# Patient Record
Sex: MALE | Race: WHITE | NOT HISPANIC OR LATINO | Employment: FULL TIME | URBAN - METROPOLITAN AREA
[De-identification: names, ages, dates, MRNs, and addresses within clinical notes are randomized per-mention and may not be internally consistent; named-entity substitution may affect disease eponyms.]

---

## 2021-02-26 ENCOUNTER — IMMUNIZATION (OUTPATIENT)
Dept: FAMILY MEDICINE | Facility: CLINIC | Age: 66
End: 2021-02-26
Payer: COMMERCIAL

## 2021-02-26 DIAGNOSIS — Z23 NEED FOR VACCINATION: Primary | ICD-10-CM

## 2021-02-26 PROCEDURE — 91300 COVID-19, MRNA, LNP-S, PF, 30 MCG/0.3 ML DOSE VACCINE: CPT | Mod: PBBFAC | Performed by: FAMILY MEDICINE

## 2021-03-19 ENCOUNTER — IMMUNIZATION (OUTPATIENT)
Dept: FAMILY MEDICINE | Facility: CLINIC | Age: 66
End: 2021-03-19
Payer: COMMERCIAL

## 2021-03-19 DIAGNOSIS — Z23 NEED FOR VACCINATION: Primary | ICD-10-CM

## 2021-03-19 PROCEDURE — 0002A COVID-19, MRNA, LNP-S, PF, 30 MCG/0.3 ML DOSE VACCINE: CPT | Mod: PBBFAC | Performed by: FAMILY MEDICINE

## 2021-03-19 PROCEDURE — 91300 COVID-19, MRNA, LNP-S, PF, 30 MCG/0.3 ML DOSE VACCINE: CPT | Mod: PBBFAC | Performed by: FAMILY MEDICINE

## 2022-01-04 ENCOUNTER — IMMUNIZATION (OUTPATIENT)
Dept: FAMILY MEDICINE | Facility: CLINIC | Age: 67
End: 2022-01-04
Payer: MEDICARE

## 2022-01-04 DIAGNOSIS — Z23 NEED FOR VACCINATION: Primary | ICD-10-CM

## 2022-01-04 PROCEDURE — 0004A COVID-19, MRNA, LNP-S, PF, 30 MCG/0.3 ML DOSE VACCINE: CPT | Mod: PBBFAC | Performed by: FAMILY MEDICINE

## 2022-07-08 ENCOUNTER — NURSE TRIAGE (OUTPATIENT)
Dept: ADMINISTRATIVE | Facility: CLINIC | Age: 67
End: 2022-07-08
Payer: MEDICARE

## 2022-07-08 NOTE — TELEPHONE ENCOUNTER
Patient's wife states c/o fever, 100.4, and diagnosis of Covid-19. Wife states patient has talked with his PCP and it was not recommended that he receive the monoclonal antibodies or Plaxovid. Wife called to inquire what temperature reading to monitor for for transport to ED.    Care Advice given per Covid-19 - Diagnosed or Suspected (Adult) Guidelines. Wife states understanding of Care Advice and cites no additional concerns at this time.    Reason for Disposition   [1] Fever AND [2] within 14 days of COVID-19 Exposure   [1] COVID-19 diagnosed by positive lab test (e.g., PCR, rapid self-test kit) AND [2] mild symptoms (e.g., cough, fever, others) AND [3] no complications or SOB    Additional Information   Negative: Shock suspected (e.g., cold/pale/clammy skin, too weak to stand, low BP, rapid pulse)   Negative: Difficult to awaken or acting confused (e.g., disoriented, slurred speech)   Negative: [1] Difficulty breathing AND [2] bluish lips, tongue or face   Negative: New-onset rash with multiple purple (or blood-colored) spots or dots   Negative: Sounds like a life-threatening emergency to the triager   Negative: Fever in a cancer patient who is currently (or recently) receiving chemotherapy or radiation therapy, or cancer patient who has metastatic or end-stage cancer and is receiving palliative care   Negative: Pregnant   Negative: Postpartum (from 0 to 6 weeks after delivery)   Negative: Fever onset within 24 hours of receiving vaccine   Negative: SEVERE difficulty breathing (e.g., struggling for each breath, speaks in single words)   Negative: Difficult to awaken or acting confused (e.g., disoriented, slurred speech)   Negative: Bluish (or gray) lips or face now   Negative: Shock suspected (e.g., cold/pale/clammy skin, too weak to stand, low BP, rapid pulse)   Negative: Sounds like a life-threatening emergency to the triager   Negative: [1] Diagnosed or suspected COVID-19 AND [2] symptoms  lasting 3 or more weeks   Negative: [1] COVID-19 exposure AND [2] NO symptoms   Negative: COVID-19 vaccine reaction suspected (e.g., fever, headache, muscle aches) occurring 1 to 3 days after getting vaccine   Negative: COVID-19 vaccine, questions about   Negative: [1] Lives with someone known to have influenza (flu test positive) AND [2] flu-like symptoms (e.g., cough, runny nose, sore throat, SOB; with or without fever)   Negative: [1] Adult with possible COVID-19 symptoms AND [2] triager concerned about severity of symptoms or other causes   Negative: COVID-19 and breastfeeding, questions about   Negative: SEVERE or constant chest pain or pressure  (Exception: Mild central chest pain, present only when coughing.)   Negative: MODERATE difficulty breathing (e.g., speaks in phrases, SOB even at rest, pulse 100-120)   Negative: [1] Headache AND [2] stiff neck (can't touch chin to chest)   Negative: Oxygen level (e.g., pulse oximetry) 90 percent or lower   Negative: Chest pain or pressure   Negative: Patient sounds very sick or weak to the triager   Negative: MILD difficulty breathing (e.g., minimal/no SOB at rest, SOB with walking, pulse <100)   Negative: Fever > 103 F (39.4 C)   Negative: [1] Fever > 101 F (38.3 C) AND [2] age > 60 years   Negative: [1] Fever > 100.0 F (37.8 C) AND [2] bedridden (e.g., nursing home patient, CVA, chronic illness, recovering from surgery)   Negative: HIGH RISK for severe COVID complications (e.g., weak immune system, age > 64 years, obesity with BMI > 25, pregnant, chronic lung disease or other chronic medical condition)  (Exception: Already seen by PCP and no new or worsening symptoms.)   Negative: [1] HIGH RISK patient AND [2] influenza is widespread in the community AND [3] ONE OR MORE respiratory symptoms: cough, sore throat, runny or stuffy nose   Negative: [1] HIGH RISK patient AND [2] influenza exposure within the last 7 days AND [3] ONE OR MORE respiratory  symptoms: cough, sore throat, runny or stuffy nose   Negative: Oxygen level (e.g., pulse oximetry) 91 to 94 percent   Negative: Fever present > 3 days (72 hours)   Negative: [1] Fever returns after gone for over 24 hours AND [2] symptoms worse or not improved   Negative: [1] Continuous (nonstop) coughing interferes with work or school AND [2] no improvement using cough treatment per Care Advice   Negative: [1] COVID-19 infection suspected by caller or triager AND [2] mild symptoms (cough, fever, or others) AND [3] negative COVID-19 rapid test   Negative: Cough present > 3 weeks   Negative: [1] COVID-19 diagnosed by positive lab test (e.g., PCR, rapid self-test kit) AND [2] NO symptoms (e.g., cough, fever, others)    Protocols used: FEVER-A-AH, CORONAVIRUS (COVID-19) DIAGNOSED OR [id number]

## 2022-10-11 ENCOUNTER — IMMUNIZATION (OUTPATIENT)
Dept: FAMILY MEDICINE | Facility: CLINIC | Age: 67
End: 2022-10-11
Payer: MEDICARE

## 2022-10-11 DIAGNOSIS — Z23 NEED FOR VACCINATION: Primary | ICD-10-CM

## 2022-10-11 PROCEDURE — 91312 COVID-19, MRNA, LNP-S, BIVALENT BOOSTER, PF, 30 MCG/0.3 ML DOSE: ICD-10-PCS | Mod: S$GLB,,, | Performed by: FAMILY MEDICINE

## 2022-10-11 PROCEDURE — 91312 COVID-19, MRNA, LNP-S, BIVALENT BOOSTER, PF, 30 MCG/0.3 ML DOSE: CPT | Mod: S$GLB,,, | Performed by: FAMILY MEDICINE

## 2022-10-11 PROCEDURE — 0124A COVID-19, MRNA, LNP-S, BIVALENT BOOSTER, PF, 30 MCG/0.3 ML DOSE: CPT | Mod: PBBFAC | Performed by: FAMILY MEDICINE

## 2025-01-08 ENCOUNTER — TELEPHONE (OUTPATIENT)
Dept: TRANSPLANT | Facility: CLINIC | Age: 70
End: 2025-01-08
Payer: MEDICARE

## 2025-01-08 DIAGNOSIS — Z00.5 TRANSPLANT DONOR EVALUATION: Primary | ICD-10-CM

## 2025-01-15 ENCOUNTER — TELEPHONE (OUTPATIENT)
Dept: TRANSPLANT | Facility: CLINIC | Age: 70
End: 2025-01-15
Payer: MEDICARE

## 2025-01-15 DIAGNOSIS — Z00.5 TRANSPLANT DONOR EVALUATION: Primary | ICD-10-CM

## 2025-01-15 NOTE — TELEPHONE ENCOUNTER
Patient notified that the results of the crossmatch with his son show that they are compatible. Patient states he would like to proceed with the medical evaluation. Required testing was discussed including infectious disease and HIV testing. Opportunity provided for questions. Informed that he will be contacted to schedule appointments. All questions were answered and patient verbalized understanding.

## 2025-01-24 ENCOUNTER — TELEPHONE (OUTPATIENT)
Dept: TRANSPLANT | Facility: CLINIC | Age: 70
End: 2025-01-24
Payer: MEDICARE

## 2025-01-24 NOTE — TELEPHONE ENCOUNTER
Spoke with donor, confirmed appointments for Wednesday 1/29/25. 24 hour urine collection reviewed.  All questions answered.

## 2025-01-29 ENCOUNTER — LAB VISIT (OUTPATIENT)
Dept: LAB | Facility: HOSPITAL | Age: 70
End: 2025-01-29
Payer: MEDICAID

## 2025-01-29 ENCOUNTER — HOSPITAL ENCOUNTER (OUTPATIENT)
Dept: RADIOLOGY | Facility: HOSPITAL | Age: 70
Discharge: HOME | End: 2025-01-29
Attending: NURSE PRACTITIONER
Payer: MEDICAID

## 2025-01-29 ENCOUNTER — OFFICE VISIT (OUTPATIENT)
Dept: TRANSPLANT | Facility: CLINIC | Age: 70
End: 2025-01-29
Payer: MEDICAID

## 2025-01-29 VITALS
DIASTOLIC BLOOD PRESSURE: 82 MMHG | SYSTOLIC BLOOD PRESSURE: 144 MMHG | HEIGHT: 64 IN | WEIGHT: 139.75 LBS | BODY MASS INDEX: 23.86 KG/M2 | OXYGEN SATURATION: 99 % | RESPIRATION RATE: 18 BRPM | HEART RATE: 58 BPM | TEMPERATURE: 97 F

## 2025-01-29 DIAGNOSIS — Z00.5 TRANSPLANT DONOR EVALUATION: ICD-10-CM

## 2025-01-29 DIAGNOSIS — G40.319 GENERALIZED CONVULSIVE EPILEPSY WITH INTRACTABLE EPILEPSY: ICD-10-CM

## 2025-01-29 DIAGNOSIS — Z00.5 WILLING TO BE KIDNEY DONOR: Primary | ICD-10-CM

## 2025-01-29 DIAGNOSIS — E78.2 MIXED HYPERLIPIDEMIA: Chronic | ICD-10-CM

## 2025-01-29 LAB
ABO + RH BLD: NORMAL
ALBUMIN SERPL BCP-MCNC: 4.7 G/DL (ref 3.5–5.2)
ALP SERPL-CCNC: 84 U/L (ref 40–150)
ALT SERPL W/O P-5'-P-CCNC: 28 U/L (ref 10–44)
ANION GAP SERPL CALC-SCNC: 11 MMOL/L (ref 8–16)
APTT PPP: 24.6 SEC (ref 21–32)
AST SERPL-CCNC: 29 U/L (ref 10–40)
BASOPHILS # BLD AUTO: 0.04 K/UL (ref 0–0.2)
BASOPHILS NFR BLD: 0.6 % (ref 0–1.9)
BILIRUB SERPL-MCNC: 0.5 MG/DL (ref 0.1–1)
BUN SERPL-MCNC: 14 MG/DL (ref 8–23)
CALCIUM SERPL-MCNC: 9.6 MG/DL (ref 8.7–10.5)
CHLORIDE SERPL-SCNC: 102 MMOL/L (ref 95–110)
CHOLEST SERPL-MCNC: 169 MG/DL (ref 120–199)
CHOLEST/HDLC SERPL: 2.4 {RATIO} (ref 2–5)
CO2 SERPL-SCNC: 29 MMOL/L (ref 23–29)
COMPLEXED PSA SERPL-MCNC: 0.45 NG/ML (ref 0–4)
CREAT SERPL-MCNC: 1.1 MG/DL (ref 0.5–1.4)
DIFFERENTIAL METHOD BLD: ABNORMAL
EOSINOPHIL # BLD AUTO: 0.4 K/UL (ref 0–0.5)
EOSINOPHIL NFR BLD: 6.3 % (ref 0–8)
ERYTHROCYTE [DISTWIDTH] IN BLOOD BY AUTOMATED COUNT: 15.5 % (ref 11.5–14.5)
EST. GFR  (NO RACE VARIABLE): >60 ML/MIN/1.73 M^2
ESTIMATED AVG GLUCOSE: 103 MG/DL (ref 68–131)
GLUCOSE SERPL-MCNC: 109 MG/DL
GLUCOSE SERPL-MCNC: 41 MG/DL
GLUCOSE SERPL-MCNC: 77 MG/DL (ref 70–110)
GLUCOSE SERPL-MCNC: 80 MG/DL (ref 70–110)
HBA1C MFR BLD: 5.2 % (ref 4–5.6)
HBV SURFACE AB SER-ACNC: <3 MIU/ML
HBV SURFACE AB SER-ACNC: NORMAL M[IU]/ML
HCT VFR BLD AUTO: 49.2 % (ref 40–54)
HDLC SERPL-MCNC: 70 MG/DL (ref 40–75)
HDLC SERPL: 41.4 % (ref 20–50)
HGB BLD-MCNC: 15.9 G/DL (ref 14–18)
IMM GRANULOCYTES # BLD AUTO: 0.02 K/UL (ref 0–0.04)
IMM GRANULOCYTES NFR BLD AUTO: 0.3 % (ref 0–0.5)
INR PPP: 1 (ref 0.8–1.2)
LDLC SERPL CALC-MCNC: 79 MG/DL (ref 63–159)
LYMPHOCYTES # BLD AUTO: 1.3 K/UL (ref 1–4.8)
LYMPHOCYTES NFR BLD: 19.4 % (ref 18–48)
MCH RBC QN AUTO: 30.3 PG (ref 27–31)
MCHC RBC AUTO-ENTMCNC: 32.3 G/DL (ref 32–36)
MCV RBC AUTO: 94 FL (ref 82–98)
MONOCYTES # BLD AUTO: 0.6 K/UL (ref 0.3–1)
MONOCYTES NFR BLD: 8.7 % (ref 4–15)
NEUTROPHILS # BLD AUTO: 4.3 K/UL (ref 1.8–7.7)
NEUTROPHILS NFR BLD: 64.7 % (ref 38–73)
NONHDLC SERPL-MCNC: 99 MG/DL
NRBC BLD-RTO: 0 /100 WBC
PHOSPHATE SERPL-MCNC: 2.2 MG/DL (ref 2.7–4.5)
PLATELET # BLD AUTO: 290 K/UL (ref 150–450)
PMV BLD AUTO: 10.6 FL (ref 9.2–12.9)
POTASSIUM SERPL-SCNC: 3.6 MMOL/L (ref 3.5–5.1)
PROT SERPL-MCNC: 8 G/DL (ref 6–8.4)
PROTHROMBIN TIME: 11.4 SEC (ref 9–12.5)
PTH-INTACT SERPL-MCNC: 76.1 PG/ML (ref 9–77)
RBC # BLD AUTO: 5.24 M/UL (ref 4.6–6.2)
SODIUM SERPL-SCNC: 142 MMOL/L (ref 136–145)
TRIGL SERPL-MCNC: 100 MG/DL (ref 30–150)
WBC # BLD AUTO: 6.56 K/UL (ref 3.9–12.7)

## 2025-01-29 PROCEDURE — 86644 CMV ANTIBODY: CPT | Mod: TXP | Performed by: NURSE PRACTITIONER

## 2025-01-29 PROCEDURE — 74174 CTA ABD&PLVS W/CONTRAST: CPT | Mod: TC,TXP

## 2025-01-29 PROCEDURE — 85610 PROTHROMBIN TIME: CPT | Mod: TXP | Performed by: NURSE PRACTITIONER

## 2025-01-29 PROCEDURE — 99214 OFFICE O/P EST MOD 30 MIN: CPT | Mod: PBBFAC,25,TXP

## 2025-01-29 PROCEDURE — 85025 COMPLETE CBC W/AUTO DIFF WBC: CPT | Mod: TXP | Performed by: NURSE PRACTITIONER

## 2025-01-29 PROCEDURE — 86665 EPSTEIN-BARR CAPSID VCA: CPT | Mod: TXP | Performed by: NURSE PRACTITIONER

## 2025-01-29 PROCEDURE — 86682 HELMINTH ANTIBODY: CPT | Mod: TXP | Performed by: NURSE PRACTITIONER

## 2025-01-29 PROCEDURE — 71046 X-RAY EXAM CHEST 2 VIEWS: CPT | Mod: TC,TXP

## 2025-01-29 PROCEDURE — 86706 HEP B SURFACE ANTIBODY: CPT | Mod: TXP | Performed by: NURSE PRACTITIONER

## 2025-01-29 PROCEDURE — 82951 GLUCOSE TOLERANCE TEST (GTT): CPT | Mod: TXP | Performed by: NURSE PRACTITIONER

## 2025-01-29 PROCEDURE — 71046 X-RAY EXAM CHEST 2 VIEWS: CPT | Mod: 26,TXP,, | Performed by: RADIOLOGY

## 2025-01-29 PROCEDURE — 99999 PR PBB SHADOW E&M-EST. PATIENT-LVL IV: CPT | Mod: PBBFAC,TXP,,

## 2025-01-29 PROCEDURE — 83970 ASSAY OF PARATHORMONE: CPT | Mod: TXP | Performed by: NURSE PRACTITIONER

## 2025-01-29 PROCEDURE — 80061 LIPID PANEL: CPT | Mod: TXP | Performed by: NURSE PRACTITIONER

## 2025-01-29 PROCEDURE — 74174 CTA ABD&PLVS W/CONTRAST: CPT | Mod: 26,TXP,, | Performed by: RADIOLOGY

## 2025-01-29 PROCEDURE — 86704 HEP B CORE ANTIBODY TOTAL: CPT | Mod: TXP | Performed by: NURSE PRACTITIONER

## 2025-01-29 PROCEDURE — 80053 COMPREHEN METABOLIC PANEL: CPT | Mod: TXP | Performed by: NURSE PRACTITIONER

## 2025-01-29 PROCEDURE — 86606 ASPERGILLUS ANTIBODY: CPT | Mod: TXP | Performed by: NURSE PRACTITIONER

## 2025-01-29 PROCEDURE — 86480 TB TEST CELL IMMUN MEASURE: CPT | Mod: TXP | Performed by: NURSE PRACTITIONER

## 2025-01-29 PROCEDURE — 83036 HEMOGLOBIN GLYCOSYLATED A1C: CPT | Mod: TXP | Performed by: NURSE PRACTITIONER

## 2025-01-29 PROCEDURE — 86703 HIV-1/HIV-2 1 RESULT ANTBDY: CPT | Mod: TXP | Performed by: NURSE PRACTITIONER

## 2025-01-29 PROCEDURE — 25500020 PHARM REV CODE 255: Mod: TXP | Performed by: NURSE PRACTITIONER

## 2025-01-29 PROCEDURE — 86901 BLOOD TYPING SEROLOGIC RH(D): CPT | Mod: TXP | Performed by: NURSE PRACTITIONER

## 2025-01-29 PROCEDURE — 86803 HEPATITIS C AB TEST: CPT | Mod: TXP | Performed by: NURSE PRACTITIONER

## 2025-01-29 PROCEDURE — 99214 OFFICE O/P EST MOD 30 MIN: CPT | Mod: S$PBB,TXP,, | Performed by: SURGERY

## 2025-01-29 PROCEDURE — 99205 OFFICE O/P NEW HI 60 MIN: CPT | Mod: S$PBB,TXP,, | Performed by: NURSE PRACTITIONER

## 2025-01-29 PROCEDURE — 85730 THROMBOPLASTIN TIME PARTIAL: CPT | Mod: TXP | Performed by: NURSE PRACTITIONER

## 2025-01-29 PROCEDURE — 82652 VIT D 1 25-DIHYDROXY: CPT | Mod: TXP | Performed by: NURSE PRACTITIONER

## 2025-01-29 PROCEDURE — 36415 COLL VENOUS BLD VENIPUNCTURE: CPT | Mod: TXP | Performed by: NURSE PRACTITIONER

## 2025-01-29 PROCEDURE — 84100 ASSAY OF PHOSPHORUS: CPT | Mod: TXP | Performed by: NURSE PRACTITIONER

## 2025-01-29 PROCEDURE — 86592 SYPHILIS TEST NON-TREP QUAL: CPT | Mod: TXP | Performed by: NURSE PRACTITIONER

## 2025-01-29 PROCEDURE — 87340 HEPATITIS B SURFACE AG IA: CPT | Mod: TXP | Performed by: NURSE PRACTITIONER

## 2025-01-29 PROCEDURE — 84153 ASSAY OF PSA TOTAL: CPT | Mod: TXP | Performed by: NURSE PRACTITIONER

## 2025-01-29 RX ORDER — PNV NO.95/FERROUS FUM/FOLIC AC 28MG-0.8MG
100 TABLET ORAL DAILY
COMMUNITY

## 2025-01-29 RX ADMIN — IOHEXOL 100 ML: 350 INJECTION, SOLUTION INTRAVENOUS at 02:01

## 2025-01-29 NOTE — PROGRESS NOTES
PHARM.D. PRE-TRANSPLANT DONOR NOTE:    This patient's medication therapy was evaluated as part of his pre-transplant evaluation.      The following general pharmacologic concerns were noted: none    The following concerns for post-operative pain management were noted: none          Current Outpatient Medications   Medication Sig Dispense Refill    cyanocobalamin (VITAMIN B-12) 100 MCG tablet Take 100 mcg by mouth once daily.      ascorbic acid (VITAMIN C) 1000 MG tablet Take 1,000 mg by mouth once daily.      lamotrigine (LAMICTAL) 200 MG tablet Take 1 tablet by mouth  daily 30 tablet 5    lorazepam (ATIVAN) 0.5 MG tablet Take 1 tablet (0.5 mg total) by mouth 2 (two) times daily. Take 1/2 tablet (0.25mg) in am, take 1 tablet (0.5mg) in pm (Patient taking differently: Take 0.5 mg by mouth 2 (two) times daily.) 5 tablet 0     No current facility-administered medications for this visit.           I am available for consultation and can be contacted, as needed by the other members of the Transplant team.

## 2025-01-29 NOTE — PROGRESS NOTES
Transplant Surgery Kidney Donor Evaluation     Referring Physician:      Subjective:     Chief Complaint: Ye Partida is a 69 y.o. year old male who presents today wishing to be evaluated as a potential living related donor for son.    History of Present Illness:  Ye reports being here without coercion, payment, guilt, or other alternative motives other than wanting to help someone with kidney disease.    External provider notes reviewed: No    Patient has had a previous pyloromyotomy as a child, required reoperation in 2007 for recurrent pyloric stenosis - gastric jejunostomy wa sperformed minimally invasively.    Review of Systems   Constitutional:  Negative for fatigue.   HENT:  Negative for drooling, postnasal drip and sore throat.    Eyes:  Negative for discharge and itching.   Respiratory:  Negative for choking and stridor.    Gastrointestinal:  Negative for rectal pain.   Endocrine: Negative for polydipsia.   Genitourinary:  Negative for enuresis and genital sores.   Musculoskeletal:  Negative for back pain, neck pain and neck stiffness.   Allergic/Immunologic: Negative for immunocompromised state.   Neurological:  Negative for facial asymmetry and numbness.   Hematological:  Negative for adenopathy.   Psychiatric/Behavioral:  Negative for behavioral problems, self-injury and suicidal ideas.    Objective:   Physical Exam  Vitals reviewed.   Constitutional:       Appearance: He is well-developed.   HENT:      Head: Normocephalic.   Eyes:      Pupils: Pupils are equal, round, and reactive to light.   Neck:      Thyroid: No thyromegaly.      Trachea: No tracheal deviation.   Cardiovascular:      Rate and Rhythm: Normal rate.      Heart sounds: No murmur heard.  Pulmonary:      Effort: No respiratory distress.      Breath sounds: No wheezing or rales.   Abdominal:      General: There is no distension.      Palpations: There is no mass.      Tenderness: There is no abdominal tenderness. There is no guarding or  rebound.          Comments: Right upper quadrant transverse incision, laparoscopic scars   Lymphadenopathy:      Cervical: No cervical adenopathy.   Skin:     General: Skin is warm and dry.      Findings: No erythema or rash.   Neurological:      Mental Status: He is alert and oriented to person, place, and time.      Cranial Nerves: No cranial nerve deficit.   Psychiatric:         Behavior: Behavior normal.         Thought Content: Thought content normal.         Judgment: Judgment normal.   ABO type: A POS    Diagnostics:  The following labs have been reviewed: CBC  CMP    Diagnoses:  1. Willing to be kidney donor    2. Generalized convulsive epilepsy with intractable epilepsy    3. Mixed hyperlipidemia             Transplant Surgery - Candidacy   Assessment/Plan:     Donor Candidacy: Based on information available thus far, Ye is a suitable candidate for living kidney donation.    Additional testing to be completed according to Written Order Guideline for Living Kidney Donor (LD) Evaluation (LDK-02).    Patient advised that it is recommended that all transplant candidates, and their close contacts and household members receive Covid vaccination.    Interpretation of tests and discussion of patient management involves all members of the multidisciplinary transplant team.  Sujit Delgadillo MD       Education: I discussed with the patient the requirements for donation including the compatibility of blood and tissue typing, healthy by physical examination and workup, as well as the desire to donate.  We discussed the risks related to surgery including the risks related to anesthesia, bleeding, infection, inability for surgery to be performed laparoscopically, risks of reoperation as well as the risks of death.  We discussed the length of hospitalization, return to work times, as well as follow-up post-donation.    I also discussed the slight possibility that due to problems with the recipient operation, the  transplant might not be able to be completed after the organ was already removed. If such a situation should arise, the donor prefers that the organ be transplanted into a suitable third-party recipient.    I discussed the possibility that living donor sometimes encounter problems obtaining health insurance or could have higher premium despite ongoing efforts of transplant professionals to educate insurance companies on this issue.    I discussed with Ye that donation is a voluntary activity and reiterated it should be done willingly and for altruistic reasons only.  I reviewed that no payment should be made for donating.  I also discussed that coercion, guilt, pressure, or feelings of obligation are not appropriate reasons to donate.  The option to withdraw at any time was emphasized.  Ye was reminded that a medical opt out can be given to protect his confidentiality, and no member of the transplant team will discuss specifics of his health or medical/social history with anyone else without permission.  The need for lifelong routine medical follow-up for optimal health, including routine health maintenance was reviewed.    Additionally, I discussed the need for our program to be able to contact living donors for UNOS reporting purposes for a minimum of 2 years.  Failure of our center to be able to provide such information could jeopardize our ability to continue to offer living donor transplants.  Ye voices understanding and agrees to this follow-up.    I discussed the UNOS requirement for centers to report donor status for a minimum of two years. Ye understands that failure to comply with requirement could have adverse consequences for our transplant program and agrees to cooperate with all our required follow-up.    I reviewed with Ye available lab results and other diagnostics from the evaluation process.    Coronavirus disease (COVID-19) caused by severe acute respiratory virus coronavirus 2  (SARS-C0V 2) is associated with increased mortality in solid organ transplant recipients (SOT) compared to non-transplant patients. Vaccine responses to vaccination are depressed against SARS-CoV2 compared to normal individuals but improve with third vaccination doses. Vaccination prior to SOT provides both the best opportunity for transplant candidates to develop protective immunity and to reduce the risk of serious COVID19 infections post transplantation. Organ transplant candidates at [facility] will be required to receive SARS-CoV-2 vaccination prior to being listed with a an active status, whenever possible. Exceptions will be made for disability related reasons or for sincerely held Holiness beliefs.

## 2025-01-29 NOTE — PROGRESS NOTES
TRANSPLANT DONOR PSYCHOSOCIAL ASSESSMENT    [address]  [place] [address]  Telephone Information:   Mobile [phone]     Home [phone] (home)  Work  There is no work phone number on file.  E-mail  [email or web address]    PHS Increased Risk Behavioral Questionnaire reviewed by : Yes   addressed any PHS increased risk behaviors or concerns. Resources and education provided as appropriate.    Sex: male  YOB: 1955  Age: 69 y.o.    Encounter Date: 2025  [place] Citizen: yes  Primary Language: English   Needed: no    Potential Recipient: Phillip Rodriguesuton  Clinic Number: [id number]  Donor's Relationship to Patient: son    Emergency Contact:  Name: Meenu Partida (Debbie)  Relationship: wife  Address: [address] , [place] [address]  Phone Numbers: [phone] (mobile)    Family/Social Support:   Number of dependents/: 0  Marital history:  43 years   Other family dynamics: Parents , wife, 1 son, sister and brother    Household Composition:  Name: Meenu Partida (Debbie)  Relationship: wife  Does person drive? yes    Do you and your caregivers have access to reliable transportation? yes  PRIMARY CAREGIVER: Meenu Partida (Debbie) will be primary caregiver, phone number  [phone].      provided in-depth information to patient and caregiver regarding pre- and post-transplant caregiver role.   strongly encourages patient and caregiver to have concrete plan regarding post-transplant care giving, including back-up caregiver(s) to ensure care giving needs are met as needed.    Patient and Caregiver states understanding all aspects of caregiver role/commitment and is able/willing/committed to being caregiver to the fullest extent necessary.    Patient and Caregiver verbalizes understanding of the education provided today and caregiver responsibilities.         remains available.  Patient and Caregiver agree to contact  in a timely manner if concerns arise.      Able to take time off work without financial concerns: yes.     Additional Significant Others who will Assist with Transplant:  Name: Radames Partida, [phone]  City: [place] State: [place]  Relationship: brother  Does person drive? yes    Name: Claire Partida, [phone]  City: [place] State: [place]  Relationship:  Sister-in-law  Does person drive? yes    Living Will: yes  Healthcare Power of : no  Advance Directives on file: Verbally reviewed LW/HCPA information.   provided patient with copy of LW/HCPA documents and provided education on completion of forms.    Education: college degree  Reading Ability: college  Reports difficulty with: N/A  Learns [employer]:  multi-sensor instruction      Status: no  VA Benefits: no     Employment:  Patient retired in April 2024  Fundraising and NLDAC information provided to patient.  Patient and Caregiver verbalizes understanding.   remains available.    Spouse/Significant Other Employment: Wife is retired.     Insurance: see potential recipient's insurance for donor coverage.    Does the donor have health insurance? Yes    Patient and Caregiver verbalizes clear understanding that patient may experience difficulty obtaining and/or be denied insurance coverages post-surgery.  This includes and is not limited to disability insurance, life insurance, health insurance, burial insurance, long term care insurance, and other insurances.  Patient also reports understanding that future health concerns related to or unrelated to transplantation may not be covered by patient's insurance.  Resources and information provided and reviewed.     Patient and Caregiver provides verbal permission to release any necessary information to outside resources for patient care and discharge planning.  Resources and information provided and reviewed.      Infusion  Service: patient utilizing? no  Home Health: patient utilizing? no  DME: no  ADLS:  Patient can ambulate, complete ADL's, drive and manage medications without assistance.    Adherence:  Adherence education and counseling provided    Per History Section:  Past Medical History:   Diagnosis Date    Hyperlipidemia     Pyloric stenosis, congenital     s/p repair    Seizures      Social History     Tobacco Use    Smoking status: Former     Current packs/day: 0.00     Types: Cigarettes     Quit date:      Years since quittin.0    Smokeless tobacco: Never   Substance Use Topics    Alcohol use: No     Social History     Substance and Sexual Activity   Drug Use No     Social History     Substance and Sexual Activity   Sexual Activity Yes    Partners: Female       Per Today's Psychosocial:  Tobacco: Former smoker, Patient quit smoking in . Patient smoked for 22 years.  Patient was a closet smoker and smoked 1 pack of cigarettes a week.   Alcohol: none, patient denies any use.  Illicit Drugs/Non-prescribed Medications: none, patient denies any use.    Patient and Caregiver states clear understanding of the potential impact of substance use as it relates to donor candidacy and is agreeable to random substance screening.  Substance abstinence/cessation counseling, education and resources provided and reviewed.     Arrests/DWI/Treatment/Rehab: patient denies    Psychiatric History:    Mental Health: Patient denies  Psychiatrist/Counselor: Patient denies  Medications:  Patient denies  Suicide/Homicide Issues: Patient denies   Safety at home: Patient reported he feels safe at home.     Donation Knowledge/Expectations: Patient and Caregiver states having clear understanding and realistic expectations regarding the potential risks and potential benefits of organ transplantation and organ donation and agrees to discuss with health care team members and support system members, as well as to utilize available resources and  express questions and/or concerns in order to further facilitate the patients informed decision-making.  Resources and information provided and reviewed.     Decision-making Process: Patient and Caregiver states understanding that transplant and donation are not a guarantee that the donated organ will function. Patient and Caregiver states understanding of kidney treatment options available for kidney patients, psychosocial aspects surrounding organ donation and transplantation as well as recovery.  Patient and Caregiver also states clear understanding that patient may choose to not donate at any time prior to surgery taking place, and that patient confidentiality is protected.  Patient and Caregiver reports expected compliance with health care regime and states understanding of importance of compliance.  Educational information provided.    Patient and Caregiver reports having a clear understanding  that risks and benefits may be involved with organ transplantation and with organ donation and  of the treatment options available to a potential transplant recipient. Patient and Caregiver has an understanding there are short and long-term medical and psychosocial risks of living donation for both the donor and recipient. Patient and Caregiver reports clear understanding that psychosocial risk factors which may affect patient, including but not limited to feelings of depression, generalized anxiety, anxiety regarding dependence on others, post traumatic stress disorder, feelings of guilt and other emotional and/or mental concerns, and/or exacerbation of existing mental health concerns.     Detailed resources and education provided and discussed. Patient and Caregiver agrees to access appropriate resources in a timely manner as needed and to communicate concerns appropriately.      Feelings or Concerns: Patient and Caregiver denies feelings of coercion, pressure or obligation to donate. Patient and Caregiver states  that patient is not receiving any compensation for organ donation. Patient and Caregiver reports motivation to pursue organ donation at this time.     Patient and Caregiver reports having clear and realistic expectations and understanding of the many psychosocial aspects involved with being a living organ donor, including but not limited to costs, compliance, medications, lab work, procedures, appointments, financial planning, preparedness, timely and appropriate communication of concerns, abstinence from non-prescribed drugs or substances, importance of adherence to and follow-through with all health care team recommendations, participation in health care and treatment planning, utilization of resources and follow-through, mental health counseling as needed and/or recommended, and the patient and caregiver responsibilities.  Patient and Caregiver states having a clear understanding of possible difficulty obtaining or possibility of being denied insurance coverage post-surgery.  This includes and is not limited to disability insurance, life insurance, health insurance, burial insurance, long-term care insurance and other insurances.  Educational and resource information provided and reviewed.  Patient and Caregiver also reports understanding that future health concerns related to or unrelated to organ donation may not be covered by patients insurance.    Coping: Identify Patient & Caregiver Strategies to [place]:   1. In the past, coping with major surgery and/or related stress - Workout or do hard work   2. Currently & Pre-transplant - Family and exercise   3. At the time of organ donation surgery - Family   4. During post-Organ donation & Recovery Period - Family    Interview Behavior: Ye presents as alert and oriented x 4, pleasant, good eye contact, well groomed, recall good, concentration/judgement good, average intelligence, calm, communicative, cooperative, and asking and answering questions appropriately.       Suitability for Donation: Ye Partida presents as a good candidate for donation at this time.    Recommendations/Additional Comments:  recommends patient remain abstinent from use of illicit substances and remain cognizant of right to reconsider donation. SW recommends that patient remain aware of potential mental health concerns and contact the team if any concerns arise. Patient denied needing or wanting mental health resources or referrals at this time. Patient agreed to contact  team as needed. SW recommends that patient remain abstinent from tobacco, ETOH, and drug use. SW supports patient continued adherence. SW remains available to answer any questions or concerns that arise as the patient moves through the transplant process.    Aisha Griffiths LCSW, Kidney Transplant   [facility]  [address] [place] [address]  Phone #: [phone]  Extension # [phone]  Fax # [phone]  [email or web address]

## 2025-01-29 NOTE — PROGRESS NOTES
"DONOR TEACHING NOTE    Met with Ye Partida today in clinic to review living donor education.        Topics covered included:  Kidney donation is done voluntarily and of the donor's free will.  Process can stop at any time.   Better success rates than cadaveric donation, shorter waiting time for the recipient: less than the 3-5 year wait on the list, more time to prepare: tests/surgery can be planned  Laboratory studies of blood and urine.  Health exams: records of GYN/pap/mammogram (females); evaluation by transplant team and a psychiatrist (if indicated); diagnostic Tests: CXR, EKG, TB skin test, 24-hour urine collection, renal scan, CT of abdomen to assess kidney anatomy, and stress test (if indicated)  Team will review workup for approval.  Copy of the approved criteria for donation given to patient.  Surgeon will decide which kidney will be donated.   Benefits of laparoscopic nephrectomy: less pain, shorter hospital stay, a shorter recovery period.  Risks discussed: bleeding, deep vein thrombosis, pulmonary embolus, the need for re-operation.  Your operation may be converted to an "open" procedure if the surgeon feels it is medically necessary.  To recovery room, transfer to TSU, if space allows or semi-private room.  Snell catheter/IV, average hospital stay is 1 day.  At discharge the cost of any prescriptions is the donor's responsibility.  Post-operative visit 4 weeks after surgery or prior to returning to work, unless a complication arises and you need to be seen sooner.  Off of work for about 3 to 6 weeks, no driving for at least the first 3 weeks.  General surgical complications: infection, allergic reaction, and anesthesia.   Long life considerations of living with one kidney: risk of HTN and kidney failure   Following up with PCP 6 months after donation then yearly thereafter to monitor kidney function.  This should include weight, labs, urinalysis, and a blood pressure check.  We are required to " report your progress to [facility] at 6 months, 1 year, and 2 years post-donation.     Written educational material provided. Informed consent reviewed and signed. All questions were answered and patient verbalized understanding.     Patient is a suitable candidate for living kidney donation.

## 2025-01-29 NOTE — PROGRESS NOTES
Kidney Transplant Donor Evaluation    Subjective:       CC:  Initial evaluation of kidney donor candidacy.    HPI:  Mr. Partida is a 69 y.o. year old White male who has presented to be evaluated as a potential living related donor for his son.  Mr. Partida reports being here without coercion, payment, guilt or other alternative motives other than wanting to help someone with kidney disease.    Patient denies any history of coronary artery disease, stroke, seizure disorder, chronic obstructive pulmonary disease, liver disease, kidney stones, gallstones, deep venous thrombosis, pulmonary embolism, recurrent urinary tract infections or malignancies.    Continues to work. Is active, looks great, not frail.     Epilepsy-Seizure Type 1  Classification: Convulsions  Aura - Myoclonic jerks  Dx ~ age 18  Last seizure: > 20 years ago  Meds: Lamictal and Ativan  Neurologist:Dr Cheatham at [facility] [place]     Family HX DM (Son + DM1)  Lab Results   Component Value Date    HGBA1C 5.2 01/29/2025       HX Pyloric stenosis at 2 weeks old and at age 51  S/p pyloric bypass surgery   BP Readings from Last 3 Encounters:   01/29/25 (!) 144/82   04/23/14 134/90   04/15/14 140/85       Hx Vit D Def:   3/28/24 Vit D 28  Lab Results   Component Value Date    CALCIUM 9.6 01/29/2025    PHOS 2.2 (L) 01/29/2025       Past Medical History:   Diagnosis Date    Hyperlipidemia     Pyloric stenosis, congenital     s/p repair    Seizures      Discussed the need to follow up closely with PCP post donation; including an annual physical exam with labs to monitor renal fx  and general health.     Discussed avoidance of NSAIDs post nephrectomy.   Do not take non-steroidal anti-inflammatory medications (NSAIDS) such as Ibuprofen (Advil, Motrin, etc), Naproxen (Aleve, etc), Celecoxib (Celebrex) or Ketoprofen. These common arthritis medications can cause permanent kidney damage or worsen your kidney damage. For mild occasional pain, Acetaminophen (Tylenol, etc) is safe  for your kidneys.    All questions answered     Family History   Problem Relation Name Age of Onset    Cancer Mother          breast ca    Arthritis Mother      Hypertension Mother      Cancer Father          prostate ca    Alcohol abuse Sister      Hypertension Sister      Obesity Sister      Heart disease Sister      Alcohol abuse Brother      Hypertension Brother      Obesity Brother      Kidney disease Son      Diabetes Son      Crohn's disease Son        Past Surgical History:   Procedure Laterality Date    pyloric stenosis      TONSILLECTOMY        Social History     Tobacco Use    Smoking status: Former     Current packs/day: 0.00     Types: Cigarettes     Quit date:      Years since quittin.0    Smokeless tobacco: Never   Substance Use Topics    Alcohol use: No    Drug use: No      Current Outpatient Medications   Medication Sig Dispense Refill    ascorbic acid (VITAMIN C) 1000 MG tablet Take 1,000 mg by mouth once daily.      cyanocobalamin (VITAMIN B-12) 100 MCG tablet Take 100 mcg by mouth once daily.      lamotrigine (LAMICTAL) 200 MG tablet Take 1 tablet by mouth  daily 30 tablet 5    lorazepam (ATIVAN) 0.5 MG tablet Take 1 tablet (0.5 mg total) by mouth 2 (two) times daily. Take 1/2 tablet (0.25mg) in am, take 1 tablet (0.5mg) in pm (Patient taking differently: Take 0.5 mg by mouth 2 (two) times daily.) 5 tablet 0     No current facility-administered medications for this visit.       Review of Systems   Constitutional:  Negative for activity change, appetite change, chills, fatigue, fever and unexpected weight change.   HENT:  Negative for congestion, facial swelling, postnasal drip, rhinorrhea, sinus pressure, sore throat and trouble swallowing.    Eyes:  Negative for pain, redness and visual disturbance.   Respiratory:  Negative for cough, chest tightness, shortness of breath and wheezing.    Cardiovascular: Negative.  Negative for chest pain, palpitations and leg swelling.  "  Gastrointestinal:  Negative for abdominal pain, diarrhea, nausea and vomiting.   Genitourinary:  Negative for dysuria, flank pain and urgency.   Musculoskeletal:  Negative for gait problem, neck pain and neck stiffness.   Skin:  Negative for rash.   Allergic/Immunologic: Negative for environmental allergies, food allergies and immunocompromised state.   Neurological:  Positive for seizures (HX Epilepsy). Negative for dizziness, weakness, light-headedness and headaches.   Psychiatric/Behavioral:  Negative for agitation and confusion. The patient is not nervous/anxious.        Objective:BP (!) 144/82 (BP Location: Left arm, Patient Position: Sitting)   Pulse (!) 58   Temp 97.2 °F (36.2 °C) (Tympanic)   Resp 18   Ht 5' 4.49" (1.638 m)   Wt 63.4 kg (139 lb 12.4 oz)   SpO2 99%   BMI 23.63 kg/m²      Physical Exam  Constitutional:       Appearance: Normal appearance. He is well-developed.   HENT:      Head: Normocephalic.      Nose: Nose normal.   Eyes:      Conjunctiva/sclera: Conjunctivae normal.      Pupils: Pupils are equal, round, and reactive to light.   Cardiovascular:      Rate and Rhythm: Normal rate and regular rhythm.      Heart sounds: Normal heart sounds.   Pulmonary:      Effort: Pulmonary effort is normal.      Breath sounds: Normal breath sounds.   Abdominal:      General: Bowel sounds are normal.      Palpations: Abdomen is soft.          Comments: Healed scar   Musculoskeletal:      Cervical back: Normal range of motion and neck supple.   Skin:     General: Skin is warm and dry.   Neurological:      Mental Status: He is alert and oriented to person, place, and time.   Psychiatric:         Behavior: Behavior normal.         Labs:  1/29/2025: Creatinine 1.1 mg/dL (Ref range: 0.5 - 1.4 mg/dL); BUN 14 mg/dL (Ref range: 8 - 23 mg/dL)     ABO type: A POS  Lab Results   Component Value Date    CALCIUM 9.6 01/29/2025    PHOS 2.2 (L) 01/29/2025       Lab Results   Component Value Date    CREATININE 1.1 " 01/29/2025    BUN 14 01/29/2025     01/29/2025    K 3.6 01/29/2025     01/29/2025    CO2 29 01/29/2025     Lab Results   Component Value Date    ALT 28 01/29/2025    AST 29 01/29/2025    ALKPHOS 84 01/29/2025    BILITOT 0.5 01/29/2025     Lab Results   Component Value Date    WBC 6.56 01/29/2025    HGB 15.9 01/29/2025    HCT 49.2 01/29/2025    MCV 94 01/29/2025     01/29/2025       Lab Results   Component Value Date    CHOL 169 01/29/2025     Lab Results   Component Value Date    HDL 70 01/29/2025     Lab Results   Component Value Date    LDLCALC 79.0 01/29/2025     Lab Results   Component Value Date    TRIG 100 01/29/2025       Lab Results   Component Value Date    CHOLHDL 41.4 01/29/2025     PSA, Screen (ng/mL)   Date Value   01/29/2025 0.45       Assessment:     1. Willing to be kidney donor    2. Generalized convulsive epilepsy with intractable epilepsy    3. Mixed hyperlipidemia        Plan:   Elevated BP reading: Need 24 BP monitor   HX Epilepsy: neurology surgical  CLEARANCE  AND  any concern with donation  Last SZ > 20 years  -colonoscopy per guidelines     -Low Phos--add Vit D and  PTH and send result to PCP   Hx Vit D Def: 3/28/24 Vit D level 28      Donor Candidacy:   Based on the given information, Mr. Partida appears to be a suitable candidate for kidney donation.  A final recommendation will be made by the selection committee after reviewing his complete workup.    Taylor John NP       Counseling:   I discussed with Mr. Partida that donation is voluntary and reiterated it should be done willingly and for altruistic reasons only.  I reviewed that no payment should be received for donating.  I also discussed that coercion, guilt, pressure, or feelings of obligation are not appropriate reasons to donate.  The option to withdraw at any time was emphasized.  Mr. Partida was reminded that a medical opt out can be given to protect his confidentiality, and no member of the transplant team  will discuss specifics of his health or medical/social history with anyone else without permission.  The need for lifelong routine medical follow-up for optimal health, including routine health maintenance was reviewed.    We also discussed the long term risks associated with kidney donation.  I told the patient that his GFR should return to within 75-80% of pre-donation level within six months of donation.  I informed the patient that there is a small risk of developing albuminuria and hypertension following donor nephrectomy.  I also informed the patient that based on current literature, the risk of developing end-stage renal disease following donor nephrectomy is similar to the general population.    Patient advised that it is recommended that all transplant candidates, and their close contacts and household members receive Covid vaccination.    I reviewed with Mr. Partida available lab results and other diagnostics from the evaluation process    Additional Counseling:   The patient was counseled on the need for regular follow-up with a primary care physician for blood pressure and cholesterol screening.  The importance of age appropriate health screening was also emphasized.    Follow-up:   prn    Altogether, 30 minutes of this encounter were spent on counseling, which was greater than 50% of the total visit time.

## 2025-01-30 LAB — EBV VCA IGG SER QL IA: POSITIVE

## 2025-01-31 ENCOUNTER — HOSPITAL ENCOUNTER (OUTPATIENT)
Dept: CARDIOLOGY | Facility: HOSPITAL | Age: 70
Discharge: HOME | End: 2025-01-31
Attending: NURSE PRACTITIONER
Payer: MEDICAID

## 2025-01-31 VITALS
WEIGHT: 146 LBS | DIASTOLIC BLOOD PRESSURE: 92 MMHG | SYSTOLIC BLOOD PRESSURE: 162 MMHG | HEIGHT: 68 IN | HEART RATE: 70 BPM | BODY MASS INDEX: 22.13 KG/M2

## 2025-01-31 DIAGNOSIS — Z00.5 TRANSPLANT DONOR EVALUATION: ICD-10-CM

## 2025-01-31 LAB
1,25(OH)2D3 SERPL-MCNC: 85 PG/ML (ref 20–79)
ASCENDING AORTA: 3.2 CM
AV INDEX (PROSTH): 0.8
AV LVOT MEAN GRADIENT: 2 MMHG
AV LVOT PEAK GRADIENT: 4 MMHG
AV MEAN GRADIENT: 3 MMHG
AV PEAK GRADIENT: 6 MMHG
AV VELOCITY RATIO: 0.92
BSA FOR ECHO PROCEDURE: 1.78 M2
CMV AB TOTAL, DONOR EVAL (BLOOD CENTER): REACTIVE
CV ECHO LV RWT: 0.36 CM
CV STRESS BASE HR: 162 BPM
DOP CALC AO PEAK VEL: 1.2 M/S
DOP CALC AO VTI: 30.8 CM
DOP CALC LVOT PEAK VEL: 1.1 M/S
DOP CALCLVOT PEAK VEL VTI: 24.6 CM
E WAVE DECELERATION TIME: 188 MS
E/A RATIO: 0.76
E/E' RATIO: -10 M/S
ECHO EF ESTIMATED: 50 %
ECHO LV POSTERIOR WALL: 0.9 CM (ref 0.6–1.1)
EJECTION FRACTION: 50 %
FRACTIONAL SHORTENING: 26 % (ref 28–44)
GAMMA INTERFERON BACKGROUND BLD IA-ACNC: 0.1 IU/ML
HEPATITIS B CORE  AB, DONOR EVAL, (BLOOD CENTER): NORMAL
HEPATITIS B SURFACE AG, DONOR EVAL, (BLOOD CENTER): NORMAL
HEPATITIS C ANTIBODY,DONOR EVAL (BLOOD CENTER): NORMAL
HIV1/2 AG/AB, DONOR EVAL (BLOOD CENTER): NORMAL
INTERVENTRICULAR SEPTUM: 0.9 CM (ref 0.6–1.1)
LA MAJOR: 5 CM
LA WIDTH: 3.7 CM
LEFT ATRIUM SIZE: 2.9 CM
LEFT INTERNAL DIMENSION IN SYSTOLE: 3.7 CM (ref 2.1–4)
LEFT VENTRICLE DIASTOLIC VOLUME INDEX: 65.49 ML/M2
LEFT VENTRICLE DIASTOLIC VOLUME: 117.23 ML
LEFT VENTRICLE MASS INDEX: 88.4 G/M2
LEFT VENTRICLE SYSTOLIC VOLUME INDEX: 33 ML/M2
LEFT VENTRICLE SYSTOLIC VOLUME: 58.99 ML
LEFT VENTRICULAR INTERNAL DIMENSION IN DIASTOLE: 5 CM (ref 3.5–6)
LEFT VENTRICULAR MASS: 158.2 G
LV LATERAL E/E' RATIO: -8.6 M/S
LV SEPTAL E/E' RATIO: -12 M/S
M TB IFN-G CD4+ BCKGRND COR BLD-ACNC: -0.01 IU/ML
M TB IFN-G CD4+ BCKGRND COR BLD-ACNC: -0.03 IU/ML
MITOGEN IGNF BCKGRD COR BLD-ACNC: 9.9 IU/ML
MV A" WAVE DURATION": 128.45 MS
MV PEAK A VEL: 0.79 M/S
MV PEAK E VEL: 0.6 M/S
OHS CV CPX 1 MINUTE RECOVERY HEART RATE: 126 BPM
OHS CV CPX 85 PERCENT MAX PREDICTED HEART RATE MALE: 128
OHS CV CPX ESTIMATED METS: 12
OHS CV CPX MAX PREDICTED HEART RATE: 151
OHS CV CPX PATIENT IS FEMALE: 0
OHS CV CPX PATIENT IS MALE: 1
OHS CV CPX PEAK DIASTOLIC BLOOD PRESSURE: 83 MMHG
OHS CV CPX PEAK HEAR RATE: 136 BPM
OHS CV CPX PEAK RATE PRESSURE PRODUCT: ABNORMAL
OHS CV CPX PEAK SYSTOLIC BLOOD PRESSURE: 137 MMHG
OHS CV CPX PERCENT MAX PREDICTED HEART RATE ACHIEVED: 90
OHS CV CPX RATE PRESSURE PRODUCT PRESENTING: ABNORMAL
OHS CV RV/LV RATIO: 0.52 CM
POST STRESS EJECTION FRACTION: 43 %
PULM VEIN A" WAVE DURATION": 128.45 MS
PULM VEIN S/D RATIO: 2.06
PULMONIC VEIN PEAK A VELOCITY: 0.3 M/S
PV PEAK D VEL: 0.31 M/S
PV PEAK S VEL: 0.64 M/S
RA MAJOR: 4.91 CM
RA PRESSURE ESTIMATED: 3 MMHG
RA WIDTH: 3.5 CM
RIGHT VENTRICLE DIASTOLIC BASEL DIMENSION: 2.6 CM
RPR, DONOR EVAL (BLOOD CENTER): NORMAL
SINUS: 3.59 CM
STJ: 3.17 CM
STRESS ECHO POST EXERCISE DUR MIN: 7 MINUTES
STRESS ECHO POST EXERCISE DUR SEC: 1 SECONDS
STRESS ST DEPRESSION: 1.5 MM
STRONGYLOIDES ANTIBODY IGG: NEGATIVE
SYSTOLIC BLOOD PRESSURE: 92 MMHG
TB GOLD PLUS: NEGATIVE
TDI LATERAL: -0.07 M/S
TDI SEPTAL: -0.05 M/S
TDI: -0.06 M/S
TRICUSPID ANNULAR PLANE SYSTOLIC EXCURSION: 2.18 CM
Z-SCORE OF LEFT VENTRICULAR DIMENSION IN END DIASTOLE: 0.1
Z-SCORE OF LEFT VENTRICULAR DIMENSION IN END SYSTOLE: 1.51

## 2025-01-31 PROCEDURE — 93325 DOPPLER ECHO COLOR FLOW MAPG: CPT | Mod: 26,TXP,, | Performed by: INTERNAL MEDICINE

## 2025-01-31 PROCEDURE — 93320 DOPPLER ECHO COMPLETE: CPT | Mod: 26,TXP,, | Performed by: INTERNAL MEDICINE

## 2025-01-31 PROCEDURE — 93351 STRESS TTE COMPLETE: CPT | Mod: 26,TXP,, | Performed by: INTERNAL MEDICINE

## 2025-01-31 PROCEDURE — 93320 DOPPLER ECHO COMPLETE: CPT | Mod: PO,TXP

## 2025-02-03 ENCOUNTER — TELEPHONE (OUTPATIENT)
Dept: TRANSPLANT | Facility: CLINIC | Age: 70
End: 2025-02-03
Payer: MEDICARE

## 2025-02-03 NOTE — TELEPHONE ENCOUNTER
Spoke with patient regarding results of exercise stress test.  Let him know I would be bringing the results to committee and he would need to see a cardiologist, outside of the donor workup.  Let him know we would touch base with him after our meeting on Friday.  All questions answered.

## 2025-02-04 LAB
ASPERGILLUS AB SER QL ID: NOT DETECTED
B DERMAT AB SER QL ID: NOT DETECTED
C IMMITIS AB SER QL ID: NOT DETECTED
H CAPSUL AB SER QL ID: NOT DETECTED

## 2025-02-07 ENCOUNTER — COMMITTEE REVIEW (OUTPATIENT)
Dept: TRANSPLANT | Facility: CLINIC | Age: 70
End: 2025-02-07
Payer: MEDICARE

## 2025-02-07 NOTE — COMMITTEE REVIEW
Ye Partida was discussed at selection committee on 2/7/2025. Patient did not meet selection criteria for living kidney donation due to abnormal exercise stress test.    Spoke with patient, let him know the committee's decision.  Let him know it is very important for him to get in with a cardiologist as soon as possible.  Also let him know that he needs to be on the lookout for cardiac symptoms and go the ER if he experiences any.  Records sent to patient via email.  All questions answered.    Note written by Ivory Neely RN.    ================================================================  I was present at the meeting and attest to the general consensus of the committee.   Cristobal Weaver Jr.

## 2025-02-19 ENCOUNTER — HOSPITAL ENCOUNTER (INPATIENT)
Facility: HOSPITAL | Age: 70
LOS: 5 days | Discharge: HOME | DRG: 234 | End: 2025-02-25
Attending: EMERGENCY MEDICINE | Admitting: STUDENT IN AN ORGANIZED HEALTH CARE EDUCATION/TRAINING PROGRAM
Payer: MEDICARE

## 2025-02-19 DIAGNOSIS — R73.9 TRANSIENT HYPERGLYCEMIA POST PROCEDURE: Primary | ICD-10-CM

## 2025-02-19 DIAGNOSIS — Z95.1 S/P CABG X 3: ICD-10-CM

## 2025-02-19 DIAGNOSIS — I25.10 CAD (CORONARY ARTERY DISEASE): ICD-10-CM

## 2025-02-19 DIAGNOSIS — R07.89 CHEST DISCOMFORT: ICD-10-CM

## 2025-02-19 DIAGNOSIS — I25.10 CORONARY ARTERY DISEASE: ICD-10-CM

## 2025-02-19 DIAGNOSIS — I10 PRIMARY HYPERTENSION: ICD-10-CM

## 2025-02-19 DIAGNOSIS — R07.9 CHEST PAIN: ICD-10-CM

## 2025-02-19 DIAGNOSIS — I25.10 CORONARY ARTERY DISEASE, UNSPECIFIED VESSEL OR LESION TYPE, UNSPECIFIED WHETHER ANGINA PRESENT, UNSPECIFIED WHETHER NATIVE OR TRANSPLANTED HEART: Primary | ICD-10-CM

## 2025-02-19 DIAGNOSIS — I49.9 ARRHYTHMIA: ICD-10-CM

## 2025-02-19 DIAGNOSIS — Z95.1 S/P CABG (CORONARY ARTERY BYPASS GRAFT): ICD-10-CM

## 2025-02-19 LAB
ALBUMIN SERPL BCP-MCNC: 3.7 G/DL (ref 3.5–5.2)
ALP SERPL-CCNC: 70 U/L (ref 40–150)
ALT SERPL W/O P-5'-P-CCNC: 23 U/L (ref 10–44)
ANION GAP SERPL CALC-SCNC: 9 MMOL/L (ref 8–16)
AST SERPL-CCNC: 55 U/L (ref 10–40)
BASOPHILS # BLD AUTO: 0.04 K/UL (ref 0–0.2)
BASOPHILS NFR BLD: 0.7 % (ref 0–1.9)
BILIRUB SERPL-MCNC: 0.3 MG/DL (ref 0.1–1)
BNP SERPL-MCNC: 78 PG/ML (ref 0–99)
BUN SERPL-MCNC: 14 MG/DL (ref 8–23)
CALCIUM SERPL-MCNC: 8.8 MG/DL (ref 8.7–10.5)
CHLORIDE SERPL-SCNC: 104 MMOL/L (ref 95–110)
CO2 SERPL-SCNC: 28 MMOL/L (ref 23–29)
CREAT SERPL-MCNC: 1.2 MG/DL (ref 0.5–1.4)
DIFFERENTIAL METHOD BLD: ABNORMAL
EOSINOPHIL # BLD AUTO: 0.3 K/UL (ref 0–0.5)
EOSINOPHIL NFR BLD: 4.5 % (ref 0–8)
ERYTHROCYTE [DISTWIDTH] IN BLOOD BY AUTOMATED COUNT: 15.1 % (ref 11.5–14.5)
EST. GFR  (NO RACE VARIABLE): >60 ML/MIN/1.73 M^2
GLUCOSE SERPL-MCNC: 93 MG/DL (ref 70–110)
HCT VFR BLD AUTO: 43.4 % (ref 40–54)
HCV AB SERPL QL IA: NORMAL
HGB BLD-MCNC: 14.4 G/DL (ref 14–18)
HIV 1+2 AB+HIV1 P24 AG SERPL QL IA: NORMAL
IMM GRANULOCYTES # BLD AUTO: 0.02 K/UL (ref 0–0.04)
IMM GRANULOCYTES NFR BLD AUTO: 0.4 % (ref 0–0.5)
LYMPHOCYTES # BLD AUTO: 1.3 K/UL (ref 1–4.8)
LYMPHOCYTES NFR BLD: 22.6 % (ref 18–48)
MCH RBC QN AUTO: 30.4 PG (ref 27–31)
MCHC RBC AUTO-ENTMCNC: 33.2 G/DL (ref 32–36)
MCV RBC AUTO: 92 FL (ref 82–98)
MONOCYTES # BLD AUTO: 0.5 K/UL (ref 0.3–1)
MONOCYTES NFR BLD: 8.6 % (ref 4–15)
NEUTROPHILS # BLD AUTO: 3.5 K/UL (ref 1.8–7.7)
NEUTROPHILS NFR BLD: 63.2 % (ref 38–73)
NRBC BLD-RTO: 0 /100 WBC
OHS QRS DURATION: 84 MS
OHS QTC CALCULATION: 385 MS
PLATELET # BLD AUTO: 261 K/UL (ref 150–450)
PMV BLD AUTO: 9.7 FL (ref 9.2–12.9)
POTASSIUM SERPL-SCNC: 4.3 MMOL/L (ref 3.5–5.1)
PROT SERPL-MCNC: 6.6 G/DL (ref 6–8.4)
RBC # BLD AUTO: 4.74 M/UL (ref 4.6–6.2)
SODIUM SERPL-SCNC: 141 MMOL/L (ref 136–145)
TROPONIN I SERPL DL<=0.01 NG/ML-MCNC: 5 NG/L (ref 0–35)
WBC # BLD AUTO: 5.57 K/UL (ref 3.9–12.7)

## 2025-02-19 PROCEDURE — 86803 HEPATITIS C AB TEST: CPT | Performed by: PHYSICIAN ASSISTANT

## 2025-02-19 PROCEDURE — 25000003 PHARM REV CODE 250: Performed by: INTERNAL MEDICINE

## 2025-02-19 PROCEDURE — 25500020 PHARM REV CODE 255: Performed by: INTERNAL MEDICINE

## 2025-02-19 PROCEDURE — 99285 EMERGENCY DEPT VISIT HI MDM: CPT | Mod: 25

## 2025-02-19 PROCEDURE — 63600175 PHARM REV CODE 636 W HCPCS: Performed by: INTERNAL MEDICINE

## 2025-02-19 PROCEDURE — 93454 CORONARY ARTERY ANGIO S&I: CPT | Performed by: INTERNAL MEDICINE

## 2025-02-19 PROCEDURE — 83880 ASSAY OF NATRIURETIC PEPTIDE: CPT | Performed by: EMERGENCY MEDICINE

## 2025-02-19 PROCEDURE — C1894 INTRO/SHEATH, NON-LASER: HCPCS | Performed by: INTERNAL MEDICINE

## 2025-02-19 PROCEDURE — G0378 HOSPITAL OBSERVATION PER HR: HCPCS

## 2025-02-19 PROCEDURE — 63600175 PHARM REV CODE 636 W HCPCS: Performed by: STUDENT IN AN ORGANIZED HEALTH CARE EDUCATION/TRAINING PROGRAM

## 2025-02-19 PROCEDURE — 87389 HIV-1 AG W/HIV-1&-2 AB AG IA: CPT | Performed by: PHYSICIAN ASSISTANT

## 2025-02-19 PROCEDURE — 85025 COMPLETE CBC W/AUTO DIFF WBC: CPT | Performed by: EMERGENCY MEDICINE

## 2025-02-19 PROCEDURE — B2111ZZ FLUOROSCOPY OF MULTIPLE CORONARY ARTERIES USING LOW OSMOLAR CONTRAST: ICD-10-PCS | Performed by: INTERNAL MEDICINE

## 2025-02-19 PROCEDURE — 93005 ELECTROCARDIOGRAM TRACING: CPT

## 2025-02-19 PROCEDURE — 80053 COMPREHEN METABOLIC PANEL: CPT | Performed by: EMERGENCY MEDICINE

## 2025-02-19 PROCEDURE — 84484 ASSAY OF TROPONIN QUANT: CPT | Performed by: EMERGENCY MEDICINE

## 2025-02-19 PROCEDURE — 99152 MOD SED SAME PHYS/QHP 5/>YRS: CPT | Performed by: INTERNAL MEDICINE

## 2025-02-19 PROCEDURE — 99204 OFFICE O/P NEW MOD 45 MIN: CPT | Mod: ,,, | Performed by: INTERNAL MEDICINE

## 2025-02-19 PROCEDURE — C1769 GUIDE WIRE: HCPCS | Performed by: INTERNAL MEDICINE

## 2025-02-19 PROCEDURE — 99214 OFFICE O/P EST MOD 30 MIN: CPT | Mod: 25,GC,, | Performed by: INTERNAL MEDICINE

## 2025-02-19 PROCEDURE — 93454 CORONARY ARTERY ANGIO S&I: CPT | Mod: 26,,, | Performed by: INTERNAL MEDICINE

## 2025-02-19 PROCEDURE — 25000003 PHARM REV CODE 250

## 2025-02-19 PROCEDURE — 93010 ELECTROCARDIOGRAM REPORT: CPT | Mod: ,,, | Performed by: INTERNAL MEDICINE

## 2025-02-19 PROCEDURE — 99152 MOD SED SAME PHYS/QHP 5/>YRS: CPT | Mod: ,,, | Performed by: INTERNAL MEDICINE

## 2025-02-19 RX ORDER — HEPARIN SODIUM,PORCINE/D5W 25000/250
0-40 INTRAVENOUS SOLUTION INTRAVENOUS CONTINUOUS
Status: DISCONTINUED | OUTPATIENT
Start: 2025-02-19 | End: 2025-02-21

## 2025-02-19 RX ORDER — SODIUM CHLORIDE 0.9 % (FLUSH) 0.9 %
10 SYRINGE (ML) INJECTION EVERY 12 HOURS PRN
Status: DISCONTINUED | OUTPATIENT
Start: 2025-02-19 | End: 2025-02-25 | Stop reason: HOSPADM

## 2025-02-19 RX ORDER — DIPHENHYDRAMINE HCL 50 MG
50 CAPSULE ORAL ONCE
Status: DISCONTINUED | OUTPATIENT
Start: 2025-02-19 | End: 2025-02-19

## 2025-02-19 RX ORDER — MIDAZOLAM HYDROCHLORIDE 1 MG/ML
INJECTION INTRAMUSCULAR; INTRAVENOUS
Status: DISCONTINUED | OUTPATIENT
Start: 2025-02-19 | End: 2025-02-20

## 2025-02-19 RX ORDER — ACETAMINOPHEN 325 MG/1
650 TABLET ORAL EVERY 4 HOURS PRN
Status: DISCONTINUED | OUTPATIENT
Start: 2025-02-19 | End: 2025-02-20

## 2025-02-19 RX ORDER — LIDOCAINE HYDROCHLORIDE 20 MG/ML
INJECTION, SOLUTION EPIDURAL; INFILTRATION; INTRACAUDAL; PERINEURAL
Status: DISCONTINUED | OUTPATIENT
Start: 2025-02-19 | End: 2025-02-20

## 2025-02-19 RX ORDER — ATORVASTATIN CALCIUM 40 MG/1
40 TABLET, FILM COATED ORAL DAILY
Status: DISCONTINUED | OUTPATIENT
Start: 2025-02-20 | End: 2025-02-25 | Stop reason: HOSPADM

## 2025-02-19 RX ORDER — TALC
6 POWDER (GRAM) TOPICAL NIGHTLY PRN
Status: DISCONTINUED | OUTPATIENT
Start: 2025-02-19 | End: 2025-02-25 | Stop reason: HOSPADM

## 2025-02-19 RX ORDER — GLUCAGON 1 MG
1 KIT INJECTION
Status: DISCONTINUED | OUTPATIENT
Start: 2025-02-19 | End: 2025-02-22 | Stop reason: SDUPTHER

## 2025-02-19 RX ORDER — SODIUM CHLORIDE 0.9 % (FLUSH) 0.9 %
10 SYRINGE (ML) INJECTION
Status: DISCONTINUED | OUTPATIENT
Start: 2025-02-19 | End: 2025-02-25 | Stop reason: HOSPADM

## 2025-02-19 RX ORDER — ENOXAPARIN SODIUM 100 MG/ML
40 INJECTION SUBCUTANEOUS EVERY 24 HOURS
Status: DISCONTINUED | OUTPATIENT
Start: 2025-02-19 | End: 2025-02-19

## 2025-02-19 RX ORDER — SODIUM CHLORIDE 9 MG/ML
INJECTION, SOLUTION INTRAVENOUS CONTINUOUS
Status: ACTIVE | OUTPATIENT
Start: 2025-02-19 | End: 2025-02-19

## 2025-02-19 RX ORDER — FENTANYL CITRATE 50 UG/ML
INJECTION, SOLUTION INTRAMUSCULAR; INTRAVENOUS
Status: DISCONTINUED | OUTPATIENT
Start: 2025-02-19 | End: 2025-02-20

## 2025-02-19 RX ORDER — ASPIRIN 325 MG
325 TABLET ORAL ONCE
Status: DISCONTINUED | OUTPATIENT
Start: 2025-02-19 | End: 2025-02-20

## 2025-02-19 RX ORDER — ONDANSETRON HYDROCHLORIDE 2 MG/ML
4 INJECTION, SOLUTION INTRAVENOUS EVERY 12 HOURS PRN
Status: DISCONTINUED | OUTPATIENT
Start: 2025-02-19 | End: 2025-02-20

## 2025-02-19 RX ORDER — LAMOTRIGINE 100 MG/1
200 TABLET ORAL DAILY
Status: DISCONTINUED | OUTPATIENT
Start: 2025-02-20 | End: 2025-02-25 | Stop reason: HOSPADM

## 2025-02-19 RX ORDER — NALOXONE HCL 0.4 MG/ML
0.02 VIAL (ML) INJECTION
Status: DISCONTINUED | OUTPATIENT
Start: 2025-02-19 | End: 2025-02-25 | Stop reason: HOSPADM

## 2025-02-19 RX ORDER — IBUPROFEN 200 MG
16 TABLET ORAL
Status: DISCONTINUED | OUTPATIENT
Start: 2025-02-19 | End: 2025-02-22 | Stop reason: SDUPTHER

## 2025-02-19 RX ORDER — LORAZEPAM 0.5 MG/1
0.5 TABLET ORAL 2 TIMES DAILY
Status: DISCONTINUED | OUTPATIENT
Start: 2025-02-19 | End: 2025-02-25 | Stop reason: HOSPADM

## 2025-02-19 RX ORDER — NAPROXEN SODIUM 220 MG/1
81 TABLET, FILM COATED ORAL DAILY
Status: DISCONTINUED | OUTPATIENT
Start: 2025-02-20 | End: 2025-02-25 | Stop reason: HOSPADM

## 2025-02-19 RX ORDER — ONDANSETRON 8 MG/1
8 TABLET, ORALLY DISINTEGRATING ORAL EVERY 8 HOURS PRN
Status: DISCONTINUED | OUTPATIENT
Start: 2025-02-19 | End: 2025-02-21

## 2025-02-19 RX ORDER — IBUPROFEN 200 MG
24 TABLET ORAL
Status: DISCONTINUED | OUTPATIENT
Start: 2025-02-19 | End: 2025-02-22 | Stop reason: SDUPTHER

## 2025-02-19 RX ORDER — ACETAMINOPHEN 325 MG/1
650 TABLET ORAL EVERY 4 HOURS PRN
Status: DISCONTINUED | OUTPATIENT
Start: 2025-02-19 | End: 2025-02-21

## 2025-02-19 RX ORDER — HEPARIN SOD,PORCINE/0.9 % NACL 1000/500ML
INTRAVENOUS SOLUTION INTRAVENOUS
Status: DISCONTINUED | OUTPATIENT
Start: 2025-02-19 | End: 2025-02-20

## 2025-02-19 RX ADMIN — HEPARIN SODIUM 12 UNITS/KG/HR: 10000 INJECTION, SOLUTION INTRAVENOUS at 09:02

## 2025-02-19 RX ADMIN — LORAZEPAM 0.5 MG: 0.5 TABLET ORAL at 09:02

## 2025-02-19 NOTE — ASSESSMENT & PLAN NOTE
PMHx of CAD on CT and positive stress echo who presents with chest pain. EKG negative for ischemic changes. Biomarkers have been unremarkable on initial evaluation.    Case discussed with Interventional Cardiology who agrees with plans below    Recommendations  would admit patient to hospital medicine  NPO since 9am  Consented for LHC  prn sublingual nitroglycerin if has chest pain  Plan is for LHC/angiogram +/- PCI.   Continue lipitor, increase to 40mg in AM  Echo in AM  GDMT/HTN mgmt will be determined based on cath results prior to discharge

## 2025-02-19 NOTE — PLAN OF CARE
Patient received back to SSCU room 11 post procedure. Report received from ELLIE Canela. Patient is AAOx3. VSS. Bedrest instructions reviewed with patient. All questions answered. RIght radial vasc band in place. Small stable hematoma noted. .no active bleeding noted. . + radial pulses palpable. Call light placed within reach. Family notified.

## 2025-02-19 NOTE — HPI
69-year-old male with PMHx of HLD, seizures, and CAD on CT chest who presents with episodes of chest pain. Recently, he underwent exercise stress echo 1/31/25 as part of pre-op evaluation to be a kidney donor. Stress test was positive revealing: The study is consistent with ischemia and consistent with scar and probably 3 vessel CAD. He was unable to make a follow up cardiology appointment with Dr. Moseley 2/4/25 and was scheduled a month from now. He had not been having symptoms of chest pain prior to stress test. He had been exercising regularly without limitation but stopped after this positive study. Yesterday, he woke up with a feeling of a mild chest pressure to his anterior chest. It lasted for about 45 minutes. He was states it felt like a 1 lb weight on his chest. No associated dyspnea. He was had episodes of sharp chest pain to his left lateral chest the past 2 days that go away quickly. Symptoms not brought on by exertion. Currently chest pain free and hemodynamically stable. He does not have symptoms with walking or any exertion even though he has reduced his exertion recently. He was concerned about this chest discomfort and was instructed to go to the emergency department.

## 2025-02-19 NOTE — BRIEF OP NOTE
Brief Operative Note:    : Ari Latham III, MD     Referring Physician: Self,Aaareferral     All Operators: Surgeon(s):  Adrian Pedersen MD Pantlin, Peter G., MD Thota, Pavankumar, MD Grant, Arthur G. III, MD     Preoperative Diagnosis: Chest pain [R07.9]     Postop Diagnosis: Chest pain [R07.9]    Treatments/Procedures: Procedure(s) (LRB):  ANGIOGRAM, CORONARY ARTERY (Right)    Access: Right radial artery    Findings:Severe coronary artery disease is present.     See catheterization report for full details.    Intervention:    See catheterization report for full details.    Closure device: Vascband       Plan:  - Post cath protocol   - IVF @ 125 cc/kg/hr x 2 hours  - Bed rest x 2 hours   - Continue aspirin 81 mg daily indefinitely  - Continue high intensity statin therapy (LDL goal < 70)  - Risk factor reduction (BP <130/80 mmHg, glycemic control, etc)  - CTS surgery consult for 3 vessel CAD  - Follow up with outpatient cardiologist    Estimated Blood loss: 20 cc    Specimens removed: No    Dallas Acharya MD

## 2025-02-19 NOTE — H&P
[facility] Medicine  History & Physical    Patient Name: Ye Partida  MRN: [id number]  Patient Class: OP- Observation  Admission Date: 2/19/2025  Attending Physician: King Spencer, *   Primary Care Provider: Cameron Tran MD         Patient information was obtained from patient, spouse/SO, past medical records, and ER records.     Subjective:     Principal Problem:Chest pain    Chief Complaint:   Chief Complaint   Patient presents with    Chest Pain     Recent stress echo        HPI: Mr. Ye Partida is a 69 year old male with a PMHx of CAD, HTN, seizure disorder, and peripheral neuropathy who presents to [facility]  after having chest pain for 3 days. Stress test done for a preop evaluation to be a kidney donor was positive. Stress echo revealed EF 50-55%, EKG revealed ST seg depression in inferolateral leads (II, III, aVF, V5 and V6), post stress EF reduced to 43%. Patient states that 5 days ago he began to notice sharp left sided pain that was intermittent and occurred in the morning, then the past 2 days including this morning, noticed anterior chest heaviness that lasted 45 minutes, which self resolved without nitroglycerin or pain medication. Pain was occurring at rest in the morning.     Pt denies chest pain, sob, cough, sore throat, abdominal pain, n/v/d, constipation, fever, chills, headache, numbness or tingling or weakness of the extremities     ED Course:  In the ED, patient AF, HR 75, RR 20, /92. Initial labs CBC, CMP unremarkable, BNP 78, and negative troponin. EKG showed NSR Qtc 385. CXR without acute cardiopulmonary process. Interventional cardiology consulted, planning for LHC +/- PCI afternoon of admission. Admitted to hospital medicine for further evaluation     Past Medical History:   Diagnosis Date    Hyperlipidemia     Pyloric stenosis, congenital     s/p repair    Seizures        Past Surgical History:   Procedure Laterality Date    pyloric stenosis       TONSILLECTOMY         Review of patient's allergies indicates:   Allergen Reactions    Antihistamines - alkylamine Other (See Comments)     Heart race    Codeine Other (See Comments)     Heart race       No current facility-administered medications on file prior to encounter.     Current Outpatient Medications on File Prior to Encounter   Medication Sig    ascorbic acid (VITAMIN C) 1000 MG tablet Take 1,000 mg by mouth once daily.    cyanocobalamin (VITAMIN B-12) 100 MCG tablet Take 100 mcg by mouth once daily.    lamotrigine (LAMICTAL) 200 MG tablet Take 1 tablet by mouth  daily    lorazepam (ATIVAN) 0.5 MG tablet Take 1 tablet (0.5 mg total) by mouth 2 (two) times daily. Take 1/2 tablet (0.25mg) in am, take 1 tablet (0.5mg) in pm (Patient taking differently: Take 0.5 mg by mouth 2 (two) times daily.)     Family History       Problem Relation (Age of Onset)    Alcohol abuse Sister, Brother    Arthritis Mother    Cancer Mother, Father    Crohn's disease Son    Diabetes Son    Heart disease Sister    Hypertension Mother, Sister, Brother    Kidney disease Son    Obesity Sister, Brother          Tobacco Use    Smoking status: Former     Current packs/day: 0.00     Types: Cigarettes     Quit date:      Years since quittin.1    Smokeless tobacco: Never   Substance and Sexual Activity    Alcohol use: No    Drug use: No    Sexual activity: Yes     Partners: Female     Review of Systems  Objective:     Vital Signs (Most Recent):  Temp: 98 °F (36.7 °C) (25 1051)  Pulse: 75 (25 1051)  Resp: 20 (25 1051)  BP: (!) 182/98 (25 1051)  SpO2: 100 % (25 1051) Vital Signs (24h Range):  Temp:  [98 °F (36.7 °C)] 98 °F (36.7 °C)  Pulse:  [75] 75  Resp:  [20] 20  SpO2:  [100 %] 100 %  BP: (182)/(98) 182/98     Weight: 66.7 kg (147 lb)  Body mass index is 22.35 kg/m².     Physical Exam  Constitutional:       General: He is not in acute distress.     Appearance: Normal appearance. He is not  ill-appearing.   HENT:      Head: Normocephalic and atraumatic.      Mouth/Throat:      Mouth: Mucous membranes are moist.   Eyes:      Extraocular Movements: Extraocular movements intact.      Conjunctiva/sclera: Conjunctivae normal.   Cardiovascular:      Rate and Rhythm: Normal rate and regular rhythm.      Heart sounds: Normal heart sounds.   Pulmonary:      Effort: Pulmonary effort is normal. No respiratory distress.      Breath sounds: Normal breath sounds.   Abdominal:      General: Abdomen is flat. Bowel sounds are normal. There is no distension.      Palpations: Abdomen is soft.      Tenderness: There is no abdominal tenderness. There is no guarding.   Musculoskeletal:         General: No swelling.      Right lower leg: No edema.      Left lower leg: No edema.   Skin:     General: Skin is warm.   Neurological:      General: No focal deficit present.      Mental Status: He is alert and oriented to person, place, and time.   Psychiatric:         Mood and Affect: Mood normal.         Behavior: Behavior normal.                Significant Labs: All pertinent labs within the past 24 hours have been reviewed.    Significant Imaging: I have reviewed all pertinent imaging results/findings within the past 24 hours.  Assessment/Plan:     * Chest pain  68 yo M CAD, HTN, seizure disorder, and peripheral neuropathy who [place] [facility]  [place] having chest pain for 3 days. Stress test done for a preop evaluation to be a kidney donor was positive. Stress echo revealed EF 50-55%, EKG revealed ST seg depression in inferolateral leads (II, III, aVF, V5 and V6), post stress EF reduced to 43%. Patient states that 5 days ago he began to notice sharp left sided pain that was intermittent and occurred in the morning, then the past 2 days including this morning, noticed anterior chest heaviness that lasted 45 minutes, which self resolved without nitroglycerin or pain medication. Pain was occurring at rest in the morning. Pt  currently denies chest pain, sob, cough, sore throat, abdominal pain, n/v/d, constipation, fever, chills, headache, numbness or tingling or weakness of the extremities In the ED, patient AF, HR 75, RR 20, /92. Initial labs CBC, CMP unremarkable, BNP 78, and negative troponin. EKG showed NSR Qtc 385. CXR without acute cardiopulmonary process. Interventional cardiology consulted, planning for LHC +/- PCI afternoon of admission.     - LHC +/- PCI   - ASA load 325 mg, otherwise not on ACS protocol per recommendation of IC   - fu interventional cardiology recs for consideration of DAPT if stents placed   - continue telemetry  - maintain Mg >2, K>4, P >3     Generalized convulsive epilepsy with intractable epilepsy  - resumed home lamotridine 200mg QD   - resumed home ativan 0.5 mg BID         VTE Risk Mitigation (From admission, onward)           Ordered     enoxaparin injection 40 mg  Daily         02/19/25 1430     heparin infusion 1,000 units/500 ml in 0.9% NaCl (on sterile field)  As needed (PRN)         02/19/25 1533     IP VTE HIGH RISK PATIENT  Once         02/19/25 1430     Place sequential compression device  Until discontinued         02/19/25 1430                           On 02/19/2025, patient should be placed in hospital observation services under my care in collaboration with Dr. Spencer.         Dariela Siddiqui MD  Department of Hospital Medicine  [facility] [address] - Cath Lab

## 2025-02-19 NOTE — ASSESSMENT & PLAN NOTE
PMHx of CAD on CT and positive stress echo who presents with chest pain. EKG negative for ischemic changes. Biomarkers have been unremarkable on initial evaluation.    Case discussed with Interventional Cardiology who agrees with plans below     would admit patient to hospital medicine  NPO since 9am  Consented for C  prn sublingual nitroglycerin if has chest pain     - Plan is for LHC/angiogram +/- PCI.   - TTE: 60%  - Anti-platelet therapy: none  - Access: R radial  - Catheters: Haim  - Creatinine/CrCl: 1.2  - Hemoglobin: 14.4  - Allergies: no contrast allergies.   - Pre-op hydration: 3 cc/kg/hr x 1 hour  - Pre-op med: 50 mg Benadryl    All patient's questions were answered.  The risks, benefits and alternatives of the procedure were explained to the patient.   The risks of coronary angiography include but are not limited to: bleeding, infection, heart rhythm abnormalities, allergic reactions, kidney injury and potential need for dialysis, stroke and death.   Should stenting be indicated, the patient has agreed to dual anti-platelet therapy for 1-consecutive year with a drug-eluting stent and a minimum of 1-month with the use of a bare metal stent  Additionally, pt is aware that non-compliance is likely to result in stent clotting with heart attack, heart failure, and/or death  The risks of moderate sedation include hypotension, respiratory depression, arrhythmias, bronchospasm, and death.   Informed consent was obtained and the  patient is agreeable to proceed with the procedure.

## 2025-02-19 NOTE — CONSULTS
[facility] Emergency Dept  Cardiology  Consult Note    Patient Name: Ye Partida  MRN: [id number]  Admission Date: 2/19/2025  Hospital Length of Stay: 0 days  Code Status: Full Code   Attending Provider: King Spencer, *   Consulting Provider: Edwardo De Jesus MD  Primary Care Physician: Cameron Tran MD  Principal Problem:Chest pain    Patient information was obtained from patient, spouse/SO, past medical records, and ER records.     Consults  Subjective:     Chief Complaint: Chest pain, positive stress test     HPI:   69-year-old male with PMHx of HLD, seizures, and CAD on CT chest who presents with episodes of chest pain. Recently, he underwent exercise stress echo 1/31/25 as part of pre-op evaluation to be a kidney donor. Stress test was positive revealing: The study is consistent with ischemia and consistent with scar and probably 3 vessel CAD. He was unable to make a follow up cardiology appointment with Dr. Moseley 2/4/25 and was scheduled a month from now. He had not been having symptoms of chest pain prior to stress test. He had been exercising regularly without limitation but stopped after this positive study. Yesterday, he woke up with a feeling of a mild chest pressure to his anterior chest. It lasted for about 45 minutes. He was states it felt like a 1 lb weight on his chest. No associated dyspnea. He was had episodes of sharp chest pain to his left lateral chest the past 2 days that go away quickly. Symptoms not brought on by exertion. Currently chest pain free and hemodynamically stable. He does not have symptoms with walking or any exertion even though he has reduced his exertion recently. He was concerned about this chest discomfort and was instructed to go to the emergency department.     Past Medical History:   Diagnosis Date    Hyperlipidemia     Pyloric stenosis, congenital     s/p repair    Seizures        Past Surgical History:   Procedure Laterality Date    pyloric stenosis       TONSILLECTOMY         Review of patient's allergies indicates:   Allergen Reactions    Antihistamines - alkylamine Other (See Comments)     Heart race    Codeine Other (See Comments)     Heart race       No current facility-administered medications on file prior to encounter.     Current Outpatient Medications on File Prior to Encounter   Medication Sig    ascorbic acid (VITAMIN C) 1000 MG tablet Take 1,000 mg by mouth once daily.    cyanocobalamin (VITAMIN B-12) 100 MCG tablet Take 100 mcg by mouth once daily.    lamotrigine (LAMICTAL) 200 MG tablet Take 1 tablet by mouth  daily    lorazepam (ATIVAN) 0.5 MG tablet Take 1 tablet (0.5 mg total) by mouth 2 (two) times daily. Take 1/2 tablet (0.25mg) in am, take 1 tablet (0.5mg) in pm (Patient taking differently: Take 0.5 mg by mouth 2 (two) times daily.)     Family History       Problem Relation (Age of Onset)    Alcohol abuse Sister, Brother    Arthritis Mother    Cancer Mother, Father    Crohn's disease Son    Diabetes Son    Heart disease Sister    Hypertension Mother, Sister, Brother    Kidney disease Son    Obesity Sister, Brother          Tobacco Use    Smoking status: Former     Current packs/day: 0.00     Types: Cigarettes     Quit date:      Years since quittin.    Smokeless tobacco: Never   Substance and Sexual Activity    Alcohol use: No    Drug use: No    Sexual activity: Yes     Partners: Female     Review of Systems   Constitutional: Negative for chills, diaphoresis and night sweats.   Cardiovascular:  Positive for chest pain. Negative for dyspnea on exertion, irregular heartbeat, leg swelling, near-syncope, palpitations and syncope.   Respiratory:  Negative for cough, shortness of breath and wheezing.    Skin:  Negative for color change and dry skin.   Musculoskeletal:  Negative for joint pain and joint swelling.   Gastrointestinal:  Negative for abdominal pain, diarrhea, nausea and vomiting.   Genitourinary:  Negative for dysuria.    Neurological:  Negative for dizziness, headaches, light-headedness and weakness.   All other systems reviewed and are negative.    Objective:     Vital Signs (Most Recent):  Temp: 98 °F (36.7 °C) (02/19/25 1051)  Pulse: 75 (02/19/25 1051)  Resp: 20 (02/19/25 1051)  BP: (!) 182/98 (02/19/25 1051)  SpO2: 100 % (02/19/25 1051) Vital Signs (24h Range):  Temp:  [98 °F (36.7 °C)] 98 °F (36.7 °C)  Pulse:  [75] 75  Resp:  [20] 20  SpO2:  [100 %] 100 %  BP: (182)/(98) 182/98     Weight: 66.7 kg (147 lb)  Body mass index is 22.35 kg/m².    SpO2: 100 %       No intake or output data in the 24 hours ending 02/19/25 1500    Lines/Drains/Airways       Peripheral Intravenous Line  Duration                  Peripheral IV - Single Lumen 02/19/25 1153 20 G Left Antecubital <1 day                     Physical Exam  Constitutional:       Appearance: He is well-developed.   HENT:      Head: Normocephalic and atraumatic.   Eyes:      Pupils: Pupils are equal, round, and reactive to light.   Neck:      Vascular: No JVD.   Cardiovascular:      Rate and Rhythm: Normal rate and regular rhythm. No extrasystoles are present.     Pulses: Intact distal pulses.           Radial pulses are 2+ on the right side and 2+ on the left side.        Femoral pulses are 2+ on the right side and 2+ on the left side.     Heart sounds: Normal heart sounds. No murmur heard.  Pulmonary:      Effort: Pulmonary effort is normal.      Breath sounds: Normal breath sounds. No wheezing or rales.   Abdominal:      General: Bowel sounds are normal. There is no distension.      Palpations: Abdomen is soft.      Tenderness: There is no abdominal tenderness.   Musculoskeletal:         General: Normal range of motion.      Cervical back: Normal range of motion.      Right lower leg: No edema.      Left lower leg: No edema.   Skin:     General: Skin is warm and dry.      Capillary Refill: Capillary refill takes less than 2 seconds.      Findings: No erythema or rash.  "  Neurological:      General: No focal deficit present.      Mental Status: He is alert and oriented to person, place, and time.   Psychiatric:         Thought Content: Thought content normal.         Judgment: Judgment normal.          Significant Labs: CMP   Recent Labs   Lab 02/19/25  1154      K 4.3      CO2 28   GLU 93   BUN 14   CREATININE 1.2   CALCIUM 8.8   PROT 6.6   ALBUMIN 3.7   BILITOT 0.3   ALKPHOS 70   AST 55*   ALT 23   ANIONGAP 9   , CBC   Recent Labs   Lab 02/19/25  1154   WBC 5.57   HGB 14.4   HCT 43.4      , INR No results for input(s): "INR", "PROTIME" in the last 48 hours., Lipid Panel No results for input(s): "CHOL", "HDL", "LDLCALC", "TRIG", "CHOLHDL" in the last 48 hours., and Troponin   Recent Labs   Lab 02/19/25  1154   TROPONINIHS 5     Significant Imaging:   Exercise stress echo 1/31/25    Left Ventricle: The left ventricle is normal in size. Ventricular mass is normal. Normal wall thickness. Regional wall motion abnormalities present. See diagram for wall motion findings. There is low normal to slightly reduced  systolic function with a visually estimated ejection fraction of 50 - 55%. Ejection fraction is approximately 50%. There is normal diastolic function.    Right Ventricle: Normal right ventricular cavity size. Wall thickness is normal. Systolic function is normal.    Mitral Valve: There is mild bileaflet sclerosis. There is mild regurgitation.    Pulmonic Valve: There is mild regurgitation.    IVC/SVC: Normal venous pressure at 3 mmHg.    Baseline ECG: The Baseline ECG reveals sinus rhythm. The axis is normal. The ST segments are normal.    Stress ECG: There is 1.5 mm of downward-sloping ST segment depression in the inferolateral leads (II, III, aVF, V5 and V6) noted during stress. There are no arrhythmias during stress. There is normal blood pressure response with stress.    ECG Conclusion: The ECG portion of the study is positive for ischemia.    Post-stress " Echo: The left ventricle systolic function is mildly decreased with an EF of 43%. The post-stress echo showed wall motion abnormalities compared to baseline. Right ventricle systolic function is normal.    Post-stress Conclusion: The study is consistent with ischemia and consistent with scar and probably 3 vessel CAD.  Assessment and Plan:     * Chest pain  PMHx of CAD on CT and positive stress echo who presents with chest pain. EKG negative for ischemic changes. Biomarkers have been unremarkable on initial evaluation.    Case discussed with Interventional Cardiology who agrees with plans below    Recommendations  would admit patient to hospital medicine  NPO since 9am  Consented for C  prn sublingual nitroglycerin if has chest pain  Plan is for LHC/angiogram +/- PCI.   Continue lipitor, increase to 40mg in AM  Echo in AM  GDMT/HTN mgmt will be determined based on cath results prior to discharge    Primary hypertension  Patient's blood pressure range in the last 24 hours was: BP  Min: 182/98  Max: 182/98.The patient's inpatient anti-hypertensive regimen is listed below:  Current Antihypertensives  None    Plan  - BP elevated in the ED with no history of HTN and not on medications  - Monitor pressure overnight and will assess after LHC    Mixed hyperlipidemia  LDL 79, on lipitor 10  Consider increasing to 40mg        Thank you for your consult. I will follow-up with patient. Please contact us if you have any additional questions.    Edwardo De Jesus MD  Cardiology   [facility] - Emergency Dept  I have personally taken the history and examined the patient and agree with the resident's note as stated above.Normally would load with Ticagrelor or at least clopidogrel pre cath but based on stress, he could easily need CABG.

## 2025-02-19 NOTE — HPI
Mr. Ye Partida is a 69 year old male with a PMHx of CAD, HTN, seizure disorder, and peripheral neuropathy who [place] [facility]  after having chest pain for 3 days. Stress test done for a preop evaluation to be a kidney donor was positive. Stress echo revealed EF 50-55%, EKG revealed ST seg depression in inferolateral leads (II, III, aVF, V5 and V6), post stress EF reduced to 43%. Patient states that 5 days ago he began to notice sharp left sided pain that was intermittent and occurred in the morning, then the past 2 days including this morning, noticed anterior chest heaviness that lasted 45 minutes, which self resolved without nitroglycerin or pain medication. Pain was occurring at rest in the morning.     Pt denies chest pain, sob, cough, sore throat, abdominal pain, n/v/d, constipation, fever, chills, headache, numbness or tingling or weakness of the extremities     ED Course:  In the ED, patient AF, HR 75, RR 20, /92. Initial labs CBC, CMP unremarkable, BNP 78, and negative troponin. EKG showed NSR Qtc 385. CXR without acute cardiopulmonary process. Interventional cardiology consulted, planning for LHC +/- PCI afternoon of admission. Admitted to hospital medicine for further evaluation

## 2025-02-19 NOTE — ASSESSMENT & PLAN NOTE
70 yo M CAD, HTN, seizure disorder, and peripheral neuropathy who presents to [facility]  after having chest pain for 3 days. Stress test done for a preop evaluation to be a kidney donor was positive. Stress echo revealed EF 50-55%, EKG revealed ST seg depression in inferolateral leads (II, III, aVF, V5 and V6), post stress EF reduced to 43%. Patient states that 5 days ago he began to notice sharp left sided pain that was intermittent and occurred in the morning, then the past 2 days including this morning, noticed anterior chest heaviness that lasted 45 minutes, which self resolved without nitroglycerin or pain medication. Pain was occurring at rest in the morning. Pt currently denies chest pain, sob, cough, sore throat, abdominal pain, n/v/d, constipation, fever, chills, headache, numbness or tingling or weakness of the extremities In the ED, patient AF, HR 75, RR 20, /92. Initial labs CBC, CMP unremarkable, BNP 78, and negative troponin. EKG showed NSR Qtc 385. CXR without acute cardiopulmonary process. Interventional cardiology consulted, planning for LHC +/- PCI afternoon of admission.     - LHC +/- PCI   - ASA load 325 mg, otherwise not on ACS protocol per recommendation of IC   -  interventional cardiology recs for consideration of DAPT if stents placed   - continue telemetry  - maintain Mg >2, K>4, P >3

## 2025-02-19 NOTE — ED NOTES
Intermittent chest pain x 3 days, currently chest pain free= states recent abnormal stress test/ denies shortness of breath

## 2025-02-19 NOTE — ASSESSMENT & PLAN NOTE
Patient's blood pressure range in the last 24 hours was: BP  Min: 182/98  Max: 182/98.The patient's inpatient anti-hypertensive regimen is listed below:  Current Antihypertensives  None    Plan  - BP elevated in the ED with no history of HTN and not on medications  - Monitor pressure overnight and will assess after LHC

## 2025-02-19 NOTE — ED PROVIDER NOTES
Encounter Date: 2/19/2025       History     Chief Complaint   Patient presents with    Chest Pain     Recent stress echo     69-year-old male presents with episodes of chest pain.   He had a stress test done in January it was a preop evaluation to be a kidney donor.  It was positive revealing: The study is consistent with ischemia and consistent with scar and probably 3 vessel CAD.   He was unable to make a follow up cardiology appointment and has 1 scheduled a month from now.  He had not been having symptoms of chest pain.  He used to exercise regularly but stopped after this positive study.  Yesterday he woke up with a feeling of a mild chest pressure to his anterior chest.  It lasted for about 45 minutes.  He was states it felt like a 1 lb weight on his chest.  No associated dyspnea.  He was had episodes of sharp chest pain to his left lateral chest.  Sometimes he gets arm discomfort to his shoulders.  None of the symptoms are present now.  He does not have symptoms with walking or any exertion even though he has reduced his exertion recently.  He was concerned about this chest discomfort and was instructed to go to the emergency department.        Review of patient's allergies indicates:   Allergen Reactions    Antihistamines - alkylamine Other (See Comments)     Heart race    Codeine Other (See Comments)     Heart race     Past Medical History:   Diagnosis Date    Hyperlipidemia     Pyloric stenosis, congenital     s/p repair    Seizures      Past Surgical History:   Procedure Laterality Date    CORONARY ANGIOGRAPHY Right 2/19/2025    Procedure: ANGIOGRAM, CORONARY ARTERY;  Surgeon: Ari Latham III, MD;  Location: [facility] CATH LAB;  Service: Cardiology;  Laterality: Right;    pyloric stenosis      TONSILLECTOMY       Family History   Problem Relation Name Age of Onset    Cancer Mother          breast ca    Arthritis Mother      Hypertension Mother      Cancer Father          prostate ca    Alcohol abuse  Sister      Hypertension Sister      Obesity Sister      Heart disease Sister      Alcohol abuse Brother      Hypertension Brother      Obesity Brother      Kidney disease Son      Diabetes Son      Crohn's disease Son       Social History[1]  Review of Systems    Physical Exam     Initial Vitals [02/19/25 1051]   BP Pulse Resp Temp SpO2   (!) 182/98 75 20 98 °F (36.7 °C) 100 %      MAP       --         Physical Exam    Constitutional: He appears well-developed and well-nourished. No distress.   HENT:   Head: Normocephalic and atraumatic. Mouth/Throat: Oropharynx is clear and moist.   Eyes: Conjunctivae and EOM are normal. Pupils are equal, round, and reactive to light. Right eye exhibits no discharge. Left eye exhibits no discharge. No scleral icterus.   Neck: Neck supple. No JVD present.   Normal range of motion.  Cardiovascular:  Normal rate, regular rhythm, normal heart sounds and intact distal pulses.     Exam reveals no friction rub.       No murmur heard.  Pulmonary/Chest: Breath sounds normal. No stridor. No respiratory distress. He has no wheezes. He has no rhonchi. He has no rales.   Abdominal: Abdomen is soft. Bowel sounds are normal. He exhibits no distension and no mass. There is no abdominal tenderness. There is no rebound and no guarding.   Musculoskeletal:         General: No tenderness or edema. Normal range of motion.      Cervical back: Normal range of motion and neck supple.     Lymphadenopathy:     He has no cervical adenopathy.   Neurological: He is alert. He has normal strength. No cranial nerve deficit or sensory deficit. GCS score is 15. GCS eye subscore is 4. GCS verbal subscore is 5. GCS motor subscore is 6.   Skin: Skin is warm. Capillary refill takes less than 2 seconds. No rash noted.   Psychiatric: He has a normal mood and affect.         ED Course   Procedures  Labs Reviewed   CBC W/ AUTO DIFFERENTIAL - Abnormal       Result Value    WBC 5.57      RBC 4.74      Hemoglobin 14.4       Hematocrit 43.4      MCV 92      MCH 30.4      MCHC 33.2      RDW 15.1 (*)     Platelets 261      MPV 9.7      Immature Granulocytes 0.4      Gran # (ANC) 3.5      Immature Grans (Abs) 0.02      Lymph # 1.3      Mono # 0.5      Eos # 0.3      Baso # 0.04      nRBC 0      Gran % 63.2      Lymph % 22.6      Mono % 8.6      Eosinophil % 4.5      Basophil % 0.7      Differential Method Automated     COMPREHENSIVE METABOLIC PANEL - Abnormal    Sodium 141      Potassium 4.3      Chloride 104      CO2 28      Glucose 93      BUN 14      Creatinine 1.2      Calcium 8.8      Total Protein 6.6      Albumin 3.7      Total Bilirubin 0.3      Alkaline Phosphatase 70      AST 55 (*)     ALT 23      eGFR >60.0      Anion Gap 9     HEPATITIS C ANTIBODY    Hepatitis C Ab Non-reactive      Narrative:     Release to patient->Immediate   HIV 1 / 2 ANTIBODY    HIV 1/2 Ag/Ab Non-reactive      Narrative:     Release to patient->Immediate   TROPONIN I HIGH SENSITIVITY    Troponin I High Sensitivity 5     B-TYPE NATRIURETIC PEPTIDE    BNP 78     TYPE & SCREEN     EKG Readings: (Independently Interpreted)   Normal sinus rhythm at 66 without acute ischemic changes.     ECG Results              EKG 12-lead (Final result)        Collection Time Result Time QRS Duration [facility] QTC Calculation    02/19/25 10:57:15 02/19/25 14:37:19 84 385                     Final result by Interface, Lab In [facility] (02/19/25 14:37:27)                   Narrative:    Test Reason : R07.89,    Vent. Rate :  66 BPM     Atrial Rate :  66 BPM     P-R Int : 142 ms          QRS Dur :  84 ms      QT Int : 368 ms       P-R-T Axes :  45  62  47 degrees    QTcB Int : 385 ms    Normal sinus rhythm  Possible Left atrial enlargement  Borderline Abnormal ECG  When compared with ECG of 31-Jan-2025 08:11,  No significant change was found  Confirmed by Joshua Fong ([id number]) on 2/19/2025 2:37:18 PM    Referred By:            Confirmed By: Joshua Fong                                   Imaging Results              X-Ray Chest AP Portable (Final result)  Result time 02/19/25 13:01:56      Final result by Loki Negron MD (02/19/25 13:01:56)                   Impression:      See above      Electronically signed by: Loki Negron MD  Date:    02/19/2025  Time:    13:01               Narrative:    EXAMINATION:  XR CHEST AP PORTABLE    CLINICAL HISTORY:  Chest Pain;    TECHNIQUE:  Single frontal view of the chest was performed.    COMPARISON:  N 01/29/2025 one    FINDINGS:  Heart size normal.  The lungs are clear.  No pleural effusion                                    X-Rays:   Independently Interpreted Readings:   Chest X-Ray: Normal heart size.  No infiltrates.  No acute abnormalities.     Medications   sodium chloride 0.9% flush 10 mL (has no administration in time range)   sodium chloride 0.9% flush 10 mL (has no administration in time range)   naloxone 0.4 mg/mL injection 0.02 mg (has no administration in time range)   glucose chewable tablet 16 g (has no administration in time range)   glucose chewable tablet 24 g (has no administration in time range)   dextrose 50% injection 12.5 g (has no administration in time range)   dextrose 50% injection 25 g (has no administration in time range)   glucagon (human recombinant) injection 1 mg (has no administration in time range)   acetaminophen tablet 650 mg (has no administration in time range)   ondansetron disintegrating tablet 8 mg (has no administration in time range)   melatonin tablet 6 mg (has no administration in time range)   lamoTRIgine tablet 200 mg (200 mg Oral Given 2/20/25 0827)   LORazepam tablet 0.5 mg (0.5 mg Oral Given 2/20/25 0827)   0.9% NaCl infusion (0 mL/hr Intravenous Stopped 2/19/25 1817)   aspirin chewable tablet 81 mg (81 mg Oral Given 2/20/25 0827)   heparin 25,000 units in dextrose 5% 250 mL (100 units/mL) infusion LOW INTENSITY nomogram - [facility] (12 Units/kg/hr × 66.7 kg Intravenous Restarted 2/20/25 1400)   heparin  25,000 units in dextrose 5% (100 units/ml) IV bolus from bag LOW INTENSITY nomogram - [facility] (has no administration in time range)   heparin 25,000 units in dextrose 5% (100 units/ml) IV bolus from bag LOW INTENSITY nomogram - [facility] (has no administration in time range)   atorvastatin tablet 40 mg (40 mg Oral Given 2/20/25 0827)   microplegia arrest solution (KCL 80mEq/40 mL) (has no administration in time range)   microplegia arrest solution (KCL 120mEq/60 mL) refill syringe (has no administration in time range)   microplegia protection soln (LIDOcaine 100mg/MAGNESIUM 4g/qs NS 40mL) (has no administration in time range)   AMINO ACID ENRICHED CARDIOPLEGIA SOLN (CARDIA REPERFUSATE) Dextrose-70% 17.3ml,Sterile water-102.7ml,CP2D soln-112.5ml,Tromethamine 0.3M-112.5ml,MSA/MSG 0.46M-125ml, KCl 2meq/ml-7.5ml Total volume (477.5ml) (has no administration in time range)   nitroGLYCERIN SL tablet 0.4 mg (has no administration in time range)   heparin 25,000 units in dextrose 5% (100 units/ml) IV bolus from bag LOW INTENSITY nomogram - [facility] (4,000 Units Intravenous Bolus from Bag 2/19/25 2129)   potassium chloride SA CR tablet 40 mEq (40 mEq Oral Given 2/20/25 0830)     Medical Decision Making  69-year-old male with recent stress echo concerning for coronary artery disease presented with chest pain yesterday.  He was asymptomatic and comfortable now.  Physical exam and EKG are reassuring.    Labs were reassuring.  Discussed the case with Cardiology.  They will see in consultation in the ED today.  Plan for cath lab    Amount and/or Complexity of Data Reviewed  Labs: ordered.  Radiology: ordered.               ED Course as of 02/20/25 1611   Wed Feb 19, 2025   1058 EKG at 10:58 a.m., no STEMI.  Unchanged prominent T-waves in the precordial leads from prior [MB]      ED Course User Index  [MB] Philipp Duong MD                           Clinical Impression:  Final diagnoses:  [R07.89] Chest discomfort  [R07.9] Chest pain           ED Disposition Condition    Observation                     [1]   Social History  Tobacco Use    Smoking status: Former     Current packs/day: 0.00     Types: Cigarettes     Quit date:      Years since quittin.    Smokeless tobacco: Never   Substance Use Topics    Alcohol use: No    Drug use: Boo Medina MD  [phone]

## 2025-02-19 NOTE — SUBJECTIVE & OBJECTIVE
Past Medical History:   Diagnosis Date    Hyperlipidemia     Pyloric stenosis, congenital     s/p repair    Seizures        Past Surgical History:   Procedure Laterality Date    pyloric stenosis      TONSILLECTOMY         Review of patient's allergies indicates:   Allergen Reactions    Antihistamines - alkylamine Other (See Comments)     Heart race    Codeine Other (See Comments)     Heart race       No current facility-administered medications on file prior to encounter.     Current Outpatient Medications on File Prior to Encounter   Medication Sig    ascorbic acid (VITAMIN C) 1000 MG tablet Take 1,000 mg by mouth once daily.    cyanocobalamin (VITAMIN B-12) 100 MCG tablet Take 100 mcg by mouth once daily.    lamotrigine (LAMICTAL) 200 MG tablet Take 1 tablet by mouth  daily    lorazepam (ATIVAN) 0.5 MG tablet Take 1 tablet (0.5 mg total) by mouth 2 (two) times daily. Take 1/2 tablet (0.25mg) in am, take 1 tablet (0.5mg) in pm (Patient taking differently: Take 0.5 mg by mouth 2 (two) times daily.)     Family History       Problem Relation (Age of Onset)    Alcohol abuse Sister, Brother    Arthritis Mother    Cancer Mother, Father    Crohn's disease Son    Diabetes Son    Heart disease Sister    Hypertension Mother, Sister, Brother    Kidney disease Son    Obesity Sister, Brother          Tobacco Use    Smoking status: Former     Current packs/day: 0.00     Types: Cigarettes     Quit date:      Years since quittin.    Smokeless tobacco: Never   Substance and Sexual Activity    Alcohol use: No    Drug use: No    Sexual activity: Yes     Partners: Female     Review of Systems   Constitutional: Negative for chills, diaphoresis and night sweats.   Cardiovascular:  Positive for chest pain. Negative for dyspnea on exertion, irregular heartbeat, leg swelling, near-syncope, palpitations and syncope.   Respiratory:  Negative for cough, shortness of breath and wheezing.    Skin:  Negative for color change and dry  skin.   Musculoskeletal:  Negative for joint pain and joint swelling.   Gastrointestinal:  Negative for abdominal pain, diarrhea, nausea and vomiting.   Genitourinary:  Negative for dysuria.   Neurological:  Negative for dizziness, headaches, light-headedness and weakness.   All other systems reviewed and are negative.    Objective:     Vital Signs (Most Recent):  Temp: 98 °F (36.7 °C) (02/19/25 1051)  Pulse: 75 (02/19/25 1051)  Resp: 20 (02/19/25 1051)  BP: (!) 182/98 (02/19/25 1051)  SpO2: 100 % (02/19/25 1051) Vital Signs (24h Range):  Temp:  [98 °F (36.7 °C)] 98 °F (36.7 °C)  Pulse:  [75] 75  Resp:  [20] 20  SpO2:  [100 %] 100 %  BP: (182)/(98) 182/98     Weight: 66.7 kg (147 lb)  Body mass index is 22.35 kg/m².    SpO2: 100 %       No intake or output data in the 24 hours ending 02/19/25 1500    Lines/Drains/Airways       Peripheral Intravenous Line  Duration                  Peripheral IV - Single Lumen 02/19/25 1153 20 G Left Antecubital <1 day                     Physical Exam  Constitutional:       Appearance: He is well-developed.   HENT:      Head: Normocephalic and atraumatic.   Eyes:      Pupils: Pupils are equal, round, and reactive to light.   Neck:      Vascular: No JVD.   Cardiovascular:      Rate and Rhythm: Normal rate and regular rhythm. No extrasystoles are present.     Pulses: Intact distal pulses.           Radial pulses are 2+ on the right side and 2+ on the left side.        Femoral pulses are 2+ on the right side and 2+ on the left side.     Heart sounds: Normal heart sounds. No murmur heard.  Pulmonary:      Effort: Pulmonary effort is normal.      Breath sounds: Normal breath sounds. No wheezing or rales.   Abdominal:      General: Bowel sounds are normal. There is no distension.      Palpations: Abdomen is soft.      Tenderness: There is no abdominal tenderness.   Musculoskeletal:         General: Normal range of motion.      Cervical back: Normal range of motion.      Right lower leg:  "No edema.      Left lower leg: No edema.   Skin:     General: Skin is warm and dry.      Capillary Refill: Capillary refill takes less than 2 seconds.      Findings: No erythema or rash.   Neurological:      General: No focal deficit present.      Mental Status: He is alert and oriented to person, place, and time.   Psychiatric:         Thought Content: Thought content normal.         Judgment: Judgment normal.          Significant Labs: CMP   Recent Labs   Lab 02/19/25  1154      K 4.3      CO2 28   GLU 93   BUN 14   CREATININE 1.2   CALCIUM 8.8   PROT 6.6   ALBUMIN 3.7   BILITOT 0.3   ALKPHOS 70   AST 55*   ALT 23   ANIONGAP 9   , CBC   Recent Labs   Lab 02/19/25  1154   WBC 5.57   HGB 14.4   HCT 43.4      , INR No results for input(s): "INR", "PROTIME" in the last 48 hours., Lipid Panel No results for input(s): "CHOL", "HDL", "LDLCALC", "TRIG", "CHOLHDL" in the last 48 hours., and Troponin   Recent Labs   Lab 02/19/25  1154   TROPONINIHS 5     Significant Imaging:   Exercise stress echo 1/31/25    Left Ventricle: The left ventricle is normal in size. Ventricular mass is normal. Normal wall thickness. Regional wall motion abnormalities present. See diagram for wall motion findings. There is low normal to slightly reduced  systolic function with a visually estimated ejection fraction of 50 - 55%. Ejection fraction is approximately 50%. There is normal diastolic function.    Right Ventricle: Normal right ventricular cavity size. Wall thickness is normal. Systolic function is normal.    Mitral Valve: There is mild bileaflet sclerosis. There is mild regurgitation.    Pulmonic Valve: There is mild regurgitation.    IVC/SVC: Normal venous pressure at 3 mmHg.    Baseline ECG: The Baseline ECG reveals sinus rhythm. The axis is normal. The ST segments are normal.    Stress ECG: There is 1.5 mm of downward-sloping ST segment depression in the inferolateral leads (II, III, aVF, V5 and V6) noted during " stress. There are no arrhythmias during stress. There is normal blood pressure response with stress.    ECG Conclusion: The ECG portion of the study is positive for ischemia.    Post-stress Echo: The left ventricle systolic function is mildly decreased with an EF of 43%. The post-stress echo showed wall motion abnormalities compared to baseline. Right ventricle systolic function is normal.    Post-stress Conclusion: The study is consistent with ischemia and consistent with scar and probably 3 vessel CAD.

## 2025-02-19 NOTE — SUBJECTIVE & OBJECTIVE
Past Medical History:   Diagnosis Date    Hyperlipidemia     Pyloric stenosis, congenital     s/p repair    Seizures        Past Surgical History:   Procedure Laterality Date    pyloric stenosis      TONSILLECTOMY         Review of patient's allergies indicates:   Allergen Reactions    Antihistamines - alkylamine Other (See Comments)     Heart race    Codeine Other (See Comments)     Heart race       No current facility-administered medications on file prior to encounter.     Current Outpatient Medications on File Prior to Encounter   Medication Sig    ascorbic acid (VITAMIN C) 1000 MG tablet Take 1,000 mg by mouth once daily.    cyanocobalamin (VITAMIN B-12) 100 MCG tablet Take 100 mcg by mouth once daily.    lamotrigine (LAMICTAL) 200 MG tablet Take 1 tablet by mouth  daily    lorazepam (ATIVAN) 0.5 MG tablet Take 1 tablet (0.5 mg total) by mouth 2 (two) times daily. Take 1/2 tablet (0.25mg) in am, take 1 tablet (0.5mg) in pm (Patient taking differently: Take 0.5 mg by mouth 2 (two) times daily.)     Family History       Problem Relation (Age of Onset)    Alcohol abuse Sister, Brother    Arthritis Mother    Cancer Mother, Father    Crohn's disease Son    Diabetes Son    Heart disease Sister    Hypertension Mother, Sister, Brother    Kidney disease Son    Obesity Sister, Brother          Tobacco Use    Smoking status: Former     Current packs/day: 0.00     Types: Cigarettes     Quit date:      Years since quittin.    Smokeless tobacco: Never   Substance and Sexual Activity    Alcohol use: No    Drug use: No    Sexual activity: Yes     Partners: Female     Review of Systems  Objective:     Vital Signs (Most Recent):  Temp: 98 °F (36.7 °C) (25 1051)  Pulse: 75 (25 1051)  Resp: 20 (25 1051)  BP: (!) 182/98 (25 1051)  SpO2: 100 % (25 1051) Vital Signs (24h Range):  Temp:  [98 °F (36.7 °C)] 98 °F (36.7 °C)  Pulse:  [75] 75  Resp:  [20] 20  SpO2:  [100 %] 100 %  BP: (182)/(98)  182/98     Weight: 66.7 kg (147 lb)  Body mass index is 22.35 kg/m².     Physical Exam  Constitutional:       General: He is not in acute distress.     Appearance: Normal appearance. He is not ill-appearing.   HENT:      Head: Normocephalic and atraumatic.      Mouth/Throat:      Mouth: Mucous membranes are moist.   Eyes:      Extraocular Movements: Extraocular movements intact.      Conjunctiva/sclera: Conjunctivae normal.   Cardiovascular:      Rate and Rhythm: Normal rate and regular rhythm.      Heart sounds: Normal heart sounds.   Pulmonary:      Effort: Pulmonary effort is normal. No respiratory distress.      Breath sounds: Normal breath sounds.   Abdominal:      General: Abdomen is flat. Bowel sounds are normal. There is no distension.      Palpations: Abdomen is soft.      Tenderness: There is no abdominal tenderness. There is no guarding.   Musculoskeletal:         General: No swelling.      Right lower leg: No edema.      Left lower leg: No edema.   Skin:     General: Skin is warm.   Neurological:      General: No focal deficit present.      Mental Status: He is alert and oriented to person, place, and time.   Psychiatric:         Mood and Affect: Mood normal.         Behavior: Behavior normal.                Significant Labs: All pertinent labs within the past 24 hours have been reviewed.    Significant Imaging: I have reviewed all pertinent imaging results/findings within the past 24 hours.

## 2025-02-19 NOTE — CONSULTS
[facility]  Cardiology  Consult Note    Patient Name: Ye Partida  MRN: [id number]  Admission Date: 2/19/2025  Hospital Length of Stay: 0 days  Code Status: No Order   Attending Provider: Boo Tsang MD   Consulting Provider: Edwardo De Jesus MD  Primary Care Physician: Cameron Tran MD  Principal Problem:Chest pain    Patient information was obtained from patient, spouse/SO, past medical records, and ER records.     Inpatient consult to Cardiology  Consult performed by: Edwardo De Jesus MD  Consult ordered by: Boo Tsang MD        Subjective:     Chief Complaint:  Chest pain, positive stress test     HPI:   69-year-old male with PMHx of HLD, seizures, and CAD on CT chest who presents with episodes of chest pain. Recently, he underwent exercise stress echo 1/31/25 as part of pre-op evaluation to be a kidney donor. Stress test was positive revealing: The study is consistent with ischemia and consistent with scar and probably 3 vessel CAD. He was unable to make a follow up cardiology appointment with Dr. Moseley 2/4/25 and was scheduled a month from now. He had not been having symptoms of chest pain prior to stress test. He had been exercising regularly without limitation but stopped after this positive study. Yesterday, he woke up with a feeling of a mild chest pressure to his anterior chest. It lasted for about 45 minutes. He was states it felt like a 1 lb weight on his chest. No associated dyspnea. He was had episodes of sharp chest pain to his left lateral chest the past 2 days that go away quickly. Symptoms not brought on by exertion. Currently chest pain free and hemodynamically stable. He does not have symptoms with walking or any exertion even though he has reduced his exertion recently. He was concerned about this chest discomfort and was instructed to go to the emergency department.     Past Medical History:   Diagnosis Date    Hyperlipidemia     Pyloric stenosis,  congenital     s/p repair    Seizures        Past Surgical History:   Procedure Laterality Date    pyloric stenosis      TONSILLECTOMY         Review of patient's allergies indicates:   Allergen Reactions    Antihistamines - alkylamine Other (See Comments)     Heart race    Codeine Other (See Comments)     Heart race       No current facility-administered medications on file prior to encounter.     Current Outpatient Medications on File Prior to Encounter   Medication Sig    ascorbic acid (VITAMIN C) 1000 MG tablet Take 1,000 mg by mouth once daily.    cyanocobalamin (VITAMIN B-12) 100 MCG tablet Take 100 mcg by mouth once daily.    lamotrigine (LAMICTAL) 200 MG tablet Take 1 tablet by mouth  daily    lorazepam (ATIVAN) 0.5 MG tablet Take 1 tablet (0.5 mg total) by mouth 2 (two) times daily. Take 1/2 tablet (0.25mg) in am, take 1 tablet (0.5mg) in pm (Patient taking differently: Take 0.5 mg by mouth 2 (two) times daily.)     Family History       Problem Relation (Age of Onset)    Alcohol abuse Sister, Brother    Arthritis Mother    Cancer Mother, Father    Crohn's disease Son    Diabetes Son    Heart disease Sister    Hypertension Mother, Sister, Brother    Kidney disease Son    Obesity Sister, Brother          Tobacco Use    Smoking status: Former     Current packs/day: 0.00     Types: Cigarettes     Quit date:      Years since quittin.    Smokeless tobacco: Never   Substance and Sexual Activity    Alcohol use: No    Drug use: No    Sexual activity: Yes     Partners: Female     Review of Systems   Constitutional: Negative for chills, diaphoresis and night sweats.   Cardiovascular:  Positive for chest pain. Negative for dyspnea on exertion, irregular heartbeat, leg swelling, near-syncope, palpitations and syncope.   Respiratory:  Negative for cough, shortness of breath and wheezing.    Skin:  Negative for color change and dry skin.   Musculoskeletal:  Negative for joint pain and joint swelling.    Gastrointestinal:  Negative for abdominal pain, diarrhea, nausea and vomiting.   Genitourinary:  Negative for dysuria.   Neurological:  Negative for dizziness, headaches, light-headedness and weakness.   All other systems reviewed and are negative.    Objective:     Vital Signs (Most Recent):  Temp: 98 °F (36.7 °C) (02/19/25 1051)  Pulse: 75 (02/19/25 1051)  Resp: 20 (02/19/25 1051)  BP: (!) 182/98 (02/19/25 1051)  SpO2: 100 % (02/19/25 1051) Vital Signs (24h Range):  Temp:  [98 °F (36.7 °C)] 98 °F (36.7 °C)  Pulse:  [75] 75  Resp:  [20] 20  SpO2:  [100 %] 100 %  BP: (182)/(98) 182/98     Weight: 66.7 kg (147 lb)  Body mass index is 22.35 kg/m².    SpO2: 100 %       No intake or output data in the 24 hours ending 02/19/25 1410    Lines/Drains/Airways       Peripheral Intravenous Line  Duration                  Peripheral IV - Single Lumen 02/19/25 1153 20 G Left Antecubital <1 day                     Physical Exam  Constitutional:       Appearance: He is well-developed.   HENT:      Head: Normocephalic and atraumatic.   Eyes:      Pupils: Pupils are equal, round, and reactive to light.   Neck:      Vascular: No JVD.   Cardiovascular:      Rate and Rhythm: Normal rate and regular rhythm. No extrasystoles are present.     Pulses: Intact distal pulses.           Radial pulses are 2+ on the right side and 2+ on the left side.        Femoral pulses are 2+ on the right side and 2+ on the left side.     Heart sounds: Normal heart sounds. No murmur heard.  Pulmonary:      Effort: Pulmonary effort is normal.      Breath sounds: Normal breath sounds. No wheezing or rales.   Abdominal:      General: Bowel sounds are normal. There is no distension.      Palpations: Abdomen is soft.      Tenderness: There is no abdominal tenderness.   Musculoskeletal:         General: Normal range of motion.      Cervical back: Normal range of motion.      Right lower leg: No edema.      Left lower leg: No edema.   Skin:     General: Skin is  "warm and dry.      Capillary Refill: Capillary refill takes less than 2 seconds.      Findings: No erythema or rash.   Neurological:      General: No focal deficit present.      Mental Status: He is alert and oriented to person, place, and time.   Psychiatric:         Thought Content: Thought content normal.         Judgment: Judgment normal.          Significant Labs: CMP   Recent Labs   Lab 02/19/25  1154      K 4.3      CO2 28   GLU 93   BUN 14   CREATININE 1.2   CALCIUM 8.8   PROT 6.6   ALBUMIN 3.7   BILITOT 0.3   ALKPHOS 70   AST 55*   ALT 23   ANIONGAP 9   , CBC   Recent Labs   Lab 02/19/25  1154   WBC 5.57   HGB 14.4   HCT 43.4      , INR No results for input(s): "INR", "PROTIME" in the last 48 hours., Lipid Panel No results for input(s): "CHOL", "HDL", "LDLCALC", "TRIG", "CHOLHDL" in the last 48 hours., and Troponin   Recent Labs   Lab 02/19/25  1154   TROPONINIHS 5     Significant Imaging:   Exercise stress echo 1/31/25    Left Ventricle: The left ventricle is normal in size. Ventricular mass is normal. Normal wall thickness. Regional wall motion abnormalities present. See diagram for wall motion findings. There is low normal to slightly reduced  systolic function with a visually estimated ejection fraction of 50 - 55%. Ejection fraction is approximately 50%. There is normal diastolic function.    Right Ventricle: Normal right ventricular cavity size. Wall thickness is normal. Systolic function is normal.    Mitral Valve: There is mild bileaflet sclerosis. There is mild regurgitation.    Pulmonic Valve: There is mild regurgitation.    IVC/SVC: Normal venous pressure at 3 mmHg.    Baseline ECG: The Baseline ECG reveals sinus rhythm. The axis is normal. The ST segments are normal.    Stress ECG: There is 1.5 mm of downward-sloping ST segment depression in the inferolateral leads (II, III, aVF, V5 and V6) noted during stress. There are no arrhythmias during stress. There is normal blood " pressure response with stress.    ECG Conclusion: The ECG portion of the study is positive for ischemia.    Post-stress Echo: The left ventricle systolic function is mildly decreased with an EF of 43%. The post-stress echo showed wall motion abnormalities compared to baseline. Right ventricle systolic function is normal.    Post-stress Conclusion: The study is consistent with ischemia and consistent with scar and probably 3 vessel CAD.    Assessment and Plan:     * Chest pain  PMHx of CAD on CT and positive stress echo who presents with chest pain. EKG negative for ischemic changes. Biomarkers have been unremarkable on initial evaluation.    Case discussed with Interventional Cardiology who agrees with plans below     would admit patient to [facility] medicine  NPO since 9am  Consented for LHC  prn sublingual nitroglycerin if has chest pain     - Plan is for LHC/angiogram +/- PCI.   - TTE: 60%  - Anti-platelet therapy: none  - Access: R radial  - Catheters: Haim  - Creatinine/CrCl: 1.2  - Hemoglobin: 14.4  - Allergies: no contrast allergies.   - Pre-op hydration: 3 cc/kg/hr x 1 hour  - Pre-op med: 50 mg Benadryl    All patient's questions were answered.  The risks, benefits and alternatives of the procedure were explained to the patient.   The risks of coronary angiography include but are not limited to: bleeding, infection, heart rhythm abnormalities, allergic reactions, kidney injury and potential need for dialysis, stroke and death.   Should stenting be indicated, the patient has agreed to dual anti-platelet therapy for 1-consecutive year with a drug-eluting stent and a minimum of 1-month with the use of a bare metal stent  Additionally, pt is aware that non-compliance is likely to result in stent clotting with heart attack, heart failure, and/or death  The risks of moderate sedation include hypotension, respiratory depression, arrhythmias, bronchospasm, and death.   Informed consent was obtained and the  patient  is agreeable to proceed with the procedure.      Thank you for your consult. I will follow-up with patient. Please contact us if you have any additional questions.    Edwardo De Jesus MD  Cardiology   [facility]

## 2025-02-19 NOTE — SUBJECTIVE & OBJECTIVE
Past Medical History:   Diagnosis Date    Hyperlipidemia     Pyloric stenosis, congenital     s/p repair    Seizures        Past Surgical History:   Procedure Laterality Date    pyloric stenosis      TONSILLECTOMY         Review of patient's allergies indicates:   Allergen Reactions    Antihistamines - alkylamine Other (See Comments)     Heart race    Codeine Other (See Comments)     Heart race       No current facility-administered medications on file prior to encounter.     Current Outpatient Medications on File Prior to Encounter   Medication Sig    ascorbic acid (VITAMIN C) 1000 MG tablet Take 1,000 mg by mouth once daily.    cyanocobalamin (VITAMIN B-12) 100 MCG tablet Take 100 mcg by mouth once daily.    lamotrigine (LAMICTAL) 200 MG tablet Take 1 tablet by mouth  daily    lorazepam (ATIVAN) 0.5 MG tablet Take 1 tablet (0.5 mg total) by mouth 2 (two) times daily. Take 1/2 tablet (0.25mg) in am, take 1 tablet (0.5mg) in pm (Patient taking differently: Take 0.5 mg by mouth 2 (two) times daily.)     Family History       Problem Relation (Age of Onset)    Alcohol abuse Sister, Brother    Arthritis Mother    Cancer Mother, Father    Crohn's disease Son    Diabetes Son    Heart disease Sister    Hypertension Mother, Sister, Brother    Kidney disease Son    Obesity Sister, Brother          Tobacco Use    Smoking status: Former     Current packs/day: 0.00     Types: Cigarettes     Quit date:      Years since quittin.    Smokeless tobacco: Never   Substance and Sexual Activity    Alcohol use: No    Drug use: No    Sexual activity: Yes     Partners: Female     Review of Systems   Constitutional: Negative for chills, diaphoresis and night sweats.   Cardiovascular:  Positive for chest pain. Negative for dyspnea on exertion, irregular heartbeat, leg swelling, near-syncope, palpitations and syncope.   Respiratory:  Negative for cough, shortness of breath and wheezing.    Skin:  Negative for color change and dry  skin.   Musculoskeletal:  Negative for joint pain and joint swelling.   Gastrointestinal:  Negative for abdominal pain, diarrhea, nausea and vomiting.   Genitourinary:  Negative for dysuria.   Neurological:  Negative for dizziness, headaches, light-headedness and weakness.   All other systems reviewed and are negative.    Objective:     Vital Signs (Most Recent):  Temp: 98 °F (36.7 °C) (02/19/25 1051)  Pulse: 75 (02/19/25 1051)  Resp: 20 (02/19/25 1051)  BP: (!) 182/98 (02/19/25 1051)  SpO2: 100 % (02/19/25 1051) Vital Signs (24h Range):  Temp:  [98 °F (36.7 °C)] 98 °F (36.7 °C)  Pulse:  [75] 75  Resp:  [20] 20  SpO2:  [100 %] 100 %  BP: (182)/(98) 182/98     Weight: 66.7 kg (147 lb)  Body mass index is 22.35 kg/m².    SpO2: 100 %       No intake or output data in the 24 hours ending 02/19/25 1410    Lines/Drains/Airways       Peripheral Intravenous Line  Duration                  Peripheral IV - Single Lumen 02/19/25 1153 20 G Left Antecubital <1 day                     Physical Exam  Constitutional:       Appearance: He is well-developed.   HENT:      Head: Normocephalic and atraumatic.   Eyes:      Pupils: Pupils are equal, round, and reactive to light.   Neck:      Vascular: No JVD.   Cardiovascular:      Rate and Rhythm: Normal rate and regular rhythm. No extrasystoles are present.     Pulses: Intact distal pulses.           Radial pulses are 2+ on the right side and 2+ on the left side.        Femoral pulses are 2+ on the right side and 2+ on the left side.     Heart sounds: Normal heart sounds. No murmur heard.  Pulmonary:      Effort: Pulmonary effort is normal.      Breath sounds: Normal breath sounds. No wheezing or rales.   Abdominal:      General: Bowel sounds are normal. There is no distension.      Palpations: Abdomen is soft.      Tenderness: There is no abdominal tenderness.   Musculoskeletal:         General: Normal range of motion.      Cervical back: Normal range of motion.      Right lower leg:  "No edema.      Left lower leg: No edema.   Skin:     General: Skin is warm and dry.      Capillary Refill: Capillary refill takes less than 2 seconds.      Findings: No erythema or rash.   Neurological:      General: No focal deficit present.      Mental Status: He is alert and oriented to person, place, and time.   Psychiatric:         Thought Content: Thought content normal.         Judgment: Judgment normal.          Significant Labs: CMP   Recent Labs   Lab 02/19/25  1154      K 4.3      CO2 28   GLU 93   BUN 14   CREATININE 1.2   CALCIUM 8.8   PROT 6.6   ALBUMIN 3.7   BILITOT 0.3   ALKPHOS 70   AST 55*   ALT 23   ANIONGAP 9   , CBC   Recent Labs   Lab 02/19/25  1154   WBC 5.57   HGB 14.4   HCT 43.4      , INR No results for input(s): "INR", "PROTIME" in the last 48 hours., Lipid Panel No results for input(s): "CHOL", "HDL", "LDLCALC", "TRIG", "CHOLHDL" in the last 48 hours., and Troponin   Recent Labs   Lab 02/19/25  1154   TROPONINIHS 5     Significant Imaging:   Exercise stress echo 1/31/25    Left Ventricle: The left ventricle is normal in size. Ventricular mass is normal. Normal wall thickness. Regional wall motion abnormalities present. See diagram for wall motion findings. There is low normal to slightly reduced  systolic function with a visually estimated ejection fraction of 50 - 55%. Ejection fraction is approximately 50%. There is normal diastolic function.    Right Ventricle: Normal right ventricular cavity size. Wall thickness is normal. Systolic function is normal.    Mitral Valve: There is mild bileaflet sclerosis. There is mild regurgitation.    Pulmonic Valve: There is mild regurgitation.    IVC/SVC: Normal venous pressure at 3 mmHg.    Baseline ECG: The Baseline ECG reveals sinus rhythm. The axis is normal. The ST segments are normal.    Stress ECG: There is 1.5 mm of downward-sloping ST segment depression in the inferolateral leads (II, III, aVF, V5 and V6) noted during " stress. There are no arrhythmias during stress. There is normal blood pressure response with stress.    ECG Conclusion: The ECG portion of the study is positive for ischemia.    Post-stress Echo: The left ventricle systolic function is mildly decreased with an EF of 43%. The post-stress echo showed wall motion abnormalities compared to baseline. Right ventricle systolic function is normal.    Post-stress Conclusion: The study is consistent with ischemia and consistent with scar and probably 3 vessel CAD.

## 2025-02-20 ENCOUNTER — ANESTHESIA EVENT (OUTPATIENT)
Dept: SURGERY | Facility: HOSPITAL | Age: 70
DRG: 234 | End: 2025-02-20
Payer: MEDICARE

## 2025-02-20 PROBLEM — I25.10 CAD (CORONARY ARTERY DISEASE): Status: ACTIVE | Noted: 2025-02-20

## 2025-02-20 LAB
ABO + RH BLD: NORMAL
ALBUMIN SERPL BCP-MCNC: 3.4 G/DL (ref 3.5–5.2)
ALP SERPL-CCNC: 66 U/L (ref 40–150)
ALT SERPL W/O P-5'-P-CCNC: 14 U/L (ref 10–44)
ANION GAP SERPL CALC-SCNC: 9 MMOL/L (ref 8–16)
APTT PPP: 40.3 SEC (ref 21–32)
APTT PPP: 40.3 SEC (ref 21–32)
APTT PPP: 48.4 SEC (ref 21–32)
ASCENDING AORTA: 3.77 CM
AST SERPL-CCNC: 29 U/L (ref 10–40)
AV AREA BY CONTINUOUS VTI: 2.9 CM2
AV INDEX (PROSTH): 0.85
AV LVOT MEAN GRADIENT: 2 MMHG
AV LVOT PEAK GRADIENT: 5 MMHG
AV MEAN GRADIENT: 3 MMHG
AV PEAK GRADIENT: 5 MMHG
AV VALVE AREA BY VELOCITY RATIO: 3.5 CM²
AV VALVE AREA: 2.9 CM2
AV VELOCITY RATIO: 1
BASOPHILS # BLD AUTO: 0.04 K/UL (ref 0–0.2)
BASOPHILS NFR BLD: 0.6 % (ref 0–1.9)
BILIRUB SERPL-MCNC: 0.6 MG/DL (ref 0.1–1)
BLD GP AB SCN CELLS X3 SERPL QL: NORMAL
BSA FOR ECHO PROCEDURE: 1.79 M2
BUN SERPL-MCNC: 13 MG/DL (ref 8–23)
CALCIUM SERPL-MCNC: 8.6 MG/DL (ref 8.7–10.5)
CHLORIDE SERPL-SCNC: 104 MMOL/L (ref 95–110)
CO2 SERPL-SCNC: 28 MMOL/L (ref 23–29)
CREAT SERPL-MCNC: 1.1 MG/DL (ref 0.5–1.4)
CV ECHO LV RWT: 0.37 CM
DIFFERENTIAL METHOD BLD: ABNORMAL
DOP CALC AO PEAK VEL: 1.1 M/S
DOP CALC AO VTI: 25 CM
DOP CALC LVOT AREA: 3.5 CM2
DOP CALC LVOT DIAMETER: 2.1 CM
DOP CALC LVOT PEAK VEL: 1.1 M/S
DOP CALC LVOT STROKE VOLUME: 73.4 CM3
DOP CALCLVOT PEAK VEL VTI: 21.2 CM
E WAVE DECELERATION TIME: 299 MS
E/A RATIO: 0.81
E/E' RATIO: 9 M/S
ECHO EF ESTIMATED: 58 %
ECHO LV POSTERIOR WALL: 0.9 CM (ref 0.6–1.1)
EOSINOPHIL # BLD AUTO: 0.4 K/UL (ref 0–0.5)
EOSINOPHIL NFR BLD: 6.6 % (ref 0–8)
ERYTHROCYTE [DISTWIDTH] IN BLOOD BY AUTOMATED COUNT: 15.3 % (ref 11.5–14.5)
EST. GFR  (NO RACE VARIABLE): >60 ML/MIN/1.73 M^2
FRACTIONAL SHORTENING: 30.6 % (ref 28–44)
GLOBAL LONGITUIDAL STRAIN: 14 %
GLUCOSE SERPL-MCNC: 83 MG/DL (ref 70–110)
HCT VFR BLD AUTO: 41.5 % (ref 40–54)
HGB BLD-MCNC: 13.4 G/DL (ref 14–18)
IMM GRANULOCYTES # BLD AUTO: 0.01 K/UL (ref 0–0.04)
IMM GRANULOCYTES NFR BLD AUTO: 0.2 % (ref 0–0.5)
INTERVENTRICULAR SEPTUM: 0.7 CM (ref 0.6–1.1)
IVC DIAMETER: 1.49 CM
LA MAJOR: 5 CM
LA MINOR: 5.4 CM
LA WIDTH: 3.8 CM
LEFT ATRIUM SIZE: 3.8 CM
LEFT ATRIUM VOLUME INDEX MOD: 30 ML/M2
LEFT ATRIUM VOLUME INDEX: 36 ML/M2
LEFT ATRIUM VOLUME MOD: 54 ML
LEFT ATRIUM VOLUME: 64 CM3
LEFT INTERNAL DIMENSION IN SYSTOLE: 3.4 CM (ref 2.1–4)
LEFT VENTRICLE DIASTOLIC VOLUME INDEX: 63.91 ML/M2
LEFT VENTRICLE DIASTOLIC VOLUME: 114.39 ML
LEFT VENTRICLE MASS INDEX: 73.3 G/M2
LEFT VENTRICLE SYSTOLIC VOLUME INDEX: 27 ML/M2
LEFT VENTRICLE SYSTOLIC VOLUME: 48.41 ML
LEFT VENTRICULAR INTERNAL DIMENSION IN DIASTOLE: 4.9 CM (ref 3.5–6)
LEFT VENTRICULAR MASS: 131.2 G
LV LATERAL E/E' RATIO: 7.7
LV SEPTAL E/E' RATIO: 10.8
LYMPHOCYTES # BLD AUTO: 1.6 K/UL (ref 1–4.8)
LYMPHOCYTES NFR BLD: 23.6 % (ref 18–48)
MAGNESIUM SERPL-MCNC: 1.9 MG/DL (ref 1.6–2.6)
MCH RBC QN AUTO: 29.8 PG (ref 27–31)
MCHC RBC AUTO-ENTMCNC: 32.3 G/DL (ref 32–36)
MCV RBC AUTO: 92 FL (ref 82–98)
MONOCYTES # BLD AUTO: 0.6 K/UL (ref 0.3–1)
MONOCYTES NFR BLD: 9.5 % (ref 4–15)
MV A" WAVE DURATION": 71.36 MS
MV PEAK A VEL: 0.67 M/S
MV PEAK E VEL: 0.54 M/S
NEUTROPHILS # BLD AUTO: 3.9 K/UL (ref 1.8–7.7)
NEUTROPHILS NFR BLD: 59.5 % (ref 38–73)
NRBC BLD-RTO: 0 /100 WBC
OHS CV RV/LV RATIO: 0.55 CM
OHS LV EJECTION FRACTION SIMPSONS BIPLANE MOD: 47 %
PHOSPHATE SERPL-MCNC: 2.9 MG/DL (ref 2.7–4.5)
PLATELET # BLD AUTO: 249 K/UL (ref 150–450)
PMV BLD AUTO: 9.8 FL (ref 9.2–12.9)
POCT GLUCOSE: 129 MG/DL (ref 70–110)
POCT GLUCOSE: 132 MG/DL (ref 70–110)
POCT GLUCOSE: 72 MG/DL (ref 70–110)
POTASSIUM SERPL-SCNC: 3.4 MMOL/L (ref 3.5–5.1)
PROT SERPL-MCNC: 5.6 G/DL (ref 6–8.4)
PULM VEIN A" WAVE DURATION": 71.36 MS
PULM VEIN S/D RATIO: 3.15
PULMONIC VEIN PEAK A VELOCITY: 0.1 M/S
PV PEAK D VEL: 0.26 M/S
PV PEAK S VEL: 0.82 M/S
RA MAJOR: 4.95 CM
RA PRESSURE ESTIMATED: 3 MMHG
RA WIDTH: 3.54 CM
RBC # BLD AUTO: 4.5 M/UL (ref 4.6–6.2)
RIGHT ATRIAL AREA: 15.5 CM2
RIGHT ATRIUM END SYSTOLIC VOLUME APICAL 4 CHAMBER INDEX BSA: 21.2 ML/M2
RIGHT ATRIUM VOLUME AREA LENGTH APICAL 4 CHAMBER: 38 ML
RIGHT VENTRICLE DIASTOLIC BASEL DIMENSION: 2.7 CM
RV TISSUE DOPPLER FREE WALL SYSTOLIC VELOCITY 1 (APICAL 4 CHAMBER VIEW): 12.72 CM/S
SINUS: 3.68 CM
SODIUM SERPL-SCNC: 141 MMOL/L (ref 136–145)
SPECIMEN OUTDATE: NORMAL
STJ: 3.33 CM
TDI LATERAL: 0.07 M/S
TDI SEPTAL: 0.05 M/S
TDI: 0.06 M/S
TRICUSPID ANNULAR PLANE SYSTOLIC EXCURSION: 2.57 CM
WBC # BLD AUTO: 6.62 K/UL (ref 3.9–12.7)
Z-SCORE OF LEFT VENTRICULAR DIMENSION IN END DIASTOLE: -0.1
Z-SCORE OF LEFT VENTRICULAR DIMENSION IN END SYSTOLE: 0.84

## 2025-02-20 PROCEDURE — 80053 COMPREHEN METABOLIC PANEL: CPT

## 2025-02-20 PROCEDURE — 99232 SBSQ HOSP IP/OBS MODERATE 35: CPT | Mod: ,,, | Performed by: INTERNAL MEDICINE

## 2025-02-20 PROCEDURE — 83735 ASSAY OF MAGNESIUM: CPT

## 2025-02-20 PROCEDURE — 25000003 PHARM REV CODE 250

## 2025-02-20 PROCEDURE — 85730 THROMBOPLASTIN TIME PARTIAL: CPT | Mod: 91 | Performed by: STUDENT IN AN ORGANIZED HEALTH CARE EDUCATION/TRAINING PROGRAM

## 2025-02-20 PROCEDURE — 84100 ASSAY OF PHOSPHORUS: CPT

## 2025-02-20 PROCEDURE — 36415 COLL VENOUS BLD VENIPUNCTURE: CPT | Performed by: NURSE PRACTITIONER

## 2025-02-20 PROCEDURE — 20600001 HC STEP DOWN PRIVATE ROOM

## 2025-02-20 PROCEDURE — 86901 BLOOD TYPING SEROLOGIC RH(D): CPT | Performed by: NURSE PRACTITIONER

## 2025-02-20 PROCEDURE — 36415 COLL VENOUS BLD VENIPUNCTURE: CPT | Performed by: STUDENT IN AN ORGANIZED HEALTH CARE EDUCATION/TRAINING PROGRAM

## 2025-02-20 PROCEDURE — 85025 COMPLETE CBC W/AUTO DIFF WBC: CPT

## 2025-02-20 PROCEDURE — 99233 SBSQ HOSP IP/OBS HIGH 50: CPT | Mod: ,,, | Performed by: THORACIC SURGERY (CARDIOTHORACIC VASCULAR SURGERY)

## 2025-02-20 PROCEDURE — 25000003 PHARM REV CODE 250: Performed by: STUDENT IN AN ORGANIZED HEALTH CARE EDUCATION/TRAINING PROGRAM

## 2025-02-20 RX ORDER — POTASSIUM CHLORIDE 20 MEQ/1
40 TABLET, EXTENDED RELEASE ORAL ONCE
Status: COMPLETED | OUTPATIENT
Start: 2025-02-20 | End: 2025-02-20

## 2025-02-20 RX ORDER — NITROGLYCERIN 0.4 MG/1
0.4 TABLET SUBLINGUAL EVERY 5 MIN PRN
Status: DISCONTINUED | OUTPATIENT
Start: 2025-02-20 | End: 2025-02-21

## 2025-02-20 RX ADMIN — LAMOTRIGINE 200 MG: 100 TABLET ORAL at 08:02

## 2025-02-20 RX ADMIN — LORAZEPAM 0.5 MG: 0.5 TABLET ORAL at 08:02

## 2025-02-20 RX ADMIN — LORAZEPAM 0.5 MG: 0.5 TABLET ORAL at 10:02

## 2025-02-20 RX ADMIN — ATORVASTATIN CALCIUM 40 MG: 40 TABLET, FILM COATED ORAL at 08:02

## 2025-02-20 RX ADMIN — POTASSIUM CHLORIDE 40 MEQ: 1500 TABLET, EXTENDED RELEASE ORAL at 08:02

## 2025-02-20 RX ADMIN — ASPIRIN 81 MG CHEWABLE TABLET 81 MG: 81 TABLET CHEWABLE at 08:02

## 2025-02-20 NOTE — HOSPITAL COURSE
Patient CAD and seizure disorder who pw intermittent chest pain after outpatient stress echo done for preop evaluation revealed lateral ischemic changes. [facility] with 3 vessel disease now awaiting CABG.

## 2025-02-20 NOTE — HPI
This is a 69-year-old male who presented with positive stress test and was found to have coronary artery disease.  Patient has started to have unstable angina and was admitted from the cath lab.  Extensive discussions were carried out and after understanding risks and benefits patient wanted to proceed with surgical revascularization.  Patient was started on a heparin drip and consented for surgery.

## 2025-02-20 NOTE — ASSESSMENT & PLAN NOTE
Patient with known CAD, which is uncontrolled Will continue ASA and Statin and monitor for S/Sx of angina/ACS. Continue to monitor on telemetry.

## 2025-02-20 NOTE — ASSESSMENT & PLAN NOTE
68 yo M CAD, HTN, seizure disorder, and peripheral neuropathy who presents to [facility]  after having chest pain for 3 days. Stress test done for a preop evaluation to be a kidney donor was positive. Stress echo revealed EF 50-55%, EKG revealed ST seg depression in inferolateral leads (II, III, aVF, V5 and V6), post stress EF reduced to 43%. Patient states that 5 days ago he began to notice sharp left sided pain that was intermittent and occurred in the morning, then the past 2 days including this morning, noticed anterior chest heaviness that lasted 45 minutes, which self resolved without nitroglycerin or pain medication. Pain was occurring at rest in the morning. Pt currently denies chest pain, sob, cough, sore throat, abdominal pain, n/v/d, constipation, fever, chills, headache, numbness or tingling or weakness of the extremities In the ED, patient AF, HR 75, RR 20, /92. Initial labs CBC, CMP unremarkable, BNP 78, and negative troponin. EKG showed NSR Qtc 385. CXR without acute cardiopulmonary process.     Interventional cardiology consulted, had LHC showing 3 vessel disease. Patient evaluated by CTS and is scheduled for CABG    - CABG tomorrow (02/21)  - NPO at midnight  - Continue asa 81 and statin  - continue telemetry  - maintain Mg >2, K>4, P >3

## 2025-02-20 NOTE — ASSESSMENT & PLAN NOTE
LDL 79 on lipid panel 1/29/25. Current home meds include atorvastatin 10 mg which he notes he requested to start for secondary prevention.     -continue atorvastatin 40 mg daily

## 2025-02-20 NOTE — SUBJECTIVE & OBJECTIVE
No current facility-administered medications on file prior to encounter.     Current Outpatient Medications on File Prior to Encounter   Medication Sig    ascorbic acid (VITAMIN C) 1000 MG tablet Take 1,000 mg by mouth once daily.    cyanocobalamin (VITAMIN B-12) 100 MCG tablet Take 100 mcg by mouth once daily.    lamotrigine (LAMICTAL) 200 MG tablet Take 1 tablet by mouth  daily    lorazepam (ATIVAN) 0.5 MG tablet Take 1 tablet (0.5 mg total) by mouth 2 (two) times daily. Take 1/2 tablet (0.25mg) in am, take 1 tablet (0.5mg) in pm (Patient taking differently: Take 0.5 mg by mouth 2 (two) times daily.)       Review of patient's allergies indicates:   Allergen Reactions    Antihistamines - alkylamine Other (See Comments)     Heart race    Codeine Other (See Comments)     Heart race       Past Medical History:   Diagnosis Date    Hyperlipidemia     Pyloric stenosis, congenital     s/p repair    Seizures      Past Surgical History:   Procedure Laterality Date    CORONARY ANGIOGRAPHY Right 2025    Procedure: ANGIOGRAM, CORONARY ARTERY;  Surgeon: Ari Latham III, MD;  Location: [facility] CATH LAB;  Service: Cardiology;  Laterality: Right;    pyloric stenosis      TONSILLECTOMY       Family History       Problem Relation (Age of Onset)    Alcohol abuse Sister, Brother    Arthritis Mother    Cancer Mother, Father    Crohn's disease Son    Diabetes Son    Heart disease Sister    Hypertension Mother, Sister, Brother    Kidney disease Son    Obesity Sister, Brother          Tobacco Use    Smoking status: Former     Current packs/day: 0.00     Types: Cigarettes     Quit date:      Years since quittin.1    Smokeless tobacco: Never   Substance and Sexual Activity    Alcohol use: No    Drug use: No    Sexual activity: Yes     Partners: Female     Review of Systems   Constitutional:  Negative for activity change, appetite change, fatigue and fever.   HENT:  Negative for nosebleeds.    Respiratory:  Negative for cough  and shortness of breath.    Cardiovascular:  Negative for chest pain, palpitations and leg swelling.   Gastrointestinal:  Negative for abdominal distention, abdominal pain and nausea.   Genitourinary:  Negative for frequency.   Musculoskeletal:  Negative for arthralgias and myalgias.   Skin:  Negative for rash.   Neurological:  Negative for dizziness and numbness.   Hematological:  Does not bruise/bleed easily.     Objective:     Vital Signs (Most Recent):  Temp: 98.3 °F (36.8 °C) (02/20/25 0715)  Pulse: 61 (02/20/25 1017)  Resp: 18 (02/20/25 0715)  BP: (!) 141/76 (02/20/25 0715)  SpO2: 98 % (02/20/25 0715) Vital Signs (24h Range):  Temp:  [97.3 °F (36.3 °C)-99.1 °F (37.3 °C)] 98.3 °F (36.8 °C)  Pulse:  [53-85] 61  Resp:  [17-18] 18  SpO2:  [93 %-100 %] 98 %  BP: (133-179)/(74-91) 141/76     Weight: 66.7 kg (147 lb)  Body mass index is 22.35 kg/m².    SpO2: 98 %        Intake/Output - Last 3 Shifts         02/18 0700  02/19 0659 02/19 0700 02/20 0659 02/20 0700 02/21 0659    P.O.  360     Total Intake(mL/kg)  360 (5.4)     Urine (mL/kg/hr)  700     Total Output  700     Net  -340                     Lines/Drains/Airways       Peripheral Intravenous Line  Duration                  Peripheral IV - Single Lumen 02/19/25 1153 20 G Left Antecubital 1 day         Peripheral IV - Single Lumen 02/19/25 1542 20 G Left;Posterior Forearm <1 day                          Physical Exam  HENT:      Head: Normocephalic and atraumatic.   Eyes:      Extraocular Movements: Extraocular movements intact.   Cardiovascular:      Rate and Rhythm: Normal rate and regular rhythm.   Pulmonary:      Effort: Pulmonary effort is normal.   Abdominal:      General: Abdomen is flat.      Palpations: Abdomen is soft.   Musculoskeletal:         General: Normal range of motion.      Cervical back: Normal range of motion.   Skin:     General: Skin is warm and dry.      Capillary Refill: Capillary refill takes less than 2 seconds.   Neurological:       General: No focal deficit present.            Significant Labs:  BMP:   Recent Labs   Lab 02/20/25  0541   GLU 83      K 3.4*      CO2 28   BUN 13   CREATININE 1.1   CALCIUM 8.6*   MG 1.9     CBC:   Recent Labs   Lab 02/20/25  0541   WBC 6.62   RBC 4.50*   HGB 13.4*   HCT 41.5      MCV 92   MCH 29.8   MCHC 32.3     CMP:   Recent Labs   Lab 02/20/25  0541   GLU 83   CALCIUM 8.6*   ALBUMIN 3.4*   PROT 5.6*      K 3.4*   CO2 28      BUN 13   CREATININE 1.1   ALKPHOS 66   ALT 14   AST 29   BILITOT 0.6     Coagulation:   Recent Labs   Lab 02/20/25  0541   APTT 48.4*       Significant Diagnostics:  ECHO:    Left Ventricle: The left ventricle is normal in size. Ventricular mass is normal. Normal wall thickness. Regional wall motion abnormalities present. See diagram for wall motion findings. There is low normal to slightly reduced  systolic function with a visually estimated ejection fraction of 50 - 55%. Ejection fraction is approximately 50%. There is normal diastolic function.    Right Ventricle: Normal right ventricular cavity size. Wall thickness is normal. Systolic function is normal.    Mitral Valve: There is mild bileaflet sclerosis. There is mild regurgitation.    Pulmonic Valve: There is mild regurgitation.    IVC/SVC: Normal venous pressure at 3 mmHg.    Baseline ECG: The Baseline ECG reveals sinus rhythm. The axis is normal. The ST segments are normal.    Stress ECG: There is 1.5 mm of downward-sloping ST segment depression in the inferolateral leads (II, III, aVF, V5 and V6) noted during stress. There are no arrhythmias during stress. There is normal blood pressure response with stress.    ECG Conclusion: The ECG portion of the study is positive for ischemia.    Post-stress Echo: The left ventricle systolic function is mildly decreased with an EF of 43%. The post-stress echo showed wall motion abnormalities compared to baseline. Right ventricle systolic function is normal.     Post-stress Conclusion: The study is consistent with ischemia and consistent with scar and probably 3 vessel CAD.    Carotids:  Impression:  1-39% calculated carotid bifurcation stenosis bilaterally.  By grayscale appearance, stenosis at the right carotid bifurcation is likely at the upper end of this range.    Ct Chest:  1. No acute intrathoracic abnormality.  2. Coronary artery calcific atherosclerosis.  3. Few bilateral pulmonary micro nodules.  For multiple solid nodules all <6 mm, Fleischner Society 2017 guidelines recommend no routine follow up for a low risk patient, or optional follow up with non-contrast chest CT at 12 months after discovery in a high risk patient.  4. Additional findings as above.    LHC:      The Prox RCA lesion was 99% stenosed.    The Mid Cx lesion was 90% stenosed.    The Prox LAD to Mid LAD lesion was 80% stenosed.    There was three vessel coronary artery disease.  I have reviewed and interpreted all pertinent imaging results/findings within the past 24 hours.

## 2025-02-20 NOTE — PROGRESS NOTES
[facility] - Cardiology [facility] Medicine  Progress Note    Patient Name: Ye Partida  MRN: [id number]  Patient Class: OP- Observation   Admission Date: 2/19/2025  Length of Stay: 0 days  Attending Physician: King Spencer, *  Primary Care Provider: Cameron Tran MD        Subjective     Principal Problem:Chest pain        HPI:  Mr. Ye Partida is a 69 year old male with a PMHx of CAD, HTN, seizure disorder, and peripheral neuropathy who [place] [facility] [place]  [place] having chest pain for 3 days. Stress test done for a preop evaluation to be a kidney donor was positive. Stress echo revealed EF 50-55%, EKG revealed ST seg depression in inferolateral leads (II, III, aVF, V5 and V6), post stress EF reduced to 43%. Patient states that 5 days ago he began to notice sharp left sided pain that was intermittent and occurred in the morning, then the past 2 days including this morning, noticed anterior chest heaviness that lasted 45 minutes, which self resolved without nitroglycerin or pain medication. Pain was occurring at rest in the morning.     Pt denies chest pain, sob, cough, sore throat, abdominal pain, n/v/d, constipation, fever, chills, headache, numbness or tingling or weakness of the extremities     ED Course:  In the ED, patient AF, HR 75, RR 20, /92. Initial labs CBC, CMP unremarkable, BNP 78, and negative troponin. EKG showed NSR Qtc 385. CXR without acute cardiopulmonary process. Interventional cardiology consulted, planning for LHC +/- PCI afternoon of admission. Admitted to hospital medicine for further evaluation     Overview/Hospital Course:  Patient CAD and seizure disorder who pw intermittent chest pain after outpatient stress echo done for preop evaluation revealed lateral ischemic changes. [facility] with 3 vessel disease now being evaluated for CABG.    Interval History: Patient has 3 vessel disease seen on heart cath and is scheduled for CABG tomorrow 02/21.    Review of Systems    Constitutional:  Negative for chills, fatigue and fever.   Respiratory:  Negative for chest tightness and shortness of breath.    Cardiovascular:  Negative for chest pain, palpitations and leg swelling.   Gastrointestinal:  Negative for abdominal pain, nausea and vomiting.   Neurological:  Negative for dizziness and light-headedness.     Objective:     Vital Signs (Most Recent):  Temp: 98.3 °F (36.8 °C) (02/20/25 0715)  Pulse: 61 (02/20/25 1017)  Resp: 18 (02/20/25 0715)  BP: (!) 141/76 (02/20/25 0715)  SpO2: 98 % (02/20/25 0715) Vital Signs (24h Range):  Temp:  [97.3 °F (36.3 °C)-99.1 °F (37.3 °C)] 98.3 °F (36.8 °C)  Pulse:  [53-85] 61  Resp:  [17-18] 18  SpO2:  [93 %-100 %] 98 %  BP: (133-179)/(74-91) 141/76     Weight: 66.7 kg (147 lb)  Body mass index is 22.35 kg/m².    Intake/Output Summary (Last 24 hours) at 2/20/2025 1322  Last data filed at 2/20/2025 0500  Gross per 24 hour   Intake 360 ml   Output 700 ml   Net -340 ml         Physical Exam  Constitutional:       General: He is not in acute distress.     Appearance: Normal appearance. He is not ill-appearing.   HENT:      Head: Normocephalic and atraumatic.      Mouth/Throat:      Mouth: Mucous membranes are moist.   Eyes:      Extraocular Movements: Extraocular movements intact.      Conjunctiva/sclera: Conjunctivae normal.   Cardiovascular:      Rate and Rhythm: Normal rate and regular rhythm.      Heart sounds: Normal heart sounds.   Pulmonary:      Effort: Pulmonary effort is normal. No respiratory distress.      Breath sounds: Normal breath sounds.   Abdominal:      General: Abdomen is flat. Bowel sounds are normal. There is no distension.      Palpations: Abdomen is soft.      Tenderness: There is no abdominal tenderness. There is no guarding.   Musculoskeletal:         General: No swelling.      Right lower leg: No edema.      Left lower leg: No edema.   Skin:     General: Skin is warm.   Neurological:      General: No focal deficit present.       Mental Status: He is alert and oriented to person, place, and time.   Psychiatric:         Mood and Affect: Mood normal.         Behavior: Behavior normal.             Significant Labs: All pertinent labs within the past 24 hours have been reviewed.    Significant Imaging: I have reviewed all pertinent imaging results/findings within the past 24 hours.    Assessment and Plan     * Chest pain  70 yo M CAD, HTN, seizure disorder, and peripheral neuropathy who [place] [facility] [place] pain for 3 days. Stress test done for a preop evaluation to be a kidney donor was positive. Stress echo revealed EF 50-55%, EKG revealed ST seg depression in inferolateral leads (II, III, aVF, V5 and V6), post stress EF reduced to 43%. Patient states that 5 days ago he began to notice sharp left sided pain that was intermittent and occurred in the morning, then the past 2 days including this morning, noticed anterior chest heaviness that lasted 45 minutes, which self resolved without nitroglycerin or pain medication. Pain was occurring at rest in the morning. Pt currently denies chest pain, sob, cough, sore throat, abdominal pain, n/v/d, constipation, fever, chills, headache, numbness or tingling or weakness of the extremities In the ED, patient AF, HR 75, RR 20, /92. Initial labs CBC, CMP unremarkable, BNP 78, and negative troponin. EKG showed NSR Qtc 385. CXR without acute cardiopulmonary process.     Interventional cardiology consulted, had LHC showing 3 vessel disease. Patient evaluated by CTS and is scheduled for CABG    - CABG tomorrow (02/21)  - NPO at midnight  - Continue asa 81 and statin  - continue telemetry  - maintain Mg >2, K>4, P >3     CAD (coronary artery disease)  Patient with known CAD, which is uncontrolled Will continue ASA and Statin and monitor for S/Sx of angina/ACS. Continue to monitor on telemetry.     Primary hypertension  Patient's blood pressure range in the last 24 hours was: BP  Min: 133/74   Max: 179/84.The patient's inpatient anti-hypertensive regimen is listed below:    Plan  - BP is controlled, no changes needed to their regimen      Mixed hyperlipidemia  -Started on statin      Generalized convulsive epilepsy with intractable epilepsy  - resumed home lamotridine 200mg QD   - resumed home ativan 0.5 mg BID         VTE Risk Mitigation (From admission, onward)           Ordered     heparin 25,000 units in dextrose 5% (100 units/ml) IV bolus from bag LOW INTENSITY nomogram - [facility]  As needed (PRN)        Question:  Heparin Infusion Adjustment (DO NOT MODIFY ANSWER)  Answer:  [email or web address]\Images\Pharmacy\HeparinInfusions\heparin LOW INTENSITY nomogram for [facility] [id number]    02/19/25 1701     heparin 25,000 units in dextrose 5% (100 units/ml) IV bolus from bag LOW INTENSITY nomogram - [facility]  As needed (PRN)        Question:  Heparin Infusion Adjustment (DO NOT MODIFY ANSWER)  Answer:  [email or web address]\Images\Pharmacy\HeparinInfusions\heparin LOW INTENSITY nomogram for [facility] [id number]    02/19/25 1701     heparin 25,000 units in dextrose 5% 250 mL (100 units/mL) infusion LOW INTENSITY nomogram - [facility]  Continuous        Question:  Begin at (units/kg/hr)  Answer:  12    02/19/25 1701     IP VTE HIGH RISK PATIENT  Once         02/19/25 1430     Place sequential compression device  Until discontinued         02/19/25 1430                    Discharge Planning   KIRTI: 2/26/2025     Code Status: Full Code   Medical Readiness for Discharge Date:                            Jono Cotto DO  Department of Hospital Medicine   [facility] - Cardiology Stepdown

## 2025-02-20 NOTE — ASSESSMENT & PLAN NOTE
PMHx of CAD on CT and positive stress echo who presented with chest pain. EKG negative for ischemic changes. Biomarkers have been unremarkable on initial evaluation.    Case was discussed with Interventional Cardiology upon admission, patient now s/p LHC/coronary angiogram with findings as below.     Cardiac catheterization:  Prox LAD to Mid LAD lesion was 80% stenosed.   Mid LAD lesion was 50% stenosed.  Mid left Cx lesion was 90% stenosed.  Prox RCA lesion was 99% stenosed.     PLAN:  -continue aspirin 81 mg daily indefinitely  -continue high intensity statin therapy (LDL goal < 70)  -risk factor reduction (BP < 130/80 mmHg, glycemic control, etc)  -CT surgery was consulted for 3 vessel CAD, plans for surgical revascularization 2/21  -prn sublingual nitroglycerin if has chest pain  -consider addition of amlodipine 5 mg daily for goal BP < 130/80 mmHg   -hold addition of medications such as plavix, beta blockers, ACE inhibitors/ARBs while anticipating surgical intervention  -patient to follow up with his outpatient cardiologist (dr tobin) upon hospital discharge

## 2025-02-20 NOTE — ANESTHESIA PREPROCEDURE EVALUATION
[facility]  Anesthesia Pre-Operative Evaluation         Patient Name: Ye Partida  YOB: 1955  MRN: [id number]    SUBJECTIVE:     Pre-operative evaluation for Procedure(s) (LRB):  CORONARY ARTERY BYPASS GRAFT (CABG) (N/A)  HARVEST-VEIN-ENDOVASCULAR (N/A)     02/20/2025    Ye Partida is a 69 y.o. 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 He had a positive stress test which was done for preoperative testing prior to being a kidney donor. [facility] discovered 3 vessel disease. Proximal RCA lesion with 99% stenosis, mid Cx lesion with 90% stenosis, and proximal LAD to mid LAD with 80% stenosis.    Admitted to the floor with chest pain.    Patient now presents for the above procedure(s).    Level of Care: floor  Hemodynamics: stable  Oxygenation:  room air  LDAs: 20g PIV x 2       Echo Summary  Results for orders placed during the hospital encounter of 02/19/25    Echo    Interpretation Summary    Left Ventricle: The left ventricle is normal in size. Ventricular mass is normal. Normal wall thickness. Regional wall motion abnormalities present in RCA and LCX distribution. See diagram for wall motion findings. There is mildly reduced systolic function with a visually estimated ejection fraction of 40 - 50%. Quantitated ejection fraction is 47%. Global longitudinal strain is -14.0%. There is normal diastolic function.    Right Ventricle: Normal right ventricular cavity size. Wall thickness is normal. Systolic function is normal.    Left Atrium: Left atrium is mildly dilated. The left atrium volume index is 36 mL/m2.    Mitral Valve: There is mild regurgitation with a centrally directed jet.    Aorta: Aortic root is normal in size measuring 3.68 cm. Ascending aorta is mildly dilated measuring 3.77 cm.    IVC/SVC: Normal venous pressure at 3 mmHg.       Prev airway: None documented.    LDA:        Peripheral IV - Single Lumen  02/19/25 1153 20 G Left Antecubital (Active)   Site Assessment Clean;Dry;Intact 02/20/25 0800   Line Securement Device Secured with sutureless device 02/20/25 0800   Extremity Assessment Distal to IV No abnormal discoloration;No redness;No swelling;No warmth 02/20/25 0800   Line Status Saline locked 02/20/25 0800   Dressing Status Clean;Dry;Intact 02/20/25 0800   Dressing Intervention Integrity maintained 02/20/25 0800   Dressing Change Due 02/23/25 02/20/25 0800   Site Change Due 02/23/25 02/20/25 0800   Number of days: 1            Peripheral IV - Single Lumen 02/19/25 1542 20 G Left;Posterior Forearm (Active)   Site Assessment Clean;Dry;Intact 02/20/25 1200   Line Securement Device Secured with sutureless device 02/20/25 0800   Extremity Assessment Distal to IV No abnormal discoloration;No warmth;No redness;No swelling 02/20/25 0800   Line Status Infusing 02/20/25 1200   Dressing Status Intact;Dry;Clean 02/20/25 1200   Dressing Intervention Integrity maintained 02/20/25 1200   Number of days: 0       Drips:    heparin (porcine) in D5W  0-40 Units/kg/hr Intravenous Continuous 8 mL/hr at 02/20/25 1400 12 Units/kg/hr at 02/20/25 1400       Problem List[1]    Review of patient's allergies indicates:   Allergen Reactions    Antihistamines - alkylamine Other (See Comments)     Heart race    Codeine Other (See Comments)     Heart race       Current Inpatient Medications:   aspirin  81 mg Oral Daily    atorvastatin  40 mg Oral Daily    lamoTRIgine  200 mg Oral Daily    LORazepam  0.5 mg Oral BID       Medications Ordered Prior to Encounter[2]    Past Surgical History:   Procedure Laterality Date    CORONARY ANGIOGRAPHY Right 2/19/2025    Procedure: ANGIOGRAM, CORONARY ARTERY;  Surgeon: Ari Latham III, MD;  Location: [facility] CATH LAB;  Service: Cardiology;  Laterality: Right;    pyloric stenosis      TONSILLECTOMY         Social History:  Tobacco Use: Medium Risk (2/19/2025)    Patient History     Smoking Tobacco Use:  Former     Smokeless Tobacco Use: Never     Passive Exposure: Not on file      Alcohol Use: Not At Risk (1/24/2025)    AUDIT-C     Frequency of Alcohol Consumption: Never     Average Number of Drinks: Patient does not drink     Frequency of Binge Drinking: Never        OBJECTIVE:     Vital Signs Range (Last 24H):  Temp:  [36.3 °C (97.3 °F)-37.3 °C (99.1 °F)]   Pulse:  [53-85]   Resp:  [17-18]   BP: (133-179)/(74-91)   SpO2:  [93 %-100 %]       Significant Labs:  Lab Results   Component Value Date    WBC 6.62 02/20/2025    HGB 13.4 (L) 02/20/2025    HCT 41.5 02/20/2025     02/20/2025    CHOL 169 01/29/2025    TRIG 100 01/29/2025    HDL 70 01/29/2025    ALT 14 02/20/2025    AST 29 02/20/2025     02/20/2025    K 3.4 (L) 02/20/2025     02/20/2025    CREATININE 1.1 02/20/2025    BUN 13 02/20/2025    CO2 28 02/20/2025    PSA 0.45 01/29/2025    INR 1.0 01/29/2025    HGBA1C 5.2 01/29/2025       Diagnostic Studies: No relevant studies.    EKG:   Results for orders placed or performed during the hospital encounter of 02/19/25   EKG 12-lead    Collection Time: 02/19/25 10:57 AM   Result Value Ref Range    QRS Duration 84 ms    [facility] QTC Calculation 385 ms    Narrative    Test Reason : R07.89,    Vent. Rate :  66 BPM     Atrial Rate :  66 BPM     P-R Int : 142 ms          QRS Dur :  84 ms      QT Int : 368 ms       P-R-T Axes :  45  62  47 degrees    QTcB Int : 385 ms    Normal sinus rhythm  Possible Left atrial enlargement  Borderline Abnormal ECG  When compared with ECG of 31-Jan-2025 08:11,  No significant change was found  Confirmed by Joshua Fong ([id number]) on 2/19/2025 2:37:18 PM    Referred By:            Confirmed By: Joshua Fong       2D ECHO:  TTE:  Results for orders placed or performed during the hospital encounter of 02/19/25   Echo Saline Bubble? No; Ultrasound enhancing contrast? No *Canceled*    Narrative    The following orders were created for panel order Echo Saline Bubble? No; Ultrasound enhancing  contrast? No.  Procedure                               Abnormality         Status                     ---------                               -----------         ------                       Please view results for these tests on the individual orders.       BONG:  No results found for this or any previous visit.    ASSESSMENT/PLAN:           Pre-op Assessment    I have reviewed the Patient Summary Reports.     I have reviewed the Nursing Notes. I have reviewed the NPO Status.   I have reviewed the Medications.     Review of Systems  Anesthesia Hx:             Denies Family Hx of Anesthesia complications.    Denies Personal Hx of Anesthesia complications.                    Hematology/Oncology:  Hematology Normal   Oncology Normal                                   Cardiovascular:     Hypertension   CAD       CHF                                   Pulmonary:  Pulmonary Normal                       Renal/:  Renal/ Normal                 Hepatic/GI:  Hepatic/GI Normal                    Musculoskeletal:  Musculoskeletal Normal                Neurological:    Neuromuscular Disease,   Seizures                                Endocrine:  Endocrine Normal            Dermatological:  Skin Normal    Psych:  Psychiatric Normal                    Physical Exam  General: Well nourished, Cooperative, Alert and Oriented    Airway:  Mallampati: II   Mouth Opening: Normal  TM Distance: Normal  Tongue: Normal  Neck ROM: Normal ROM    Dental:  Intact        Anesthesia Plan  Type of Anesthesia, risks & benefits discussed:    Anesthesia Type: Gen ETT  Intra-op Monitoring Plan: Standard ASA Monitors, Art Line, Central Line, BONG and CO  Post Op Pain Control Plan: multimodal analgesia, IV/PO Opioids PRN and peripheral nerve block  Induction:  IV  Airway Plan: Direct and Video, Post-Induction  Informed Consent: Informed consent signed with the Patient and all parties understand the risks and agree with anesthesia plan.  All questions  answered.   ASA Score: 4  Day of Surgery Review of History & Physical: H&P Update referred to the surgeon/provider.    Ready For Surgery From Anesthesia Perspective.     .           [1]   Patient Active Problem List  Diagnosis    Generalized convulsive epilepsy with intractable epilepsy    Osteopenia    Peripheral neuropathy    Drug-induced osteoporosis    Other and unspecified anticonvulsants causing adverse effect in therapeutic use    Mixed hyperlipidemia    Vitamin B2 deficiency    Vitamin B12 deficiency    Vitamin B1 deficiency    Willing to be kidney donor    Chest pain    Primary hypertension    CAD (coronary artery disease)   [2]   No current facility-administered medications on file prior to encounter.     Current Outpatient Medications on File Prior to Encounter   Medication Sig Dispense Refill    ascorbic acid (VITAMIN C) 1000 MG tablet Take 1,000 mg by mouth once daily.      cyanocobalamin (VITAMIN B-12) 100 MCG tablet Take 100 mcg by mouth once daily.      lamotrigine (LAMICTAL) 200 MG tablet Take 1 tablet by mouth  daily 30 tablet 5    lorazepam (ATIVAN) 0.5 MG tablet Take 1 tablet (0.5 mg total) by mouth 2 (two) times daily. Take 1/2 tablet (0.25mg) in am, take 1 tablet (0.5mg) in pm (Patient taking differently: Take 0.5 mg by mouth 2 (two) times daily.) 5 tablet 0

## 2025-02-20 NOTE — ASSESSMENT & PLAN NOTE
Mr. Ye Partida is a 69 year old male with a PMHx of CAD, HTN, seizure disorder, and peripheral neuropathy who presents to [facility] after having chest pain for 3 days. Stress test done for a preop evaluation to be a kidney donor was positive. Stress echo revealed EF 50-55%, EKG revealed ST seg depression in inferolateral leads (II, III, aVF, V5 and V6), post stress EF reduced to 43%.    During his admission, he underwent a cardiac evaluation which demonstrated multivessel coronary artery disease with Prox RCA lesion was 99% stenosed, Mid Cx lesion was 90% stenosed and Prox LAD to Mid LAD lesion was 80% stenosed.      Cath films reviewed and target present.  Plans for OR in th AM, patient and family aware and agree with plan.  Please contact CTS prior to adding on cardiac medications.   Will place pre-op orders today.  Patient will be NPO at midnight in anticipation of surgical revascularization in the morning.

## 2025-02-20 NOTE — ASSESSMENT & PLAN NOTE
Patient's blood pressure range in the last 24 hours was: BP  Min: 133/74  Max: 179/84.The patient's inpatient anti-hypertensive regimen is listed below:    Plan  - BP is controlled, no changes needed to their regimen

## 2025-02-20 NOTE — NURSING TRANSFER
Nursing Transfer Note      2/19/2025   8:32 PM    Nurse giving handoff: Eugene Cruz RN  Nurse receiving handoff:ELLIE Gastelum    Reason patient is being transferred: overnight admission    Transfer From: [facility] room 11 to     Transfer via wheelchair    Transfer with cardiac monitoring    Transported by Eugene Cruz RN    Transfer Vital Signs:  Blood Pressure:148/84    Heart Rate:59  O2:95% on room air  Temperature:99.1  Respirations:18    Telemetry: Rate 64 and Rhythm NSR  Order for Tele Monitor? Yes    Additional Lines:     Medicines sent: none    Any special needs or follow-up needed: assessment    Patient belongings transferred with patient: Yes    Chart send with patient: Yes    Notified: spouse    Patient reassessed at: 2000 on 2/19/2025  Upon arrival to floor: patient oriented to room, call bell in reach, and bed in lowest position

## 2025-02-20 NOTE — ASSESSMENT & PLAN NOTE
Patient's blood pressure range in the last 24 hours was: BP  Min: 133/74  Max: 179/84.The patient's inpatient anti-hypertensive regimen is listed below:    -see chest pain

## 2025-02-20 NOTE — SUBJECTIVE & OBJECTIVE
Interval History: s/p LHC yesterday afternoon, findings c/w significant multivessel coronary disease (see cath results below). Continued on ASA 81 mg indefinitely, high intensity statin. On heparin gtt. Pending CT surgery consult for potential surgical management for 3V CAD. Patient without any new complaints this morning.    Review of Systems   Constitutional: Negative for chills, diaphoresis and night sweats.   Cardiovascular:  Positive for chest pain. Negative for dyspnea on exertion, irregular heartbeat, leg swelling, near-syncope, palpitations and syncope.   Respiratory:  Negative for cough, shortness of breath and wheezing.    Skin:  Negative for color change and dry skin.   Musculoskeletal:  Negative for joint pain and joint swelling.   Gastrointestinal:  Negative for abdominal pain, diarrhea, nausea and vomiting.   Genitourinary:  Negative for dysuria.   Neurological:  Negative for dizziness, headaches, light-headedness and weakness.   All other systems reviewed and are negative.    Objective:     Vital Signs (Most Recent):  Temp: 98.3 °F (36.8 °C) (02/20/25 0715)  Pulse: (!) 57 (02/20/25 0734)  Resp: 18 (02/20/25 0715)  BP: (!) 141/76 (02/20/25 0715)  SpO2: 98 % (02/20/25 0715) Vital Signs (24h Range):  Temp:  [97.3 °F (36.3 °C)-99.1 °F (37.3 °C)] 98.3 °F (36.8 °C)  Pulse:  [53-75] 57  Resp:  [17-20] 18  SpO2:  [93 %-100 %] 98 %  BP: (133-182)/(74-98) 141/76     Weight: 66.7 kg (147 lb)  Body mass index is 22.35 kg/m².     SpO2: 98 %       Intake/Output Summary (Last 24 hours) at 2/20/2025 0922  Last data filed at 2/20/2025 0500  Gross per 24 hour   Intake 360 ml   Output 700 ml   Net -340 ml     Lines/Drains/Airways       Peripheral Intravenous Line  Duration                  Peripheral IV - Single Lumen 02/19/25 1153 20 G Left Antecubital <1 day         Peripheral IV - Single Lumen 02/19/25 1542 20 G Left;Posterior Forearm <1 day                   Physical Exam  Constitutional:       Appearance: He is  well-developed.   HENT:      Head: Normocephalic and atraumatic.   Eyes:      Pupils: Pupils are equal, round, and reactive to light.   Neck:      Vascular: No JVD.   Cardiovascular:      Rate and Rhythm: Normal rate and regular rhythm. No extrasystoles are present.     Heart sounds: Normal heart sounds. No murmur heard.  Pulmonary:      Effort: Pulmonary effort is normal.      Breath sounds: Normal breath sounds. No wheezing or rales.   Abdominal:      General: Bowel sounds are normal. There is no distension.      Palpations: Abdomen is soft.      Tenderness: There is no abdominal tenderness.   Musculoskeletal:         General: Normal range of motion.      Cervical back: Normal range of motion.      Right lower leg: No edema.      Left lower leg: No edema.   Skin:     General: Skin is warm and dry.      Capillary Refill: Capillary refill takes less than 2 seconds.      Findings: No erythema or rash.   Neurological:      General: No focal deficit present.      Mental Status: He is alert and oriented to person, place, and time.   Psychiatric:         Thought Content: Thought content normal.         Judgment: Judgment normal.       Significant Labs: CMP   Recent Labs   Lab 02/19/25  1154 02/20/25  0541    141   K 4.3 3.4*    104   CO2 28 28   GLU 93 83   BUN 14 13   CREATININE 1.2 1.1   CALCIUM 8.8 8.6*   PROT 6.6 5.6*   ALBUMIN 3.7 3.4*   BILITOT 0.3 0.6   ALKPHOS 70 66   AST 55* 29   ALT 23 14   ANIONGAP 9 9   , CBC   Recent Labs   Lab 02/19/25  1154 02/20/25  0541   WBC 5.57 6.62   HGB 14.4 13.4*   HCT 43.4 41.5    249   , Troponin   Recent Labs   Lab 02/19/25  1154   TROPONINIHS 5   , and All pertinent lab results from the last 24 hours have been reviewed.    Significant Imaging:     CT Chest Without Contrast  Narrative: EXAMINATION:  CT CHEST WITHOUT CONTRAST    CLINICAL HISTORY:  preop heart surgery;    TECHNIQUE:  Low dose axial images, sagittal and coronal reformations were obtained from the  thoracic inlet to the lung bases. Contrast was not administered.    COMPARISON:  Chest radiograph 02/19/2025. CTA abdomen pelvis 01/29/2025.    FINDINGS:  Base of Neck: Thyroid is unremarkable.    Thoracic soft tissues: No axillary adenopathy.    Aorta/vasculature: Left-sided aortic arch.  No aneurysm.  Mild aortic and supra aortic calcific atherosclerosis    Heart: Normal size. No effusion.  Coronary artery calcific atherosclerosis.  Minimal mitral calcification.    Pulmonary vasculature: Pulmonary arteries distribute normally.    Luz/Mediastinum: No pathologic marissa enlargement.    Airways: Patent.    Lungs/Pleura: Respiratory motion mildly limits evaluation.  Scattered bilateral micro nodules (axial 4: 234, 285, 322, 327).  Minimal basilar subsegmental atelectasis.  No focal consolidation, pleural effusion, or pneumothorax.    Esophagus: Normal.    Upper Abdomen: Hyperdensity in the bilateral renal collecting systems, likely residual contrast from recent cardiac catheterization.  Suture line in the stomach.  Stable hepatic cyst.  No right or left adrenal mass.    Bones: No acute displaced fracture.  No suspicious lytic or sclerotic lesions.  Impression: 1. No acute intrathoracic abnormality.  2. Coronary artery calcific atherosclerosis.  3. Few bilateral pulmonary micro nodules.  For multiple solid nodules all <6 mm, Fleischner Society 2017 guidelines recommend no routine follow up for a low risk patient, or optional follow up with non-contrast chest CT at 12 months after discovery in a high risk patient.  4. Additional findings as above.    Electronically signed by resident: Agus Stephenson  Date:    02/20/2025  Time:    02:26    Electronically signed by: Ayo Pearson  Date:    02/20/2025  Time:    04:55  US Carotid Bilateral  Narrative: EXAMINATION:  US CAROTID BILATERAL    CLINICAL HISTORY:  preop heart surgery;    TECHNIQUE:  Grayscale and color Doppler ultrasound examination of the carotid and vertebral  artery systems bilaterally.  Stenosis estimates are per the NASCET measurement criteria.    COMPARISON:  None.    FINDINGS:  Right:    Internal Carotid Artery (ICA):    Peak systolic velocity 73 cm/sec    End diastolic velocity 24 cm/sec    ICA/CCA peak systolic ratio: 1.1    ICA/CCA end diastolic ratio: 1.6    Plaque formation: Homogeneous    Vertebral artery: Antegrade flow and normal waveform.    Left:    Internal Carotid Artery (ICA):    Peak systolic velocity 80 cm/sec    End diastolic velocity 27 cm/sec    ICA/CCA peak systolic ratio: 1.1    ICA/CCA end diastolic ratio: 1.4    Plaque formation: Homogeneous    Vertebral artery: Antegrade flow and normal waveform.  Impression: 1-39% calculated carotid bifurcation stenosis bilaterally.    By grayscale appearance, stenosis at the right carotid bifurcation is likely at the upper end of this range.    Electronically signed by resident: Agus Stephenson  Date:    02/20/2025  Time:    00:17    Electronically signed by: Ayo Pearson  Date:    02/20/2025  Time:    01:15      Results for orders placed during the hospital encounter of 02/19/25  Cardiac catheterization  Left Anterior Descending   Prox LAD to Mid LAD lesion was 80% stenosed.   Mid LAD lesion was 50% stenosed.      First Diagonal Branch   The vessel is small.      Left Circumflex   Mid Cx lesion was 90% stenosed.      First Obtuse Marginal Branch   The vessel is small.      Second Obtuse Marginal Branch   Collaterals   2nd Mrg filled by collaterals from Dist LAD.         Right Coronary Artery   Prox RCA lesion was 99% stenosed.

## 2025-02-20 NOTE — ASSESSMENT & PLAN NOTE
Patient with known CAD s/p  [facility] on 2/19 with significant multivessel disease , which is uncontrolled Will continue ASA and Statin and monitor for S/Sx of angina/ACS. Continue to monitor on telemetry.     -see chest pain

## 2025-02-20 NOTE — PROGRESS NOTES
[facility] - Cardiology Stepdown  Cardiology  Progress Note    Patient Name: Ye Partida  MRN: [id number]  Admission Date: 2/19/2025  Hospital Length of Stay: 0 days  Code Status: Full Code   Attending Physician: King Spencer, *   Primary Care Physician: Cameron Tran MD  Expected Discharge Date: 2/26/2025  Principal Problem:Chest pain    Subjective:     Hospital Course:   No notes on file    Interval History: s/p LHC yesterday afternoon, findings c/w significant multivessel coronary disease (see cath results below). Continued on ASA 81 mg indefinitely, high intensity statin. On heparin gtt. Pending CT surgery consult for potential surgical management for 3V CAD. Patient without any new complaints this morning.    Review of Systems   Constitutional: Negative for chills, diaphoresis and night sweats.   Cardiovascular:  Positive for chest pain. Negative for dyspnea on exertion, irregular heartbeat, leg swelling, near-syncope, palpitations and syncope.   Respiratory:  Negative for cough, shortness of breath and wheezing.    Skin:  Negative for color change and dry skin.   Musculoskeletal:  Negative for joint pain and joint swelling.   Gastrointestinal:  Negative for abdominal pain, diarrhea, nausea and vomiting.   Genitourinary:  Negative for dysuria.   Neurological:  Negative for dizziness, headaches, light-headedness and weakness.   All other systems reviewed and are negative.    Objective:     Vital Signs (Most Recent):  Temp: 98.3 °F (36.8 °C) (02/20/25 0715)  Pulse: (!) 57 (02/20/25 0734)  Resp: 18 (02/20/25 0715)  BP: (!) 141/76 (02/20/25 0715)  SpO2: 98 % (02/20/25 0715) Vital Signs (24h Range):  Temp:  [97.3 °F (36.3 °C)-99.1 °F (37.3 °C)] 98.3 °F (36.8 °C)  Pulse:  [53-75] 57  Resp:  [17-20] 18  SpO2:  [93 %-100 %] 98 %  BP: (133-182)/(74-98) 141/76     Weight: 66.7 kg (147 lb)  Body mass index is 22.35 kg/m².     SpO2: 98 %       Intake/Output Summary (Last 24 hours) at 2/20/2025  0922  Last data filed at 2/20/2025 0500  Gross per 24 hour   Intake 360 ml   Output 700 ml   Net -340 ml     Lines/Drains/Airways       Peripheral Intravenous Line  Duration                  Peripheral IV - Single Lumen 02/19/25 1153 20 G Left Antecubital <1 day         Peripheral IV - Single Lumen 02/19/25 1542 20 G Left;Posterior Forearm <1 day                   Physical Exam  Constitutional:       Appearance: He is well-developed.   HENT:      Head: Normocephalic and atraumatic.   Eyes:      Pupils: Pupils are equal, round, and reactive to light.   Neck:      Vascular: No JVD.   Cardiovascular:      Rate and Rhythm: Normal rate and regular rhythm. No extrasystoles are present.     Heart sounds: Normal heart sounds. No murmur heard.  Pulmonary:      Effort: Pulmonary effort is normal.      Breath sounds: Normal breath sounds. No wheezing or rales.   Abdominal:      General: Bowel sounds are normal. There is no distension.      Palpations: Abdomen is soft.      Tenderness: There is no abdominal tenderness.   Musculoskeletal:         General: Normal range of motion.      Cervical back: Normal range of motion.      Right lower leg: No edema.      Left lower leg: No edema.   Skin:     General: Skin is warm and dry.      Capillary Refill: Capillary refill takes less than 2 seconds.      Findings: No erythema or rash.   Neurological:      General: No focal deficit present.      Mental Status: He is alert and oriented to person, place, and time.   Psychiatric:         Thought Content: Thought content normal.         Judgment: Judgment normal.       Significant Labs: CMP   Recent Labs   Lab 02/19/25  1154 02/20/25  0541    141   K 4.3 3.4*    104   CO2 28 28   GLU 93 83   BUN 14 13   CREATININE 1.2 1.1   CALCIUM 8.8 8.6*   PROT 6.6 5.6*   ALBUMIN 3.7 3.4*   BILITOT 0.3 0.6   ALKPHOS 70 66   AST 55* 29   ALT 23 14   ANIONGAP 9 9   , CBC   Recent Labs   Lab 02/19/25  1154 02/20/25  0541   WBC 5.57 6.62   HGB  14.4 13.4*   HCT 43.4 41.5    249   , Troponin   Recent Labs   Lab 02/19/25  1154   TROPONINIHS 5   , and All pertinent lab results from the last 24 hours have been reviewed.    Significant Imaging:     CT Chest Without Contrast  Narrative: EXAMINATION:  CT CHEST WITHOUT CONTRAST    CLINICAL HISTORY:  preop heart surgery;    TECHNIQUE:  Low dose axial images, sagittal and coronal reformations were obtained from the thoracic inlet to the lung bases. Contrast was not administered.    COMPARISON:  Chest radiograph 02/19/2025. CTA abdomen pelvis 01/29/2025.    FINDINGS:  Base of Neck: Thyroid is unremarkable.    Thoracic soft tissues: No axillary adenopathy.    Aorta/vasculature: Left-sided aortic arch.  No aneurysm.  Mild aortic and supra aortic calcific atherosclerosis    Heart: Normal size. No effusion.  Coronary artery calcific atherosclerosis.  Minimal mitral calcification.    Pulmonary vasculature: Pulmonary arteries distribute normally.    Luz/Mediastinum: No pathologic marissa enlargement.    Airways: Patent.    Lungs/Pleura: Respiratory motion mildly limits evaluation.  Scattered bilateral micro nodules (axial 4: 234, 285, 322, 327).  Minimal basilar subsegmental atelectasis.  No focal consolidation, pleural effusion, or pneumothorax.    Esophagus: Normal.    Upper Abdomen: Hyperdensity in the bilateral renal collecting systems, likely residual contrast from recent cardiac catheterization.  Suture line in the stomach.  Stable hepatic cyst.  No right or left adrenal mass.    Bones: No acute displaced fracture.  No suspicious lytic or sclerotic lesions.  Impression: 1. No acute intrathoracic abnormality.  2. Coronary artery calcific atherosclerosis.  3. Few bilateral pulmonary micro nodules.  For multiple solid nodules all <6 mm, Fleischner Society 2017 guidelines recommend no routine follow up for a low risk patient, or optional follow up with non-contrast chest CT at 12 months after discovery in a high  risk patient.  4. Additional findings as above.    Electronically signed by resident: Agus Stephenson  Date:    02/20/2025  Time:    02:26    Electronically signed by: Ayo Pearson  Date:    02/20/2025  Time:    04:55  US Carotid Bilateral  Narrative: EXAMINATION:  US CAROTID BILATERAL    CLINICAL HISTORY:  preop heart surgery;    TECHNIQUE:  Grayscale and color Doppler ultrasound examination of the carotid and vertebral artery systems bilaterally.  Stenosis estimates are per the NASCET measurement criteria.    COMPARISON:  None.    FINDINGS:  Right:    Internal Carotid Artery (ICA):    Peak systolic velocity 73 cm/sec    End diastolic velocity 24 cm/sec    ICA/CCA peak systolic ratio: 1.1    ICA/CCA end diastolic ratio: 1.6    Plaque formation: Homogeneous    Vertebral artery: Antegrade flow and normal waveform.    Left:    Internal Carotid Artery (ICA):    Peak systolic velocity 80 cm/sec    End diastolic velocity 27 cm/sec    ICA/CCA peak systolic ratio: 1.1    ICA/CCA end diastolic ratio: 1.4    Plaque formation: Homogeneous    Vertebral artery: Antegrade flow and normal waveform.  Impression: 1-39% calculated carotid bifurcation stenosis bilaterally.    By grayscale appearance, stenosis at the right carotid bifurcation is likely at the upper end of this range.    Electronically signed by resident: Agus Stephenson  Date:    02/20/2025  Time:    00:17    Electronically signed by: Ayo Pearson  Date:    02/20/2025  Time:    01:15      Results for orders placed during the hospital encounter of 02/19/25  Cardiac catheterization  Left Anterior Descending   Prox LAD to Mid LAD lesion was 80% stenosed.   Mid LAD lesion was 50% stenosed.      First Diagonal Branch   The vessel is small.      Left Circumflex   Mid Cx lesion was 90% stenosed.      First Obtuse Marginal Branch   The vessel is small.      Second Obtuse Marginal Branch   Collaterals   2nd Mrg filled by collaterals from Dist LAD.         Right Coronary Artery    Prox RCA lesion was 99% stenosed.          Assessment and Plan:     * Chest pain  PMHx of CAD on CT and positive stress echo who presented with chest pain. EKG negative for ischemic changes. Biomarkers have been unremarkable on initial evaluation.    Case was discussed with Interventional Cardiology upon admission, patient now s/p [facility]/coronary angiogram with findings as below.     Cardiac catheterization:  Prox LAD to Mid LAD lesion was 80% stenosed.   Mid LAD lesion was 50% stenosed.  Mid left Cx lesion was 90% stenosed.  Prox RCA lesion was 99% stenosed.     PLAN:  -continue aspirin 81 mg daily indefinitely  -continue high intensity statin therapy (LDL goal < 70)  -risk factor reduction (BP < 130/80 mmHg, glycemic control, etc)  -CT surgery was consulted for 3 vessel CAD, plans for surgical revascularization 2/21  -prn sublingual nitroglycerin if has chest pain  -consider addition of amlodipine 5 mg daily for goal BP < 130/80 mmHg   -hold addition of medications such as plavix, beta blockers, ACE inhibitors/ARBs while anticipating surgical intervention  -patient to follow up with his outpatient cardiologist (dr tobin) upon hospital discharge      CAD (coronary artery disease)  Patient with known CAD s/p  [facility] on 2/19 with significant multivessel disease , which is uncontrolled Will continue ASA and Statin and monitor for S/Sx of angina/ACS. Continue to monitor on telemetry.     -see chest pain    Primary hypertension  Patient's blood pressure range in the last 24 hours was: BP  Min: 133/74  Max: 179/84.The patient's inpatient anti-hypertensive regimen is listed below:    -see chest pain    Mixed hyperlipidemia  LDL 79 on lipid panel 1/29/25. Current home meds include atorvastatin 10 mg which he notes he requested to start for secondary prevention.     -continue atorvastatin 40 mg daily      Thank you for your consult. General Cardiology will sign off. Please contact us if you have any additional questions or  concerns.      VTE Risk Mitigation (From admission, onward)           Ordered     heparin 25,000 units in dextrose 5% (100 units/ml) IV bolus from bag LOW INTENSITY nomogram - [facility]  As needed (PRN)        Question:  Heparin Infusion Adjustment (DO NOT MODIFY ANSWER)  Answer:  [email or web address]\epic\Images\Pharmacy\HeparinInfusions\heparin LOW INTENSITY nomogram for [facility] [id number]    02/19/25 1701     heparin 25,000 units in dextrose 5% (100 units/ml) IV bolus from bag LOW INTENSITY nomogram - [facility]  As needed (PRN)        Question:  Heparin Infusion Adjustment (DO NOT MODIFY ANSWER)  Answer:  [email or web address]\epic\Images\Pharmacy\HeparinInfusions\heparin LOW INTENSITY nomogram for [facility] [id number]    02/19/25 1701     heparin 25,000 units in dextrose 5% 250 mL (100 units/mL) infusion LOW INTENSITY nomogram - [facility]  Continuous        Question:  Begin at (units/kg/hr)  Answer:  12    02/19/25 1701     IP VTE HIGH RISK PATIENT  Once         02/19/25 1430     Place sequential compression device  Until discontinued         02/19/25 1430                    Balbina Rojas MD  Cardiology  [facility]  I have personally taken the history and examined the patient and agree with the resident's note as stated above.

## 2025-02-20 NOTE — SUBJECTIVE & OBJECTIVE
Interval History: Patient has 3 vessel disease seen on heart cath and is scheduled for CABG tomorrow 02/21.    Review of Systems   Constitutional:  Negative for chills, fatigue and fever.   Respiratory:  Negative for chest tightness and shortness of breath.    Cardiovascular:  Negative for chest pain, palpitations and leg swelling.   Gastrointestinal:  Negative for abdominal pain, nausea and vomiting.   Neurological:  Negative for dizziness and light-headedness.     Objective:     Vital Signs (Most Recent):  Temp: 98.3 °F (36.8 °C) (02/20/25 0715)  Pulse: 61 (02/20/25 1017)  Resp: 18 (02/20/25 0715)  BP: (!) 141/76 (02/20/25 0715)  SpO2: 98 % (02/20/25 0715) Vital Signs (24h Range):  Temp:  [97.3 °F (36.3 °C)-99.1 °F (37.3 °C)] 98.3 °F (36.8 °C)  Pulse:  [53-85] 61  Resp:  [17-18] 18  SpO2:  [93 %-100 %] 98 %  BP: (133-179)/(74-91) 141/76     Weight: 66.7 kg (147 lb)  Body mass index is 22.35 kg/m².    Intake/Output Summary (Last 24 hours) at 2/20/2025 1322  Last data filed at 2/20/2025 0500  Gross per 24 hour   Intake 360 ml   Output 700 ml   Net -340 ml         Physical Exam  Constitutional:       General: He is not in acute distress.     Appearance: Normal appearance. He is not ill-appearing.   HENT:      Head: Normocephalic and atraumatic.      Mouth/Throat:      Mouth: Mucous membranes are moist.   Eyes:      Extraocular Movements: Extraocular movements intact.      Conjunctiva/sclera: Conjunctivae normal.   Cardiovascular:      Rate and Rhythm: Normal rate and regular rhythm.      Heart sounds: Normal heart sounds.   Pulmonary:      Effort: Pulmonary effort is normal. No respiratory distress.      Breath sounds: Normal breath sounds.   Abdominal:      General: Abdomen is flat. Bowel sounds are normal. There is no distension.      Palpations: Abdomen is soft.      Tenderness: There is no abdominal tenderness. There is no guarding.   Musculoskeletal:         General: No swelling.      Right lower leg: No edema.       Left lower leg: No edema.   Skin:     General: Skin is warm.   Neurological:      General: No focal deficit present.      Mental Status: He is alert and oriented to person, place, and time.   Psychiatric:         Mood and Affect: Mood normal.         Behavior: Behavior normal.             Significant Labs: All pertinent labs within the past 24 hours have been reviewed.    Significant Imaging: I have reviewed all pertinent imaging results/findings within the past 24 hours.

## 2025-02-20 NOTE — PLAN OF CARE
Patient free of falls or injuries. Denied any pain or discomfort. No worsening redness or  swelling to right radial access site when arrived to [facility]. Dressing to radial site CDI, no signs of bleeding noted. Heparin gtt infusing at 12 units/kg/hr per order. aPTT pending. NSR and sinus bradycardia on the cardiac monitor. Ambulates independently. No other events overnight. Plan of care reviewed. All questions and concerns addressed.       Problem: Adult Inpatient Plan of Care  Goal: Plan of Care Review  Outcome: Progressing  Goal: Patient-Specific Goal (Individualized)  Outcome: Progressing  Goal: Absence of Hospital-Acquired Illness or Injury  Outcome: Progressing  Goal: Optimal Comfort and Wellbeing  Outcome: Progressing  Goal: Readiness for Transition of Care  Outcome: Progressing     Problem: Wound  Goal: Optimal Coping  Outcome: Progressing  Goal: Optimal Functional Ability  Outcome: Progressing  Goal: Absence of Infection Signs and Symptoms  Outcome: Progressing  Goal: Improved Oral Intake  Outcome: Progressing  Goal: Optimal Pain Control and Function  Outcome: Progressing  Goal: Skin Health and Integrity  Outcome: Progressing  Goal: Optimal Wound Healing  Outcome: Progressing     Problem: Fall Injury Risk  Goal: Absence of Fall and Fall-Related Injury  Outcome: Progressing

## 2025-02-20 NOTE — CONSULTS
[facility] - Cardiology Stepdown  Cardiothoracic Surgery  Consult Note    Patient Name: Ye Partida  MRN: [id number]  Admission Date: 2/19/2025  Attending Physician: King Spencer, *  Referring Provider: Self, Aaareferral    Patient information was obtained from patient, past medical records, and ER records.     Inpatient consult to Cardiothoracic Surgery  Consult performed by: Adamaris Arnold NP  Consult ordered by: Edwardo De Jesus MD  Reason for consult: cabg eval        Subjective:     Principal Problem: Chest pain    History of Present Illness: Mr. Ye Partida is a 69 year old male with a PMHx of CAD, HTN, seizure disorder, and peripheral neuropathy who presents to [facility] after having chest pain for 3 days. Stress test done for a preop evaluation to be a kidney donor was positive. Stress echo revealed EF 50-55%, EKG revealed ST seg depression in inferolateral leads (II, III, aVF, V5 and V6), post stress EF reduced to 43%. Patient states that 5 days ago he began to notice sharp left sided pain that was intermittent and occurred in the morning, then the past 2 days including this morning, noticed anterior chest heaviness that lasted 45 minutes, which self resolved without nitroglycerin or pain medication. Pain was occurring at rest in the morning.     No current facility-administered medications on file prior to encounter.     Current Outpatient Medications on File Prior to Encounter   Medication Sig    ascorbic acid (VITAMIN C) 1000 MG tablet Take 1,000 mg by mouth once daily.    cyanocobalamin (VITAMIN B-12) 100 MCG tablet Take 100 mcg by mouth once daily.    lamotrigine (LAMICTAL) 200 MG tablet Take 1 tablet by mouth  daily    lorazepam (ATIVAN) 0.5 MG tablet Take 1 tablet (0.5 mg total) by mouth 2 (two) times daily. Take 1/2 tablet (0.25mg) in am, take 1 tablet (0.5mg) in pm (Patient taking differently: Take 0.5 mg by mouth 2 (two) times daily.)       Review of patient's allergies indicates:    Allergen Reactions    Antihistamines - alkylamine Other (See Comments)     Heart race    Codeine Other (See Comments)     Heart race       Past Medical History:   Diagnosis Date    Hyperlipidemia     Pyloric stenosis, congenital     s/p repair    Seizures      Past Surgical History:   Procedure Laterality Date    CORONARY ANGIOGRAPHY Right 2025    Procedure: ANGIOGRAM, CORONARY ARTERY;  Surgeon: Ari Latham III, MD;  Location: [facility] CATH LAB;  Service: Cardiology;  Laterality: Right;    pyloric stenosis      TONSILLECTOMY       Family History       Problem Relation (Age of Onset)    Alcohol abuse Sister, Brother    Arthritis Mother    Cancer Mother, Father    Crohn's disease Son    Diabetes Son    Heart disease Sister    Hypertension Mother, Sister, Brother    Kidney disease Son    Obesity Sister, Brother          Tobacco Use    Smoking status: Former     Current packs/day: 0.00     Types: Cigarettes     Quit date:      Years since quittin.    Smokeless tobacco: Never   Substance and Sexual Activity    Alcohol use: No    Drug use: No    Sexual activity: Yes     Partners: Female     Review of Systems   Constitutional:  Negative for activity change, appetite change, fatigue and fever.   HENT:  Negative for nosebleeds.    Respiratory:  Negative for cough and shortness of breath.    Cardiovascular:  Negative for chest pain, palpitations and leg swelling.   Gastrointestinal:  Negative for abdominal distention, abdominal pain and nausea.   Genitourinary:  Negative for frequency.   Musculoskeletal:  Negative for arthralgias and myalgias.   Skin:  Negative for rash.   Neurological:  Negative for dizziness and numbness.   Hematological:  Does not bruise/bleed easily.     Objective:     Vital Signs (Most Recent):  Temp: 98.3 °F (36.8 °C) (25 0715)  Pulse: 61 (25 1017)  Resp: 18 (25 0715)  BP: (!) 141/76 (25 0715)  SpO2: 98 % (25 0715) Vital Signs (24h Range):  Temp:  [97.3 °F  (36.3 °C)-99.1 °F (37.3 °C)] 98.3 °F (36.8 °C)  Pulse:  [53-85] 61  Resp:  [17-18] 18  SpO2:  [93 %-100 %] 98 %  BP: (133-179)/(74-91) 141/76     Weight: 66.7 kg (147 lb)  Body mass index is 22.35 kg/m².    SpO2: 98 %        Intake/Output - Last 3 Shifts         02/18 0700  02/19 0659 02/19 0700 02/20 0659 02/20 0700 02/21 0659    P.O.  360     Total Intake(mL/kg)  360 (5.4)     Urine (mL/kg/hr)  700     Total Output  700     Net  -340                     Lines/Drains/Airways       Peripheral Intravenous Line  Duration                  Peripheral IV - Single Lumen 02/19/25 1153 20 G Left Antecubital 1 day         Peripheral IV - Single Lumen 02/19/25 1542 20 G Left;Posterior Forearm <1 day                          Physical Exam  HENT:      Head: Normocephalic and atraumatic.   Eyes:      Extraocular Movements: Extraocular movements intact.   Cardiovascular:      Rate and Rhythm: Normal rate and regular rhythm.   Pulmonary:      Effort: Pulmonary effort is normal.   Abdominal:      General: Abdomen is flat.      Palpations: Abdomen is soft.   Musculoskeletal:         General: Normal range of motion.      Cervical back: Normal range of motion.   Skin:     General: Skin is warm and dry.      Capillary Refill: Capillary refill takes less than 2 seconds.   Neurological:      General: No focal deficit present.            Significant Labs:  BMP:   Recent Labs   Lab 02/20/25  0541   GLU 83      K 3.4*      CO2 28   BUN 13   CREATININE 1.1   CALCIUM 8.6*   MG 1.9     CBC:   Recent Labs   Lab 02/20/25  0541   WBC 6.62   RBC 4.50*   HGB 13.4*   HCT 41.5      MCV 92   MCH 29.8   MCHC 32.3     CMP:   Recent Labs   Lab 02/20/25  0541   GLU 83   CALCIUM 8.6*   ALBUMIN 3.4*   PROT 5.6*      K 3.4*   CO2 28      BUN 13   CREATININE 1.1   ALKPHOS 66   ALT 14   AST 29   BILITOT 0.6     Coagulation:   Recent Labs   Lab 02/20/25  0541   APTT 48.4*       Significant Diagnostics:  ECHO:    Left Ventricle:  The left ventricle is normal in size. Ventricular mass is normal. Normal wall thickness. Regional wall motion abnormalities present. See diagram for wall motion findings. There is low normal to slightly reduced  systolic function with a visually estimated ejection fraction of 50 - 55%. Ejection fraction is approximately 50%. There is normal diastolic function.    Right Ventricle: Normal right ventricular cavity size. Wall thickness is normal. Systolic function is normal.    Mitral Valve: There is mild bileaflet sclerosis. There is mild regurgitation.    Pulmonic Valve: There is mild regurgitation.    IVC/SVC: Normal venous pressure at 3 mmHg.    Baseline ECG: The Baseline ECG reveals sinus rhythm. The axis is normal. The ST segments are normal.    Stress ECG: There is 1.5 mm of downward-sloping ST segment depression in the inferolateral leads (II, III, aVF, V5 and V6) noted during stress. There are no arrhythmias during stress. There is normal blood pressure response with stress.    ECG Conclusion: The ECG portion of the study is positive for ischemia.    Post-stress Echo: The left ventricle systolic function is mildly decreased with an EF of 43%. The post-stress echo showed wall motion abnormalities compared to baseline. Right ventricle systolic function is normal.    Post-stress Conclusion: The study is consistent with ischemia and consistent with scar and probably 3 vessel CAD.    Carotids:  Impression:  1-39% calculated carotid bifurcation stenosis bilaterally.  By grayscale appearance, stenosis at the right carotid bifurcation is likely at the upper end of this range.    Ct Chest:  1. No acute intrathoracic abnormality.  2. Coronary artery calcific atherosclerosis.  3. Few bilateral pulmonary micro nodules.  For multiple solid nodules all <6 mm, Fleischner Society 2017 guidelines recommend no routine follow up for a low risk patient, or optional follow up with non-contrast chest CT at 12 months after discovery  in a high risk patient.  4. Additional findings as above.    LHC:      The Prox RCA lesion was 99% stenosed.    The Mid Cx lesion was 90% stenosed.    The Prox LAD to Mid LAD lesion was 80% stenosed.    There was three vessel coronary artery disease.  I have reviewed and interpreted all pertinent imaging results/findings within the past 24 hours.    Assessment/Plan:     NYHA Score: NYHA I: cardiac disease, but without resulting limitations of physical activity    CAD (coronary artery disease)  Mr. Ye Partida is a 69 year old male with a PMHx of CAD, HTN, seizure disorder, and peripheral neuropathy who [place] [facility] chest pain for 3 days. Stress test done for a preop evaluation to be a kidney donor was positive. Stress echo revealed EF 50-55%, EKG revealed ST seg depression in inferolateral leads (II, III, aVF, V5 and V6), post stress EF reduced to 43%.    During his admission, he underwent a cardiac evaluation which demonstrated multivessel coronary artery disease with Prox RCA lesion was 99% stenosed, Mid Cx lesion was 90% stenosed and Prox LAD to Mid LAD lesion was 80% stenosed.      Cath films reviewed and target present.  Plans for OR in th AM, patient and family aware and agree with plan.  Please contact CTS prior to adding on cardiac medications.   Will place pre-op orders today.  Patient will be NPO at midnight in anticipation of surgical revascularization in the morning.         Thank you for your consult. I will follow-up with patient. Please contact us if you have any additional questions.    Adamaris Arnold NP  Cardiothoracic Surgery  [facility] - Cardiology Stepdown    Patient seen and pertinent studies were reviewed.  This is a 69-year-old patient who was found to have unstable angina and underwent a coronary angiography which showed triple-vessel disease.  Cardiothoracic surgery was consulted.  We will complete the workup.  Risks and benefits and treatment options were discussed and  patient wants to proceed with surgical revascularization.  All questions and concerns were addressed.  We will plan for surgical revascularization on February 21, 2025.  Dr. Latham in agreement with the plan.

## 2025-02-21 ENCOUNTER — TELEPHONE (OUTPATIENT)
Dept: CARDIAC REHAB | Facility: CLINIC | Age: 70
End: 2025-02-21
Payer: MEDICARE

## 2025-02-21 ENCOUNTER — ANESTHESIA (OUTPATIENT)
Dept: SURGERY | Facility: HOSPITAL | Age: 70
DRG: 234 | End: 2025-02-21
Payer: MEDICARE

## 2025-02-21 DIAGNOSIS — Z95.1 S/P CABG (CORONARY ARTERY BYPASS GRAFT): Primary | ICD-10-CM

## 2025-02-21 DIAGNOSIS — I25.10 CORONARY ARTERY DISEASE, UNSPECIFIED VESSEL OR LESION TYPE, UNSPECIFIED WHETHER ANGINA PRESENT, UNSPECIFIED WHETHER NATIVE OR TRANSPLANTED HEART: ICD-10-CM

## 2025-02-21 DIAGNOSIS — Z95.1 POSTSURGICAL AORTOCORONARY BYPASS STATUS: Primary | ICD-10-CM

## 2025-02-21 PROBLEM — R73.9 TRANSIENT HYPERGLYCEMIA POST PROCEDURE: Status: ACTIVE | Noted: 2025-02-21

## 2025-02-21 LAB
ALBUMIN SERPL BCP-MCNC: 2.3 G/DL (ref 3.5–5.2)
ALBUMIN SERPL BCP-MCNC: 3.4 G/DL (ref 3.5–5.2)
ALLENS TEST: ABNORMAL
ALP SERPL-CCNC: 46 U/L (ref 40–150)
ALP SERPL-CCNC: 67 U/L (ref 40–150)
ALT SERPL W/O P-5'-P-CCNC: 12 U/L (ref 10–44)
ALT SERPL W/O P-5'-P-CCNC: 15 U/L (ref 10–44)
ANION GAP SERPL CALC-SCNC: 7 MMOL/L (ref 8–16)
ANION GAP SERPL CALC-SCNC: 9 MMOL/L (ref 8–16)
ANISOCYTOSIS BLD QL SMEAR: SLIGHT
APTT PPP: 27.7 SEC (ref 21–32)
AST SERPL-CCNC: 25 U/L (ref 10–40)
AST SERPL-CCNC: 32 U/L (ref 10–40)
BASOPHILS # BLD AUTO: 0.03 K/UL (ref 0–0.2)
BASOPHILS # BLD AUTO: 0.08 K/UL (ref 0–0.2)
BASOPHILS NFR BLD: 0.3 % (ref 0–1.9)
BASOPHILS NFR BLD: 1.1 % (ref 0–1.9)
BILIRUB SERPL-MCNC: 0.4 MG/DL (ref 0.1–1)
BILIRUB SERPL-MCNC: 0.6 MG/DL (ref 0.1–1)
BLD PROD TYP BPU: NORMAL
BLOOD UNIT EXPIRATION DATE: NORMAL
BLOOD UNIT TYPE CODE: 6200
BLOOD UNIT TYPE: NORMAL
BUN SERPL-MCNC: 11 MG/DL (ref 8–23)
BUN SERPL-MCNC: 16 MG/DL (ref 8–23)
CA-I BLDV-SCNC: 1.13 MMOL/L (ref 1.06–1.42)
CALCIUM SERPL-MCNC: 8.1 MG/DL (ref 8.7–10.5)
CALCIUM SERPL-MCNC: 8.7 MG/DL (ref 8.7–10.5)
CHLORIDE SERPL-SCNC: 106 MMOL/L (ref 95–110)
CHLORIDE SERPL-SCNC: 111 MMOL/L (ref 95–110)
CO2 SERPL-SCNC: 20 MMOL/L (ref 23–29)
CO2 SERPL-SCNC: 28 MMOL/L (ref 23–29)
CODING SYSTEM: NORMAL
CREAT SERPL-MCNC: 0.9 MG/DL (ref 0.5–1.4)
CREAT SERPL-MCNC: 1.1 MG/DL (ref 0.5–1.4)
CROSSMATCH INTERPRETATION: NORMAL
DELSYS: ABNORMAL
DIFFERENTIAL METHOD BLD: ABNORMAL
DIFFERENTIAL METHOD BLD: ABNORMAL
DISPENSE STATUS: NORMAL
EOSINOPHIL # BLD AUTO: 0.1 K/UL (ref 0–0.5)
EOSINOPHIL # BLD AUTO: 0.2 K/UL (ref 0–0.5)
EOSINOPHIL NFR BLD: 0.5 % (ref 0–8)
EOSINOPHIL NFR BLD: 3.1 % (ref 0–8)
ERYTHROCYTE [DISTWIDTH] IN BLOOD BY AUTOMATED COUNT: 15.1 % (ref 11.5–14.5)
ERYTHROCYTE [DISTWIDTH] IN BLOOD BY AUTOMATED COUNT: 15.2 % (ref 11.5–14.5)
ERYTHROCYTE [DISTWIDTH] IN BLOOD BY AUTOMATED COUNT: 15.7 % (ref 11.5–14.5)
ERYTHROCYTE [SEDIMENTATION RATE] IN BLOOD BY WESTERGREN METHOD: 16 MM/H
ERYTHROCYTE [SEDIMENTATION RATE] IN BLOOD BY WESTERGREN METHOD: 16 MM/H
ERYTHROCYTE [SEDIMENTATION RATE] IN BLOOD BY WESTERGREN METHOD: 20 MM/H
EST. GFR  (NO RACE VARIABLE): >60 ML/MIN/1.73 M^2
EST. GFR  (NO RACE VARIABLE): >60 ML/MIN/1.73 M^2
FIBRINOGEN PPP-MCNC: 147 MG/DL (ref 182–400)
FIBRINOGEN PPP-MCNC: 267 MG/DL (ref 182–400)
FIO2: 100
FIO2: 40
FIO2: 50
FIO2: 55
FLOW: 1
GLUCOSE SERPL-MCNC: 106 MG/DL (ref 70–110)
GLUCOSE SERPL-MCNC: 108 MG/DL (ref 70–110)
GLUCOSE SERPL-MCNC: 112 MG/DL (ref 70–110)
GLUCOSE SERPL-MCNC: 119 MG/DL (ref 70–110)
GLUCOSE SERPL-MCNC: 122 MG/DL (ref 70–110)
GLUCOSE SERPL-MCNC: 123 MG/DL (ref 70–110)
GLUCOSE SERPL-MCNC: 131 MG/DL (ref 70–110)
GLUCOSE SERPL-MCNC: 95 MG/DL (ref 70–110)
GLUCOSE SERPL-MCNC: 98 MG/DL (ref 70–110)
HCO3 UR-SCNC: 20.8 MMOL/L (ref 24–28)
HCO3 UR-SCNC: 22.7 MMOL/L (ref 24–28)
HCO3 UR-SCNC: 22.9 MMOL/L (ref 24–28)
HCO3 UR-SCNC: 23.3 MMOL/L (ref 24–28)
HCO3 UR-SCNC: 24.5 MMOL/L (ref 24–28)
HCO3 UR-SCNC: 25.1 MMOL/L (ref 24–28)
HCO3 UR-SCNC: 25.7 MMOL/L (ref 24–28)
HCO3 UR-SCNC: 25.7 MMOL/L (ref 24–28)
HCO3 UR-SCNC: 26.2 MMOL/L (ref 24–28)
HCO3 UR-SCNC: 26.2 MMOL/L (ref 24–28)
HCO3 UR-SCNC: 27.2 MMOL/L (ref 24–28)
HCT VFR BLD AUTO: 25.2 % (ref 40–54)
HCT VFR BLD AUTO: 30.5 % (ref 40–54)
HCT VFR BLD AUTO: 44.8 % (ref 40–54)
HCT VFR BLD CALC: 21 %PCV (ref 36–54)
HCT VFR BLD CALC: 24 %PCV (ref 36–54)
HCT VFR BLD CALC: 26 %PCV (ref 36–54)
HCT VFR BLD CALC: 28 %PCV (ref 36–54)
HCT VFR BLD CALC: 31 %PCV (ref 36–54)
HCT VFR BLD CALC: 34 %PCV (ref 36–54)
HGB BLD-MCNC: 10.1 G/DL (ref 14–18)
HGB BLD-MCNC: 14.1 G/DL (ref 14–18)
HGB BLD-MCNC: 8.4 G/DL (ref 14–18)
HYPOCHROMIA BLD QL SMEAR: ABNORMAL
IMM GRANULOCYTES # BLD AUTO: 0.05 K/UL (ref 0–0.04)
IMM GRANULOCYTES # BLD AUTO: 0.09 K/UL (ref 0–0.04)
IMM GRANULOCYTES NFR BLD AUTO: 0.5 % (ref 0–0.5)
IMM GRANULOCYTES NFR BLD AUTO: 1.2 % (ref 0–0.5)
INR PPP: 1.2 (ref 0.8–1.2)
INR PPP: 1.7 (ref 0.8–1.2)
LACTATE SERPL-SCNC: 1.1 MMOL/L (ref 0.5–2.2)
LACTATE SERPL-SCNC: 1.4 MMOL/L (ref 0.5–2.2)
LACTATE SERPL-SCNC: 1.6 MMOL/L (ref 0.5–2.2)
LDH SERPL L TO P-CCNC: 0.49 MMOL/L (ref 0.36–1.25)
LDH SERPL L TO P-CCNC: 0.99 MMOL/L (ref 0.36–1.25)
LDH SERPL L TO P-CCNC: 1.43 MMOL/L (ref 0.36–1.25)
LYMPHOCYTES # BLD AUTO: 1.4 K/UL (ref 1–4.8)
LYMPHOCYTES # BLD AUTO: 1.6 K/UL (ref 1–4.8)
LYMPHOCYTES NFR BLD: 14.2 % (ref 18–48)
LYMPHOCYTES NFR BLD: 18.5 % (ref 18–48)
MAGNESIUM SERPL-MCNC: 2.2 MG/DL (ref 1.6–2.6)
MAGNESIUM SERPL-MCNC: 2.9 MG/DL (ref 1.6–2.6)
MCH RBC QN AUTO: 29.8 PG (ref 27–31)
MCH RBC QN AUTO: 30.3 PG (ref 27–31)
MCH RBC QN AUTO: 30.4 PG (ref 27–31)
MCHC RBC AUTO-ENTMCNC: 31.5 G/DL (ref 32–36)
MCHC RBC AUTO-ENTMCNC: 33.1 G/DL (ref 32–36)
MCHC RBC AUTO-ENTMCNC: 33.3 G/DL (ref 32–36)
MCV RBC AUTO: 90 FL (ref 82–98)
MCV RBC AUTO: 91 FL (ref 82–98)
MCV RBC AUTO: 97 FL (ref 82–98)
MIN VOL: 7.99
MIN VOL: 9.4
MODE: ABNORMAL
MONOCYTES # BLD AUTO: 0.2 K/UL (ref 0.3–1)
MONOCYTES # BLD AUTO: 0.7 K/UL (ref 0.3–1)
MONOCYTES NFR BLD: 1.5 % (ref 4–15)
MONOCYTES NFR BLD: 8.9 % (ref 4–15)
NEUTROPHILS # BLD AUTO: 4.9 K/UL (ref 1.8–7.7)
NEUTROPHILS # BLD AUTO: 9.2 K/UL (ref 1.8–7.7)
NEUTROPHILS NFR BLD: 67.2 % (ref 38–73)
NEUTROPHILS NFR BLD: 83 % (ref 38–73)
NRBC BLD-RTO: 0 /100 WBC
NRBC BLD-RTO: 0 /100 WBC
PCO2 BLDA: 30 MMHG (ref 35–45)
PCO2 BLDA: 30.3 MMHG (ref 35–45)
PCO2 BLDA: 34.4 MMHG (ref 35–45)
PCO2 BLDA: 34.5 MMHG (ref 35–45)
PCO2 BLDA: 36.8 MMHG (ref 35–45)
PCO2 BLDA: 38.2 MMHG (ref 35–45)
PCO2 BLDA: 39.7 MMHG (ref 35–45)
PCO2 BLDA: 41.7 MMHG (ref 35–45)
PCO2 BLDA: 42.4 MMHG (ref 35–45)
PCO2 BLDA: 43.3 MMHG (ref 35–45)
PCO2 BLDA: 51 MMHG (ref 35–45)
PEEP: 5
PH SMN: 7.32 [PH] (ref 7.35–7.45)
PH SMN: 7.34 [PH] (ref 7.35–7.45)
PH SMN: 7.34 [PH] (ref 7.35–7.45)
PH SMN: 7.42 [PH] (ref 7.35–7.45)
PH SMN: 7.43 [PH] (ref 7.35–7.45)
PH SMN: 7.43 [PH] (ref 7.35–7.45)
PH SMN: 7.44 [PH] (ref 7.35–7.45)
PH SMN: 7.45 [PH] (ref 7.35–7.45)
PH SMN: 7.47 [PH] (ref 7.35–7.45)
PH SMN: 7.48 [PH] (ref 7.35–7.45)
PH SMN: 7.49 [PH] (ref 7.35–7.45)
PHOSPHATE SERPL-MCNC: 2.2 MG/DL (ref 2.7–4.5)
PHOSPHATE SERPL-MCNC: 2.9 MG/DL (ref 2.7–4.5)
PIP: 17
PLATELET # BLD AUTO: 135 K/UL (ref 150–450)
PLATELET # BLD AUTO: 159 K/UL (ref 150–450)
PLATELET # BLD AUTO: 273 K/UL (ref 150–450)
PLATELET BLD QL SMEAR: ABNORMAL
PMV BLD AUTO: 10.1 FL (ref 9.2–12.9)
PMV BLD AUTO: 10.3 FL (ref 9.2–12.9)
PMV BLD AUTO: 9.7 FL (ref 9.2–12.9)
PO2 BLDA: 103 MMHG (ref 80–100)
PO2 BLDA: 128 MMHG (ref 80–100)
PO2 BLDA: 130 MMHG (ref 80–100)
PO2 BLDA: 146 MMHG (ref 80–100)
PO2 BLDA: 218 MMHG (ref 80–100)
PO2 BLDA: 232 MMHG (ref 80–100)
PO2 BLDA: 295 MMHG (ref 80–100)
PO2 BLDA: 314 MMHG (ref 80–100)
PO2 BLDA: 427 MMHG (ref 80–100)
PO2 BLDA: 43 MMHG (ref 40–60)
PO2 BLDA: 462 MMHG (ref 80–100)
POC BE: -2 MMOL/L
POC BE: -3 MMOL/L
POC BE: -3 MMOL/L
POC BE: 0 MMOL/L
POC BE: 2 MMOL/L
POC BE: 3 MMOL/L
POC IONIZED CALCIUM: 1.07 MMOL/L (ref 1.06–1.42)
POC IONIZED CALCIUM: 1.09 MMOL/L (ref 1.06–1.42)
POC IONIZED CALCIUM: 1.1 MMOL/L (ref 1.06–1.42)
POC IONIZED CALCIUM: 1.17 MMOL/L (ref 1.06–1.42)
POC IONIZED CALCIUM: 1.17 MMOL/L (ref 1.06–1.42)
POC IONIZED CALCIUM: 1.19 MMOL/L (ref 1.06–1.42)
POC IONIZED CALCIUM: 1.21 MMOL/L (ref 1.06–1.42)
POC IONIZED CALCIUM: 1.22 MMOL/L (ref 1.06–1.42)
POC SATURATED O2: 100 % (ref 95–100)
POC SATURATED O2: 80 % (ref 95–100)
POC SATURATED O2: 97 % (ref 95–100)
POC SATURATED O2: 99 % (ref 95–100)
POC TCO2: 22 MMOL/L (ref 23–27)
POC TCO2: 24 MMOL/L (ref 23–27)
POC TCO2: 24 MMOL/L (ref 23–27)
POC TCO2: 25 MMOL/L (ref 23–27)
POC TCO2: 26 MMOL/L (ref 23–27)
POC TCO2: 26 MMOL/L (ref 23–27)
POC TCO2: 27 MMOL/L (ref 23–27)
POC TCO2: 27 MMOL/L (ref 23–27)
POC TCO2: 27 MMOL/L (ref 24–29)
POC TCO2: 28 MMOL/L (ref 23–27)
POC TCO2: 28 MMOL/L (ref 23–27)
POCT GLUCOSE: 108 MG/DL (ref 70–110)
POCT GLUCOSE: 119 MG/DL (ref 70–110)
POCT GLUCOSE: 120 MG/DL (ref 70–110)
POCT GLUCOSE: 120 MG/DL (ref 70–110)
POCT GLUCOSE: 122 MG/DL (ref 70–110)
POCT GLUCOSE: 137 MG/DL (ref 70–110)
POCT GLUCOSE: 147 MG/DL (ref 70–110)
POCT GLUCOSE: 158 MG/DL (ref 70–110)
POCT GLUCOSE: 89 MG/DL (ref 70–110)
POLYCHROMASIA BLD QL SMEAR: ABNORMAL
POTASSIUM BLD-SCNC: 3.1 MMOL/L (ref 3.5–5.1)
POTASSIUM BLD-SCNC: 3.7 MMOL/L (ref 3.5–5.1)
POTASSIUM BLD-SCNC: 3.8 MMOL/L (ref 3.5–5.1)
POTASSIUM BLD-SCNC: 3.9 MMOL/L (ref 3.5–5.1)
POTASSIUM BLD-SCNC: 4 MMOL/L (ref 3.5–5.1)
POTASSIUM BLD-SCNC: 4.2 MMOL/L (ref 3.5–5.1)
POTASSIUM BLD-SCNC: 4.2 MMOL/L (ref 3.5–5.1)
POTASSIUM BLD-SCNC: 4.4 MMOL/L (ref 3.5–5.1)
POTASSIUM BLD-SCNC: 4.4 MMOL/L (ref 3.5–5.1)
POTASSIUM BLD-SCNC: 4.5 MMOL/L (ref 3.5–5.1)
POTASSIUM BLD-SCNC: 4.6 MMOL/L (ref 3.5–5.1)
POTASSIUM SERPL-SCNC: 3.9 MMOL/L (ref 3.5–5.1)
POTASSIUM SERPL-SCNC: 4.4 MMOL/L (ref 3.5–5.1)
PROT SERPL-MCNC: 3.8 G/DL (ref 6–8.4)
PROT SERPL-MCNC: 5.5 G/DL (ref 6–8.4)
PROTHROMBIN TIME: 13.2 SEC (ref 9–12.5)
PROTHROMBIN TIME: 17.6 SEC (ref 9–12.5)
RBC # BLD AUTO: 2.77 M/UL (ref 4.6–6.2)
RBC # BLD AUTO: 3.39 M/UL (ref 4.6–6.2)
RBC # BLD AUTO: 4.64 M/UL (ref 4.6–6.2)
SAMPLE: ABNORMAL
SAMPLE: NORMAL
SAMPLE: NORMAL
SITE: ABNORMAL
SODIUM BLD-SCNC: 139 MMOL/L (ref 136–145)
SODIUM BLD-SCNC: 139 MMOL/L (ref 136–145)
SODIUM BLD-SCNC: 140 MMOL/L (ref 136–145)
SODIUM BLD-SCNC: 141 MMOL/L (ref 136–145)
SODIUM BLD-SCNC: 142 MMOL/L (ref 136–145)
SODIUM BLD-SCNC: 142 MMOL/L (ref 136–145)
SODIUM BLD-SCNC: 143 MMOL/L (ref 136–145)
SODIUM BLD-SCNC: 144 MMOL/L (ref 136–145)
SODIUM SERPL-SCNC: 140 MMOL/L (ref 136–145)
SODIUM SERPL-SCNC: 141 MMOL/L (ref 136–145)
SP02: 100
UNIT NUMBER: NORMAL
VT: 450
WBC # BLD AUTO: 11.02 K/UL (ref 3.9–12.7)
WBC # BLD AUTO: 7.34 K/UL (ref 3.9–12.7)
WBC # BLD AUTO: 8.31 K/UL (ref 3.9–12.7)

## 2025-02-21 PROCEDURE — 84295 ASSAY OF SERUM SODIUM: CPT

## 2025-02-21 PROCEDURE — 36556 INSERT NON-TUNNEL CV CATH: CPT | Mod: 59,,, | Performed by: ANESTHESIOLOGY

## 2025-02-21 PROCEDURE — 63600175 PHARM REV CODE 636 W HCPCS: Mod: JZ,TB

## 2025-02-21 PROCEDURE — 84100 ASSAY OF PHOSPHORUS: CPT

## 2025-02-21 PROCEDURE — 85384 FIBRINOGEN ACTIVITY: CPT | Mod: 91 | Performed by: STUDENT IN AN ORGANIZED HEALTH CARE EDUCATION/TRAINING PROGRAM

## 2025-02-21 PROCEDURE — P9012 CRYOPRECIPITATE EACH UNIT: HCPCS

## 2025-02-21 PROCEDURE — 82330 ASSAY OF CALCIUM: CPT

## 2025-02-21 PROCEDURE — 25000003 PHARM REV CODE 250

## 2025-02-21 PROCEDURE — 83605 ASSAY OF LACTIC ACID: CPT | Mod: 91 | Performed by: STUDENT IN AN ORGANIZED HEALTH CARE EDUCATION/TRAINING PROGRAM

## 2025-02-21 PROCEDURE — 30233M1 TRANSFUSION OF NONAUTOLOGOUS PLASMA CRYOPRECIPITATE INTO PERIPHERAL VEIN, PERCUTANEOUS APPROACH: ICD-10-PCS | Performed by: THORACIC SURGERY (CARDIOTHORACIC VASCULAR SURGERY)

## 2025-02-21 PROCEDURE — 36415 COLL VENOUS BLD VENIPUNCTURE: CPT

## 2025-02-21 PROCEDURE — 86965 POOLING BLOOD PLATELETS: CPT

## 2025-02-21 PROCEDURE — 37799 UNLISTED PX VASCULAR SURGERY: CPT

## 2025-02-21 PROCEDURE — 27201423 OPTIME MED/SURG SUP & DEVICES STERILE SUPPLY: Performed by: THORACIC SURGERY (CARDIOTHORACIC VASCULAR SURGERY)

## 2025-02-21 PROCEDURE — 94761 N-INVAS EAR/PLS OXIMETRY MLT: CPT | Mod: XB

## 2025-02-21 PROCEDURE — 99900017 HC EXTUBATION W/PARAMETERS (STAT)

## 2025-02-21 PROCEDURE — P9012 CRYOPRECIPITATE EACH UNIT: HCPCS | Performed by: ANESTHESIOLOGY

## 2025-02-21 PROCEDURE — C1729 CATH, DRAINAGE: HCPCS | Performed by: THORACIC SURGERY (CARDIOTHORACIC VASCULAR SURGERY)

## 2025-02-21 PROCEDURE — 63600175 PHARM REV CODE 636 W HCPCS: Performed by: STUDENT IN AN ORGANIZED HEALTH CARE EDUCATION/TRAINING PROGRAM

## 2025-02-21 PROCEDURE — 94150 VITAL CAPACITY TEST: CPT

## 2025-02-21 PROCEDURE — 80053 COMPREHEN METABOLIC PANEL: CPT | Mod: 91 | Performed by: STUDENT IN AN ORGANIZED HEALTH CARE EDUCATION/TRAINING PROGRAM

## 2025-02-21 PROCEDURE — 36592 COLLECT BLOOD FROM PICC: CPT

## 2025-02-21 PROCEDURE — 84132 ASSAY OF SERUM POTASSIUM: CPT

## 2025-02-21 PROCEDURE — 27100026 HC SHUNT SENSOR, TERUMO

## 2025-02-21 PROCEDURE — D9220A PRA ANESTHESIA: Mod: ,,, | Performed by: ANESTHESIOLOGY

## 2025-02-21 PROCEDURE — 37000008 HC ANESTHESIA 1ST 15 MINUTES: Performed by: THORACIC SURGERY (CARDIOTHORACIC VASCULAR SURGERY)

## 2025-02-21 PROCEDURE — 5A1221Z PERFORMANCE OF CARDIAC OUTPUT, CONTINUOUS: ICD-10-PCS | Performed by: THORACIC SURGERY (CARDIOTHORACIC VASCULAR SURGERY)

## 2025-02-21 PROCEDURE — 99222 1ST HOSP IP/OBS MODERATE 55: CPT | Mod: ,,, | Performed by: NURSE PRACTITIONER

## 2025-02-21 PROCEDURE — 82803 BLOOD GASES ANY COMBINATION: CPT

## 2025-02-21 PROCEDURE — 85384 FIBRINOGEN ACTIVITY: CPT | Performed by: THORACIC SURGERY (CARDIOTHORACIC VASCULAR SURGERY)

## 2025-02-21 PROCEDURE — 94002 VENT MGMT INPAT INIT DAY: CPT

## 2025-02-21 PROCEDURE — 99900035 HC TECH TIME PER 15 MIN (STAT)

## 2025-02-21 PROCEDURE — 37000009 HC ANESTHESIA EA ADD 15 MINS: Performed by: THORACIC SURGERY (CARDIOTHORACIC VASCULAR SURGERY)

## 2025-02-21 PROCEDURE — 06BQ4ZZ EXCISION OF LEFT SAPHENOUS VEIN, PERCUTANEOUS ENDOSCOPIC APPROACH: ICD-10-PCS | Performed by: THORACIC SURGERY (CARDIOTHORACIC VASCULAR SURGERY)

## 2025-02-21 PROCEDURE — 63600175 PHARM REV CODE 636 W HCPCS

## 2025-02-21 PROCEDURE — 27000175 HC ADULT BYPASS PUMP

## 2025-02-21 PROCEDURE — 85002 BLEEDING TIME TEST: CPT

## 2025-02-21 PROCEDURE — 27100088 HC CELL SAVER

## 2025-02-21 PROCEDURE — 63600175 PHARM REV CODE 636 W HCPCS: Performed by: THORACIC SURGERY (CARDIOTHORACIC VASCULAR SURGERY)

## 2025-02-21 PROCEDURE — 76942 ECHO GUIDE FOR BIOPSY: CPT

## 2025-02-21 PROCEDURE — 021009W BYPASS CORONARY ARTERY, ONE ARTERY FROM AORTA WITH AUTOLOGOUS VENOUS TISSUE, OPEN APPROACH: ICD-10-PCS | Performed by: THORACIC SURGERY (CARDIOTHORACIC VASCULAR SURGERY)

## 2025-02-21 PROCEDURE — 83735 ASSAY OF MAGNESIUM: CPT | Mod: 91 | Performed by: STUDENT IN AN ORGANIZED HEALTH CARE EDUCATION/TRAINING PROGRAM

## 2025-02-21 PROCEDURE — 85014 HEMATOCRIT: CPT

## 2025-02-21 PROCEDURE — 83605 ASSAY OF LACTIC ACID: CPT

## 2025-02-21 PROCEDURE — 94010 BREATHING CAPACITY TEST: CPT

## 2025-02-21 PROCEDURE — 86965 POOLING BLOOD PLATELETS: CPT | Performed by: ANESTHESIOLOGY

## 2025-02-21 PROCEDURE — 36620 INSERTION CATHETER ARTERY: CPT | Mod: 59,,, | Performed by: ANESTHESIOLOGY

## 2025-02-21 PROCEDURE — P9045 ALBUMIN (HUMAN), 5%, 250 ML: HCPCS | Mod: JZ,TB

## 2025-02-21 PROCEDURE — 85027 COMPLETE CBC AUTOMATED: CPT | Performed by: STUDENT IN AN ORGANIZED HEALTH CARE EDUCATION/TRAINING PROGRAM

## 2025-02-21 PROCEDURE — 25000003 PHARM REV CODE 250: Performed by: ANESTHESIOLOGY

## 2025-02-21 PROCEDURE — 85730 THROMBOPLASTIN TIME PARTIAL: CPT | Performed by: STUDENT IN AN ORGANIZED HEALTH CARE EDUCATION/TRAINING PROGRAM

## 2025-02-21 PROCEDURE — P9045 ALBUMIN (HUMAN), 5%, 250 ML: HCPCS | Mod: JZ,TB | Performed by: THORACIC SURGERY (CARDIOTHORACIC VASCULAR SURGERY)

## 2025-02-21 PROCEDURE — 86920 COMPATIBILITY TEST SPIN: CPT | Performed by: NURSE PRACTITIONER

## 2025-02-21 PROCEDURE — 25000003 PHARM REV CODE 250: Performed by: STUDENT IN AN ORGANIZED HEALTH CARE EDUCATION/TRAINING PROGRAM

## 2025-02-21 PROCEDURE — 63600175 PHARM REV CODE 636 W HCPCS: Mod: JZ,TB | Performed by: THORACIC SURGERY (CARDIOTHORACIC VASCULAR SURGERY)

## 2025-02-21 PROCEDURE — 85025 COMPLETE CBC W/AUTO DIFF WBC: CPT

## 2025-02-21 PROCEDURE — 27201037 HC PRESSURE MONITORING SET UP

## 2025-02-21 PROCEDURE — 02100Z9 BYPASS CORONARY ARTERY, ONE ARTERY FROM LEFT INTERNAL MAMMARY, OPEN APPROACH: ICD-10-PCS | Performed by: THORACIC SURGERY (CARDIOTHORACIC VASCULAR SURGERY)

## 2025-02-21 PROCEDURE — 85025 COMPLETE CBC W/AUTO DIFF WBC: CPT | Mod: 91 | Performed by: THORACIC SURGERY (CARDIOTHORACIC VASCULAR SURGERY)

## 2025-02-21 PROCEDURE — 85610 PROTHROMBIN TIME: CPT | Mod: 91 | Performed by: STUDENT IN AN ORGANIZED HEALTH CARE EDUCATION/TRAINING PROGRAM

## 2025-02-21 PROCEDURE — 83735 ASSAY OF MAGNESIUM: CPT

## 2025-02-21 PROCEDURE — 86920 COMPATIBILITY TEST SPIN: CPT | Performed by: THORACIC SURGERY (CARDIOTHORACIC VASCULAR SURGERY)

## 2025-02-21 PROCEDURE — 27000191 HC C-V MONITORING

## 2025-02-21 PROCEDURE — 27000445 HC TEMPORARY PACEMAKER LEADS

## 2025-02-21 PROCEDURE — 27200953 HC CARDIOPLEGIA SYSTEM

## 2025-02-21 PROCEDURE — 36000712 HC OR TIME LEV V 1ST 15 MIN: Performed by: THORACIC SURGERY (CARDIOTHORACIC VASCULAR SURGERY)

## 2025-02-21 PROCEDURE — 27100171 HC OXYGEN HIGH FLOW UP TO 24 HOURS

## 2025-02-21 PROCEDURE — 25000003 PHARM REV CODE 250: Performed by: NURSE PRACTITIONER

## 2025-02-21 PROCEDURE — 82330 ASSAY OF CALCIUM: CPT | Performed by: STUDENT IN AN ORGANIZED HEALTH CARE EDUCATION/TRAINING PROGRAM

## 2025-02-21 PROCEDURE — 20000000 HC ICU ROOM

## 2025-02-21 PROCEDURE — 85610 PROTHROMBIN TIME: CPT | Performed by: THORACIC SURGERY (CARDIOTHORACIC VASCULAR SURGERY)

## 2025-02-21 PROCEDURE — 27202608 HC CANNULA, MISC

## 2025-02-21 PROCEDURE — 83605 ASSAY OF LACTIC ACID: CPT | Mod: 91

## 2025-02-21 PROCEDURE — 36000713 HC OR TIME LEV V EA ADD 15 MIN: Performed by: THORACIC SURGERY (CARDIOTHORACIC VASCULAR SURGERY)

## 2025-02-21 PROCEDURE — 84100 ASSAY OF PHOSPHORUS: CPT | Mod: 91 | Performed by: STUDENT IN AN ORGANIZED HEALTH CARE EDUCATION/TRAINING PROGRAM

## 2025-02-21 PROCEDURE — 63600175 PHARM REV CODE 636 W HCPCS: Performed by: ANESTHESIOLOGY

## 2025-02-21 PROCEDURE — 80053 COMPREHEN METABOLIC PANEL: CPT

## 2025-02-21 PROCEDURE — 85520 HEPARIN ASSAY: CPT

## 2025-02-21 RX ORDER — CEFAZOLIN 2 G/1
2 INJECTION, POWDER, FOR SOLUTION INTRAMUSCULAR; INTRAVENOUS
Status: DISCONTINUED | OUTPATIENT
Start: 2025-02-21 | End: 2025-02-21 | Stop reason: HOSPADM

## 2025-02-21 RX ORDER — MAGNESIUM SULFATE HEPTAHYDRATE 40 MG/ML
INJECTION, SOLUTION INTRAVENOUS
Status: DISCONTINUED | OUTPATIENT
Start: 2025-02-21 | End: 2025-02-21

## 2025-02-21 RX ORDER — SODIUM,POTASSIUM PHOSPHATES 280-250MG
2 POWDER IN PACKET (EA) ORAL ONCE
Status: DISCONTINUED | OUTPATIENT
Start: 2025-02-21 | End: 2025-02-21

## 2025-02-21 RX ORDER — TRANEXAMIC ACID 100 MG/ML
INJECTION, SOLUTION INTRAVENOUS
Status: DISCONTINUED | OUTPATIENT
Start: 2025-02-21 | End: 2025-02-21

## 2025-02-21 RX ORDER — PROPOFOL 10 MG/ML
INJECTION, EMULSION INTRAVENOUS
Status: DISPENSED
Start: 2025-02-21 | End: 2025-02-22

## 2025-02-21 RX ORDER — OXYCODONE HYDROCHLORIDE 10 MG/1
10 TABLET ORAL EVERY 4 HOURS PRN
Status: DISCONTINUED | OUTPATIENT
Start: 2025-02-21 | End: 2025-02-23

## 2025-02-21 RX ORDER — NICARDIPINE HYDROCHLORIDE 0.2 MG/ML
INJECTION INTRAVENOUS
Status: DISPENSED
Start: 2025-02-21 | End: 2025-02-22

## 2025-02-21 RX ORDER — LIDOCAINE HYDROCHLORIDE 20 MG/ML
INJECTION, SOLUTION EPIDURAL; INFILTRATION; INTRACAUDAL; PERINEURAL
Status: DISCONTINUED | OUTPATIENT
Start: 2025-02-21 | End: 2025-02-21

## 2025-02-21 RX ORDER — DOCUSATE SODIUM 100 MG/1
100 CAPSULE, LIQUID FILLED ORAL 2 TIMES DAILY
Status: DISCONTINUED | OUTPATIENT
Start: 2025-02-21 | End: 2025-02-24

## 2025-02-21 RX ORDER — POTASSIUM CHLORIDE 29.8 MG/ML
40 INJECTION INTRAVENOUS
Status: DISCONTINUED | OUTPATIENT
Start: 2025-02-21 | End: 2025-02-22

## 2025-02-21 RX ORDER — HEPARIN SODIUM 1000 [USP'U]/ML
INJECTION, SOLUTION INTRAVENOUS; SUBCUTANEOUS
Status: DISCONTINUED | OUTPATIENT
Start: 2025-02-21 | End: 2025-02-21

## 2025-02-21 RX ORDER — MIDAZOLAM HYDROCHLORIDE 1 MG/ML
INJECTION INTRAMUSCULAR; INTRAVENOUS
Status: DISCONTINUED | OUTPATIENT
Start: 2025-02-21 | End: 2025-02-21

## 2025-02-21 RX ORDER — NICARDIPINE HYDROCHLORIDE 0.2 MG/ML
0-15 INJECTION INTRAVENOUS CONTINUOUS
Status: DISCONTINUED | OUTPATIENT
Start: 2025-02-21 | End: 2025-02-22

## 2025-02-21 RX ORDER — MUPIROCIN 20 MG/G
OINTMENT TOPICAL 2 TIMES DAILY
Status: COMPLETED | OUTPATIENT
Start: 2025-02-21 | End: 2025-02-23

## 2025-02-21 RX ORDER — MUPIROCIN 20 MG/G
OINTMENT TOPICAL
Status: DISCONTINUED | OUTPATIENT
Start: 2025-02-21 | End: 2025-02-21 | Stop reason: HOSPADM

## 2025-02-21 RX ORDER — OXYCODONE HYDROCHLORIDE 5 MG/1
5 TABLET ORAL EVERY 4 HOURS PRN
Status: DISCONTINUED | OUTPATIENT
Start: 2025-02-21 | End: 2025-02-23

## 2025-02-21 RX ORDER — ALBUMIN HUMAN 50 G/1000ML
SOLUTION INTRAVENOUS
Status: COMPLETED
Start: 2025-02-21 | End: 2025-02-21

## 2025-02-21 RX ORDER — HEPARIN SOD,PORCINE/0.9 % NACL 1000/500ML
INTRAVENOUS SOLUTION INTRAVENOUS
Status: DISCONTINUED | OUTPATIENT
Start: 2025-02-21 | End: 2025-02-21 | Stop reason: HOSPADM

## 2025-02-21 RX ORDER — DEXAMETHASONE SODIUM PHOSPHATE 4 MG/ML
INJECTION, SOLUTION INTRA-ARTICULAR; INTRALESIONAL; INTRAMUSCULAR; INTRAVENOUS; SOFT TISSUE
Status: DISCONTINUED | OUTPATIENT
Start: 2025-02-21 | End: 2025-02-21

## 2025-02-21 RX ORDER — MEPERIDINE HYDROCHLORIDE 50 MG/ML
12.5 INJECTION INTRAMUSCULAR; INTRAVENOUS; SUBCUTANEOUS ONCE
Status: DISCONTINUED | OUTPATIENT
Start: 2025-02-21 | End: 2025-02-21

## 2025-02-21 RX ORDER — PROPOFOL 10 MG/ML
VIAL (ML) INTRAVENOUS
Status: DISCONTINUED | OUTPATIENT
Start: 2025-02-21 | End: 2025-02-21

## 2025-02-21 RX ORDER — BUPIVACAINE HYDROCHLORIDE 2.5 MG/ML
INJECTION, SOLUTION EPIDURAL; INFILTRATION; INTRACAUDAL
Status: COMPLETED | OUTPATIENT
Start: 2025-02-21 | End: 2025-02-21

## 2025-02-21 RX ORDER — ALBUMIN HUMAN 50 G/1000ML
25 SOLUTION INTRAVENOUS ONCE
Status: COMPLETED | OUTPATIENT
Start: 2025-02-21 | End: 2025-02-21

## 2025-02-21 RX ORDER — MEPERIDINE HYDROCHLORIDE 50 MG/ML
12.5 INJECTION INTRAMUSCULAR; INTRAVENOUS; SUBCUTANEOUS ONCE
Refills: 0 | Status: DISCONTINUED | OUTPATIENT
Start: 2025-02-21 | End: 2025-02-21

## 2025-02-21 RX ORDER — ONDANSETRON HYDROCHLORIDE 2 MG/ML
4 INJECTION, SOLUTION INTRAVENOUS EVERY 6 HOURS PRN
Status: DISCONTINUED | OUTPATIENT
Start: 2025-02-21 | End: 2025-02-25 | Stop reason: HOSPADM

## 2025-02-21 RX ORDER — CALCIUM GLUCONATE 20 MG/ML
1 INJECTION, SOLUTION INTRAVENOUS
Status: DISCONTINUED | OUTPATIENT
Start: 2025-02-21 | End: 2025-02-22

## 2025-02-21 RX ORDER — ROCURONIUM BROMIDE 10 MG/ML
INJECTION, SOLUTION INTRAVENOUS
Status: DISCONTINUED | OUTPATIENT
Start: 2025-02-21 | End: 2025-02-21

## 2025-02-21 RX ORDER — HYDROCODONE BITARTRATE AND ACETAMINOPHEN 500; 5 MG/1; MG/1
TABLET ORAL
Status: DISCONTINUED | OUTPATIENT
Start: 2025-02-21 | End: 2025-02-25 | Stop reason: HOSPADM

## 2025-02-21 RX ORDER — NOREPINEPHRINE BITARTRATE 1 MG/ML
INJECTION, SOLUTION INTRAVENOUS
Status: DISCONTINUED | OUTPATIENT
Start: 2025-02-21 | End: 2025-02-21

## 2025-02-21 RX ORDER — PAPAVERINE HYDROCHLORIDE 30 MG/ML
INJECTION INTRAMUSCULAR; INTRAVENOUS
Status: DISCONTINUED | OUTPATIENT
Start: 2025-02-21 | End: 2025-02-21 | Stop reason: HOSPADM

## 2025-02-21 RX ORDER — DEXMEDETOMIDINE HYDROCHLORIDE 4 UG/ML
INJECTION, SOLUTION INTRAVENOUS
Status: COMPLETED
Start: 2025-02-21 | End: 2025-02-21

## 2025-02-21 RX ORDER — FENTANYL CITRATE 50 UG/ML
INJECTION, SOLUTION INTRAMUSCULAR; INTRAVENOUS
Status: DISCONTINUED | OUTPATIENT
Start: 2025-02-21 | End: 2025-02-21

## 2025-02-21 RX ORDER — ASPIRIN 325 MG
325 TABLET, DELAYED RELEASE (ENTERIC COATED) ORAL DAILY
Status: DISCONTINUED | OUTPATIENT
Start: 2025-02-21 | End: 2025-02-22

## 2025-02-21 RX ORDER — PHENYLEPHRINE HCL IN 0.9% NACL 1 MG/10 ML
SYRINGE (ML) INTRAVENOUS
Status: DISCONTINUED | OUTPATIENT
Start: 2025-02-21 | End: 2025-02-21

## 2025-02-21 RX ORDER — CALCIUM GLUCONATE 20 MG/ML
1 INJECTION, SOLUTION INTRAVENOUS
Status: DISPENSED | OUTPATIENT
Start: 2025-02-21 | End: 2025-02-21

## 2025-02-21 RX ORDER — CEFAZOLIN SODIUM 1 G/3ML
INJECTION, POWDER, FOR SOLUTION INTRAMUSCULAR; INTRAVENOUS
Status: DISCONTINUED | OUTPATIENT
Start: 2025-02-21 | End: 2025-02-21

## 2025-02-21 RX ORDER — CALCIUM CHLORIDE DIHYDRATE 100 MG/ML
INJECTION, SOLUTION INTRAVENOUS
Status: DISCONTINUED | OUTPATIENT
Start: 2025-02-21 | End: 2025-02-21

## 2025-02-21 RX ORDER — MAGNESIUM SULFATE HEPTAHYDRATE 40 MG/ML
2 INJECTION, SOLUTION INTRAVENOUS
Status: DISCONTINUED | OUTPATIENT
Start: 2025-02-21 | End: 2025-02-22

## 2025-02-21 RX ORDER — FAMOTIDINE 10 MG/ML
20 INJECTION INTRAVENOUS 2 TIMES DAILY
Status: DISCONTINUED | OUTPATIENT
Start: 2025-02-21 | End: 2025-02-23

## 2025-02-21 RX ORDER — ACETAMINOPHEN 325 MG/1
650 TABLET ORAL EVERY 6 HOURS
Status: DISCONTINUED | OUTPATIENT
Start: 2025-02-21 | End: 2025-02-22

## 2025-02-21 RX ORDER — MEPERIDINE HYDROCHLORIDE 100 MG/ML
12.5 INJECTION INTRAMUSCULAR; INTRAVENOUS; SUBCUTANEOUS ONCE
Refills: 0 | Status: COMPLETED | OUTPATIENT
Start: 2025-02-21 | End: 2025-02-21

## 2025-02-21 RX ORDER — PROTAMINE SULFATE 10 MG/ML
INJECTION, SOLUTION INTRAVENOUS
Status: DISCONTINUED | OUTPATIENT
Start: 2025-02-21 | End: 2025-02-21

## 2025-02-21 RX ORDER — TAMSULOSIN HYDROCHLORIDE 0.4 MG/1
0.4 CAPSULE ORAL DAILY
Status: DISCONTINUED | OUTPATIENT
Start: 2025-02-22 | End: 2025-02-25 | Stop reason: HOSPADM

## 2025-02-21 RX ORDER — NITROGLYCERIN 5 MG/ML
INJECTION, SOLUTION INTRAVENOUS
Status: DISCONTINUED | OUTPATIENT
Start: 2025-02-21 | End: 2025-02-21

## 2025-02-21 RX ORDER — POTASSIUM CHLORIDE 14.9 MG/ML
20 INJECTION INTRAVENOUS
Status: DISCONTINUED | OUTPATIENT
Start: 2025-02-21 | End: 2025-02-22

## 2025-02-21 RX ORDER — CEFAZOLIN 2 G/1
2 INJECTION, POWDER, FOR SOLUTION INTRAMUSCULAR; INTRAVENOUS
Status: COMPLETED | OUTPATIENT
Start: 2025-02-21 | End: 2025-02-22

## 2025-02-21 RX ORDER — BUPIVACAINE HYDROCHLORIDE 5 MG/ML
INJECTION, SOLUTION EPIDURAL; INTRACAUDAL
Status: COMPLETED | OUTPATIENT
Start: 2025-02-21 | End: 2025-02-21

## 2025-02-21 RX ORDER — METOCLOPRAMIDE HYDROCHLORIDE 5 MG/ML
5 INJECTION INTRAMUSCULAR; INTRAVENOUS EVERY 6 HOURS PRN
Status: DISCONTINUED | OUTPATIENT
Start: 2025-02-21 | End: 2025-02-25 | Stop reason: HOSPADM

## 2025-02-21 RX ORDER — ESMOLOL HYDROCHLORIDE 10 MG/ML
INJECTION INTRAVENOUS
Status: DISCONTINUED | OUTPATIENT
Start: 2025-02-21 | End: 2025-02-21

## 2025-02-21 RX ORDER — DEXMEDETOMIDINE HYDROCHLORIDE 4 UG/ML
0-1.4 INJECTION, SOLUTION INTRAVENOUS CONTINUOUS
Status: DISCONTINUED | OUTPATIENT
Start: 2025-02-21 | End: 2025-02-21

## 2025-02-21 RX ORDER — TRANEXAMIC ACID 100 MG/ML
INJECTION, SOLUTION INTRAVENOUS CONTINUOUS PRN
Status: DISCONTINUED | OUTPATIENT
Start: 2025-02-21 | End: 2025-02-21

## 2025-02-21 RX ORDER — PROPOFOL 10 MG/ML
0-50 INJECTION, EMULSION INTRAVENOUS CONTINUOUS
Status: DISCONTINUED | OUTPATIENT
Start: 2025-02-21 | End: 2025-02-21

## 2025-02-21 RX ORDER — CALCIUM GLUCONATE 20 MG/ML
1 INJECTION, SOLUTION INTRAVENOUS
Status: DISCONTINUED | OUTPATIENT
Start: 2025-02-21 | End: 2025-02-21

## 2025-02-21 RX ORDER — POLYETHYLENE GLYCOL 3350 17 G/17G
17 POWDER, FOR SOLUTION ORAL DAILY
Status: DISCONTINUED | OUTPATIENT
Start: 2025-02-21 | End: 2025-02-25 | Stop reason: HOSPADM

## 2025-02-21 RX ADMIN — FENTANYL CITRATE 150 MCG: 50 INJECTION INTRAMUSCULAR; INTRAVENOUS at 10:02

## 2025-02-21 RX ADMIN — ROCURONIUM BROMIDE 100 MG: 10 INJECTION INTRAVENOUS at 09:02

## 2025-02-21 RX ADMIN — CEFAZOLIN 2 G: 2 INJECTION, POWDER, FOR SOLUTION INTRAMUSCULAR; INTRAVENOUS at 06:02

## 2025-02-21 RX ADMIN — CALCIUM CHLORIDE 500 MG: 100 INJECTION, SOLUTION INTRAVENOUS at 03:02

## 2025-02-21 RX ADMIN — PROPOFOL 150 MG: 10 INJECTION, EMULSION INTRAVENOUS at 09:02

## 2025-02-21 RX ADMIN — MIDAZOLAM 4 MG: 1 INJECTION INTRAMUSCULAR; INTRAVENOUS at 09:02

## 2025-02-21 RX ADMIN — MUPIROCIN: 20 OINTMENT TOPICAL at 09:02

## 2025-02-21 RX ADMIN — PROPOFOL 50 MCG/KG/MIN: 10 INJECTION, EMULSION INTRAVENOUS at 02:02

## 2025-02-21 RX ADMIN — DEXMEDETOMIDINE HYDROCHLORIDE 0.4 MCG/KG/HR: 4 INJECTION, SOLUTION INTRAVENOUS at 04:02

## 2025-02-21 RX ADMIN — HEPARIN SODIUM 31000 UNITS: 1000 INJECTION, SOLUTION INTRAVENOUS; SUBCUTANEOUS at 12:02

## 2025-02-21 RX ADMIN — FAMOTIDINE 20 MG: 10 INJECTION, SOLUTION INTRAVENOUS at 08:02

## 2025-02-21 RX ADMIN — MAGNESIUM SULFATE 1 G: 2 INJECTION INTRAVENOUS at 01:02

## 2025-02-21 RX ADMIN — LIDOCAINE HYDROCHLORIDE 100 MG: 20 INJECTION, SOLUTION EPIDURAL; INFILTRATION; INTRACAUDAL at 09:02

## 2025-02-21 RX ADMIN — CEFAZOLIN 2 G: 330 INJECTION, POWDER, FOR SOLUTION INTRAMUSCULAR; INTRAVENOUS at 10:02

## 2025-02-21 RX ADMIN — ALBUMIN HUMAN 25 G: 50 SOLUTION INTRAVENOUS at 04:02

## 2025-02-21 RX ADMIN — LAMOTRIGINE 200 MG: 100 TABLET ORAL at 04:02

## 2025-02-21 RX ADMIN — MUPIROCIN: 20 OINTMENT TOPICAL at 07:02

## 2025-02-21 RX ADMIN — LIDOCAINE HYDROCHLORIDE 100 MG: 20 INJECTION, SOLUTION EPIDURAL; INFILTRATION; INTRACAUDAL at 01:02

## 2025-02-21 RX ADMIN — ASPIRIN 325 MG: 325 TABLET, COATED ORAL at 06:02

## 2025-02-21 RX ADMIN — DEXMEDETOMIDINE HYDROCHLORIDE 0.25 MCG/KG/HR: 100 INJECTION, SOLUTION, CONCENTRATE INTRAVENOUS at 09:02

## 2025-02-21 RX ADMIN — INSULIN HUMAN 1 UNITS/HR: 1 INJECTION, SOLUTION INTRAVENOUS at 07:02

## 2025-02-21 RX ADMIN — SODIUM CHLORIDE: 9 INJECTION, SOLUTION INTRAVENOUS at 09:02

## 2025-02-21 RX ADMIN — METHADONE HYDROCHLORIDE 14 MG: 10 INJECTION, SOLUTION INTRAMUSCULAR; INTRAVENOUS; SUBCUTANEOUS at 09:02

## 2025-02-21 RX ADMIN — PROPOFOL 40 MG: 10 INJECTION, EMULSION INTRAVENOUS at 12:02

## 2025-02-21 RX ADMIN — SODIUM CHLORIDE, SODIUM GLUCONATE, SODIUM ACETATE, POTASSIUM CHLORIDE, MAGNESIUM CHLORIDE, SODIUM PHOSPHATE, DIBASIC, AND POTASSIUM PHOSPHATE: .53; .5; .37; .037; .03; .012; .00082 INJECTION, SOLUTION INTRAVENOUS at 12:02

## 2025-02-21 RX ADMIN — PROPOFOL 30 MG: 10 INJECTION, EMULSION INTRAVENOUS at 03:02

## 2025-02-21 RX ADMIN — CALCIUM CHLORIDE 1 G: 100 INJECTION, SOLUTION INTRAVENOUS at 10:02

## 2025-02-21 RX ADMIN — NITROGLYCERIN 200 MCG: 5 INJECTION, SOLUTION INTRAVENOUS at 12:02

## 2025-02-21 RX ADMIN — MEPERIDINE HYDROCHLORIDE 12.5 MG: 100 INJECTION INTRAMUSCULAR; INTRAVENOUS; SUBCUTANEOUS at 08:02

## 2025-02-21 RX ADMIN — DEXAMETHASONE SODIUM PHOSPHATE 4 MG: 4 INJECTION INTRA-ARTICULAR; INTRALESIONAL; INTRAMUSCULAR; INTRAVENOUS; SOFT TISSUE at 10:02

## 2025-02-21 RX ADMIN — TRANEXAMIC ACID 500 MG: 100 INJECTION INTRAVENOUS at 10:02

## 2025-02-21 RX ADMIN — BUPIVACAINE HYDROCHLORIDE 30 ML: 5 INJECTION, SOLUTION EPIDURAL; INTRACAUDAL; PERINEURAL at 09:02

## 2025-02-21 RX ADMIN — ALBUMIN (HUMAN) 25 G: 12.5 SOLUTION INTRAVENOUS at 05:02

## 2025-02-21 RX ADMIN — EPINEPHRINE 0.06 MCG/KG/MIN: 1 INJECTION INTRAMUSCULAR; INTRAVENOUS; SUBCUTANEOUS at 01:02

## 2025-02-21 RX ADMIN — CALCIUM CHLORIDE 0.5 G: 100 INJECTION, SOLUTION INTRAVENOUS at 01:02

## 2025-02-21 RX ADMIN — FENTANYL CITRATE 100 MCG: 50 INJECTION INTRAMUSCULAR; INTRAVENOUS at 10:02

## 2025-02-21 RX ADMIN — NOREPINEPHRINE BITARTRATE 8 MCG: 1 INJECTION, SOLUTION, CONCENTRATE INTRAVENOUS at 11:02

## 2025-02-21 RX ADMIN — ACETAMINOPHEN 650 MG: 325 TABLET ORAL at 09:02

## 2025-02-21 RX ADMIN — NOREPINEPHRINE BITARTRATE 0.02 MCG/KG/MIN: 1 INJECTION, SOLUTION, CONCENTRATE INTRAVENOUS at 10:02

## 2025-02-21 RX ADMIN — ROCURONIUM BROMIDE 50 MG: 10 INJECTION INTRAVENOUS at 10:02

## 2025-02-21 RX ADMIN — ESMOLOL HYDROCHLORIDE 50 MG: 100 INJECTION, SOLUTION INTRAVENOUS at 09:02

## 2025-02-21 RX ADMIN — NOREPINEPHRINE BITARTRATE 8 MCG: 1 INJECTION, SOLUTION, CONCENTRATE INTRAVENOUS at 03:02

## 2025-02-21 RX ADMIN — SODIUM CHLORIDE, SODIUM GLUCONATE, SODIUM ACETATE, POTASSIUM CHLORIDE, MAGNESIUM CHLORIDE, SODIUM PHOSPHATE, DIBASIC, AND POTASSIUM PHOSPHATE: .53; .5; .37; .037; .03; .012; .00082 INJECTION, SOLUTION INTRAVENOUS at 09:02

## 2025-02-21 RX ADMIN — FENTANYL CITRATE 250 MCG: 50 INJECTION INTRAMUSCULAR; INTRAVENOUS at 09:02

## 2025-02-21 RX ADMIN — DOCUSATE SODIUM 100 MG: 100 CAPSULE, LIQUID FILLED ORAL at 09:02

## 2025-02-21 RX ADMIN — ROCURONIUM BROMIDE 50 MG: 10 INJECTION INTRAVENOUS at 12:02

## 2025-02-21 RX ADMIN — Medication 100 MCG: at 02:02

## 2025-02-21 RX ADMIN — ALBUMIN (HUMAN) 25 G: 12.5 SOLUTION INTRAVENOUS at 04:02

## 2025-02-21 RX ADMIN — DEXMEDETOMIDINE HYDROCHLORIDE 0.4 MCG/KG/HR: 4 INJECTION INTRAVENOUS at 04:02

## 2025-02-21 RX ADMIN — PROTAMINE SULFATE 300 MG: 10 INJECTION, SOLUTION INTRAVENOUS at 01:02

## 2025-02-21 RX ADMIN — ROCURONIUM BROMIDE 50 MG: 10 INJECTION INTRAVENOUS at 01:02

## 2025-02-21 RX ADMIN — LORAZEPAM 0.5 MG: 0.5 TABLET ORAL at 09:02

## 2025-02-21 RX ADMIN — POTASSIUM CHLORIDE 20 MEQ: 14.9 INJECTION, SOLUTION INTRAVENOUS at 05:02

## 2025-02-21 RX ADMIN — TRANEXAMIC ACID 1 MG/KG/HR: 100 INJECTION INTRAVENOUS at 09:02

## 2025-02-21 RX ADMIN — BUPIVACAINE HYDROCHLORIDE 20 ML: 2.5 INJECTION, SOLUTION EPIDURAL; INFILTRATION; INTRACAUDAL; PERINEURAL at 09:02

## 2025-02-21 RX ADMIN — PROPOFOL 50 MG: 10 INJECTION, EMULSION INTRAVENOUS at 10:02

## 2025-02-21 RX ADMIN — NOREPINEPHRINE BITARTRATE 8 MCG: 1 INJECTION, SOLUTION, CONCENTRATE INTRAVENOUS at 10:02

## 2025-02-21 RX ADMIN — SODIUM PHOSPHATE, MONOBASIC, MONOHYDRATE AND SODIUM PHOSPHATE, DIBASIC, ANHYDROUS 20.1 MMOL: 142; 276 INJECTION, SOLUTION INTRAVENOUS at 07:02

## 2025-02-21 NOTE — PROGRESS NOTES
[facility] - Surgery ([facility])  [facility] Medicine  Progress Note    Patient Name: Ye Partida  MRN: [id number]  Patient Class: IP- Inpatient   Admission Date: 2/19/2025  Length of Stay: 1 days  Attending Physician: King Spencer, *  Primary Care Provider: Cameron Tran MD        Subjective     Principal Problem:Chest pain        HPI:  Mr. Ye Partida is a 69 year old male with a PMHx of CAD, HTN, seizure disorder, and peripheral neuropathy who [place] [facility]  [place] chest pain for 3 days. Stress test done for a preop evaluation to be a kidney donor was positive. Stress echo revealed EF 50-55%, EKG revealed ST seg depression in inferolateral leads (II, III, aVF, V5 and V6), post stress EF reduced to 43%. Patient states that 5 days ago he began to notice sharp left sided pain that was intermittent and occurred in the morning, then the past 2 days including this morning, noticed anterior chest heaviness that lasted 45 minutes, which self resolved without nitroglycerin or pain medication. Pain was occurring at rest in the morning.     Pt denies chest pain, sob, cough, sore throat, abdominal pain, n/v/d, constipation, fever, chills, headache, numbness or tingling or weakness of the extremities     ED Course:  In the ED, patient AF, HR 75, RR 20, /92. Initial labs CBC, CMP unremarkable, BNP 78, and negative troponin. EKG showed NSR Qtc 385. CXR without acute cardiopulmonary process. Interventional cardiology consulted, planning for LHC +/- PCI afternoon of admission. Admitted to hospital medicine for further evaluation     Overview/Hospital Course:  Patient CAD and seizure disorder who pw intermittent chest pain after outpatient stress echo done for preop evaluation revealed lateral ischemic changes. [facility] with 3 vessel disease now awaiting CABG.    Interval History: Patient had CABG with CTS today. Now s/p CABG in SICU. Tolerated procedure well.     Review of Systems   Unable to perform ROS:  Acuity of condition     Objective:     Vital Signs (Most Recent):  Temp: 97.5 °F (36.4 °C) (02/21/25 1528)  Pulse: (!) 56 (02/21/25 1535)  Resp: (!) 24 (02/21/25 1535)  BP: 101/64 (02/21/25 1530)  SpO2: 100 % (02/21/25 1535) Vital Signs (24h Range):  Temp:  [97.4 °F (36.3 °C)-98.2 °F (36.8 °C)] 97.5 °F (36.4 °C)  Pulse:  [55-70] 56  Resp:  [16-24] 24  SpO2:  [97 %-100 %] 100 %  BP: (101-168)/(64-86) 101/64  Arterial Line BP: (103)/(58) 103/58     Weight: 61.6 kg (135 lb 12.9 oz)  Body mass index is 20.65 kg/m².    Intake/Output Summary (Last 24 hours) at 2/21/2025 1602  Last data filed at 2/21/2025 1528  Gross per 24 hour   Intake 4595 ml   Output 1800 ml   Net 2795 ml         Physical Exam  Constitutional:       Appearance: Normal appearance.      Comments: Post surgery   HENT:      Head: Normocephalic and atraumatic.      Nose: Nose normal.   Cardiovascular:      Rate and Rhythm: Normal rate and regular rhythm.      Pulses: Normal pulses.      Comments: Patient with A-line  Pulmonary:      Comments: Intubated  Abdominal:      General: Abdomen is flat.      Palpations: Abdomen is soft.   Musculoskeletal:         General: Normal range of motion.      Right lower leg: No edema.      Left lower leg: No edema.   Skin:     General: Skin is warm and dry.      Comments: Large post surgical scar on anterior chest   Neurological:      Mental Status: He is alert.      Comments: Sedated.             Significant Labs: All pertinent labs within the past 24 hours have been reviewed.    Significant Imaging: I have reviewed all pertinent imaging results/findings within the past 24 hours.    Assessment and Plan     * Chest pain  70 yo M CAD, HTN, seizure disorder, and peripheral neuropathy who presents to [facility]  after having chest pain for 3 days. Stress test done for a preop evaluation to be a kidney donor was positive. Stress echo revealed EF 50-55%, EKG revealed ST seg depression in inferolateral leads (II, III, aVF, V5 and V6),  post stress EF reduced to 43%. Patient states that 5 days ago he began to notice sharp left sided pain that was intermittent and occurred in the morning, then the past 2 days including this morning, noticed anterior chest heaviness that lasted 45 minutes, which self resolved without nitroglycerin or pain medication. Pain was occurring at rest in the morning. Pt currently denies chest pain, sob, cough, sore throat, abdominal pain, n/v/d, constipation, fever, chills, headache, numbness or tingling or weakness of the extremities In the ED, patient AF, HR 75, RR 20, /92. Initial labs CBC, CMP unremarkable, BNP 78, and negative troponin. EKG showed NSR Qtc 385. CXR without acute cardiopulmonary process.     Interventional cardiology consulted, had LHC showing 3 vessel disease. Patient evaluated by CTS and is scheduled for CABG    - CABG today, plan per SICU  - continue telemetry  - maintain Mg >2, K>4, P >3     CAD (coronary artery disease)  Patient with known CAD, which is uncontrolled Will continue ASA and Statin and monitor for S/Sx of angina/ACS. Continue to monitor on telemetry.     Primary hypertension  Patient's blood pressure range in the last 24 hours was: BP  Min: 126/78  Max: 168/86.The patient's inpatient anti-hypertensive regimen is listed below:    Plan  - BP is controlled, no changes needed to their regimen      Mixed hyperlipidemia  -Started on statin      Generalized convulsive epilepsy with intractable epilepsy  - resumed home lamotridine 200mg QD   - resumed home ativan 0.5 mg BID         VTE Risk Mitigation (From admission, onward)           Ordered     heparin infusion 1,000 units/500 ml in 0.9% NaCl (on sterile field)  As needed (PRN)         02/21/25 1109     IP VTE LOW RISK PATIENT  Once         02/21/25 0736     heparin 25,000 units in dextrose 5% (100 units/ml) IV bolus from bag LOW INTENSITY nomogram - [facility]  As needed (PRN)        Question:  Heparin Infusion Adjustment (DO NOT MODIFY  ANSWER)  Answer:  [email or web address]\Images\Pharmacy\HeparinInfusions\heparin LOW INTENSITY nomogram for [facility] [id number].pdf    02/19/25 1701     heparin 25,000 units in dextrose 5% (100 units/ml) IV bolus from bag LOW INTENSITY nomogram - [facility]  As needed (PRN)        Question:  Heparin Infusion Adjustment (DO NOT MODIFY ANSWER)  Answer:  [email or web address]\epic\Images\Pharmacy\HeparinInfusions\heparin LOW INTENSITY nomogram for [facility] [id number].pdf    02/19/25 1701     heparin 25,000 units in dextrose 5% 250 mL (100 units/mL) infusion LOW INTENSITY nomogram - [facility]  Continuous        Question:  Begin at (units/kg/hr)  Answer:  12    02/19/25 1701     Place sequential compression device  Until discontinued         02/19/25 1430                    Discharge Planning   KIRTI: 2/26/2025     Code Status: Full Code   Medical Readiness for Discharge Date:                            Jono Cotto DO  Department of Hospital Medicine   [facility] - Surgery ([facility])

## 2025-02-21 NOTE — ANESTHESIA PROCEDURE NOTES
BL external oblique blocks    Patient location during procedure: pre-op   Block not for primary anesthetic.  Reason for block: at surgeon's request and post-op pain management   Post-op Pain Location: chest      Staffing  Authorizing Provider: Tulio Manuel MD  Performing Provider: Franchesca Akers MD    Staffing  Performed by: Franchesca Akers MD  Authorized by: Tulio Manuel MD    Preanesthetic Checklist  Completed: patient identified, IV checked, site marked, risks and benefits discussed, surgical consent, monitors and equipment checked, pre-op evaluation and timeout performed  Peripheral Block  Patient position: supine  Prep: ChloraPrep  Patient monitoring: heart rate, cardiac monitor, continuous pulse ox, continuous capnometry and frequent blood pressure checks  Block type: Other (external oblique)  Laterality: bilateral  Injection technique: single shot  Needle  Needle type: Stimuplex   Needle gauge: 21 G  Needle length: 4 in  Needle localization: anatomical landmarks and ultrasound guidance   -ultrasound image captured on disc.  Assessment  Injection assessment: negative aspiration, negative parasthesia and local visualized surrounding nerve  Paresthesia pain: none  Heart rate change: no  Slow fractionated injection: yes  Pain Tolerance: comfortable throughout block  Medications:    Medications: bupivacaine (pf) (MARCAINE) injection 0.25% - Perineural   20 mL - 2/21/2025 9:45:00 AM    Additional Notes    VSS.  [facility] RN monitoring vitals throughout procedure.  Patient tolerated procedure well.

## 2025-02-21 NOTE — HPI
Reason for Consult: Management of Hyperglycemia     Surgical Procedure and Date: s/p CABG x2 on 02/21/2025    Patient is not diabetic and does not currently take any oral/injectable antidiabetic/hypoglycemic medications.   Lab Results   Component Value Date    HGBA1C 5.2 01/29/2025         HPI: Mr. Ye Partida is a 69M with a PMHx of CAD, HTN, seizure disorder, and peripheral neuropathy who [place] [facility] [place] having chest pain for 3 days. The patient presents to the SICU s/p CABG x2 with Dr. Leal on 02/21/2025. On admission, they are intubated, sedated with propofol and precedex, and in stable condition. Endocrine consulted to manage hyperglycemia in the context of CTS.

## 2025-02-21 NOTE — ANESTHESIA PROCEDURE NOTES
Intubation    Date/Time: 2/21/2025 9:43 AM    Performed by: Franchesca Akers MD  Authorized by: Tulio Manuel MD    Intubation:     Induction:  Intravenous    Intubated:  Postinduction    Mask Ventilation:  Easy mask    Attempts:  1    Attempted By:  Resident anesthesiologist    Method of Intubation:  Direct    Blade:  Morales 3    Laryngeal View Grade: Grade I - full view of cords      Difficult Airway Encountered?: No      Complications:  None    Airway Device:  Oral endotracheal tube    Airway Device Size:  7.5    Style/Cuff Inflation:  Cuffed (inflated to minimal occlusive pressure)    Tube secured:  22    Secured at:  The teeth    Placement Verified By:  Capnometry    Complicating Factors:  None    Findings Post-Intubation:  BS equal bilateral and atraumatic/condition of teeth unchanged

## 2025-02-21 NOTE — ASSESSMENT & PLAN NOTE
Problem: Occupational Therapy  Goal: Occupational Therapy Goal  Description: Goals to be met by: 12/15/22     Patient will increase functional independence with ADLs by performing:    Feeding with Supervision.  UE Dressing with Moderate Assistance.  LE Dressing with Moderate Assistance.  Grooming while seated with Minimal Assistance.  Toilet transfer to bedside commode with Minimal Assistance.  Upper extremity exercise program x10 reps per handout, with assistance as needed.    Outcome: Ongoing, Progressing   The patient will benefit from SNF   -Started on statin

## 2025-02-21 NOTE — PLAN OF CARE
Patient lying in bed. Resp even and unlabored. No c/o pain or discomfort. Take meds whole. Skin dry and warm to touch. Heparin 12 units gtts. Call light in reach. Will monitor.     Problem: Adult Inpatient Plan of Care  Goal: Plan of Care Review  Outcome: Progressing  Goal: Patient-Specific Goal (Individualized)  Outcome: Progressing  Goal: Absence of Hospital-Acquired Illness or Injury  Outcome: Progressing  Goal: Optimal Comfort and Wellbeing  Outcome: Progressing  Goal: Readiness for Transition of Care  Outcome: Progressing

## 2025-02-21 NOTE — PLAN OF CARE
Pre-op assessment completed. Patient and family verbalized understanding of plan of care. Call bell within reach. Family at the bedside.

## 2025-02-21 NOTE — NURSING
Pt taken to surgery. Pt AAOx4. Pt stable. No complaints of pain or signs of distress. Left per wheelchair with transport. Telebox

## 2025-02-21 NOTE — LETTER
February 21, 2025    Ye Partida  [address]  [place] [address]             [facility]  [address] [facility]  [place] [address]  Phone: [phone]                                  [facility]   [address] [facility]   [place] [address]  [phone]         [facility]   [address] [place] [address] [phone]         [facility]    [address] [address]  [place] [address] [phone]   Re: Ye Partida  Clinic number: [id number]    Dear Mr. Partida:    You were recently admitted to an [facility] facility for cardiac (heart) problem.  Your physician has referred you to [facility].  Cardiac Rehab Phase 2 is an educational and exercise program, conducted in a outpatient setting, proven to help reduce your risk for recurrent heart events.    Cardiac rehab has two major parts:    1. Exercise training to help you achieve cardiovascular fitness while learning how to exercise safely and improve muscle strength and endurance.  Your exercise prescription will be based on the results of the cardiopulmonary stress test (CPX) which will be done before entering the program and at completion.  2. Education, counseling and training to help you understand your heart condition and find ways to reduce your risk of future heart problems.  The cardiac rehab team will help you learn how to cope with the stress of adjusting to a new lifestyle and to deal with your fears about the future.    Phase 2 is a 36-session program, meeting 3 times a week for 12 weeks.  Each session consists of an hour of exercise and half-hour dedicated to the educational topic of the day.  Class days vary per location.  Please contact your nearest facility for details.    Through cardiac rehab you will learn:  About your heart condition, medical therapies, and medication  Risk factors in y our lifestyle  contributing to heart disease  New strategies to modify your risk factors  About a healthy diet that can lower your blood cholesterol, control weight, help prevent or control high blood pressure, and diabetes  How to stop smoking  How to manage stress    If you are interested in getting started, call the [facility] of your choosing.     Sincerely,     [facility]

## 2025-02-21 NOTE — SUBJECTIVE & OBJECTIVE
Interval History: Patient had CABG with CTS today. Now s/p CABG in SICU. Tolerated procedure well.     Review of Systems   Unable to perform ROS: Acuity of condition     Objective:     Vital Signs (Most Recent):  Temp: 97.5 °F (36.4 °C) (02/21/25 1528)  Pulse: (!) 56 (02/21/25 1535)  Resp: (!) 24 (02/21/25 1535)  BP: 101/64 (02/21/25 1530)  SpO2: 100 % (02/21/25 1535) Vital Signs (24h Range):  Temp:  [97.4 °F (36.3 °C)-98.2 °F (36.8 °C)] 97.5 °F (36.4 °C)  Pulse:  [55-70] 56  Resp:  [16-24] 24  SpO2:  [97 %-100 %] 100 %  BP: (101-168)/(64-86) 101/64  Arterial Line BP: (103)/(58) 103/58     Weight: 61.6 kg (135 lb 12.9 oz)  Body mass index is 20.65 kg/m².    Intake/Output Summary (Last 24 hours) at 2/21/2025 1602  Last data filed at 2/21/2025 1528  Gross per 24 hour   Intake 4595 ml   Output 1800 ml   Net 2795 ml         Physical Exam  Constitutional:       Appearance: Normal appearance.      Comments: Post surgery   HENT:      Head: Normocephalic and atraumatic.      Nose: Nose normal.   Cardiovascular:      Rate and Rhythm: Normal rate and regular rhythm.      Pulses: Normal pulses.      Comments: Patient with A-line  Pulmonary:      Comments: Intubated  Abdominal:      General: Abdomen is flat.      Palpations: Abdomen is soft.   Musculoskeletal:         General: Normal range of motion.      Right lower leg: No edema.      Left lower leg: No edema.   Skin:     General: Skin is warm and dry.      Comments: Large post surgical scar on anterior chest   Neurological:      Mental Status: He is alert.      Comments: Sedated.             Significant Labs: All pertinent labs within the past 24 hours have been reviewed.    Significant Imaging: I have reviewed all pertinent imaging results/findings within the past 24 hours.

## 2025-02-21 NOTE — SUBJECTIVE & OBJECTIVE
Interval HPI:   Overnight events: Patient in room 32385 TCVICU/63504 TCVIC*. Blood glucose stable. BG at goal on current insulin regimen (IIP). Steroid use- Dexamethasone  4 mg. * Day of Surgery *  Renal function- Normal   Vasopressors-  None       Endocrine will continue to follow and manage insulin orders inpatient.         Diet NPO Except for: Medication, Sips with Medication     Eating:   NPO  Nausea: No  Hypoglycemia and intervention: No  Fever: No  TPN and/or TF: No    PMH, PSH, FH, SH updated and reviewed     ROS:  Review of Systems  [place] due to intubation and sedation.     Current Medications and/or Treatments Impacting Glycemic Control  Immunotherapy:    Immunosuppressants       None          Steroids:   Hormones (From admission, onward)      Start     Stop Route Frequency Ordered    02/19/25 1530  melatonin tablet 6 mg         -- Oral Nightly PRN 02/19/25 1430          Pressors:    Autonomic Drugs (From admission, onward)      None          Hyperglycemia/Diabetes Medications:   Antihyperglycemics (From admission, onward)      Start     Stop Route Frequency Ordered    02/21/25 1615  insulin regular in 0.9 % NaCl 100 unit/100 mL (1 unit/mL) infusion        Question Answer Comment   Insulin rate changes (DO NOT MODIFY ANSWER) \\[email or web address] [email or web address]    Enter initial dose (Units/hr): 1        -- IV Continuous 02/21/25 1529             PHYSICAL EXAMINATION:  Vitals:    02/21/25 1638   BP:    Pulse: (!) 57   Resp: 17   Temp:      Body mass index is 20.65 kg/m².     Physical Exam   Constitutional: Well developed, obese, NAD.  ENT: External ears no masses with nose patent  Neck: Supple; trachea midline  Cardiovascular: Normal heart sounds, no LE edema. DP +2 bilaterally.  Lungs: Normal effort; lungs anterior bilaterally clear to auscultation.  Intubated on a ventilator.  Abdomen: Soft, no masses, no hernias.  Hypoactive BS noted.  MS: No clubbing or cyanosis of  nails noted; unable to assess gait.  Skin: No rashes, lesions, or ulcers; no nodules.  Injection sites are ok. No lipo hypertropthy or atrophy.  Mid-sternal incision with telfa island dressing, CDI.  CT x 2.  LLE wrapped with ACE wrap.  Psychiatric: AMERICA  Neurological: [place]  Foot: Nails in good condition, no amputations noted

## 2025-02-21 NOTE — HPI
Mr. Ye Partida is a 69M with a PMHx of CAD, HTN, seizure disorder, and peripheral neuropathy who [place] [facility] [place] for 3 days. Stress test done for a preop evaluation to be a kidney donor was positive. Stress echo revealed EF 50-55%, EKG revealed ST seg depression in inferolateral leads (II, III, aVF, V5 and V6), post stress EF reduced to 43%. Interventional cardiology consulted and patient underwent cardiac cath 2/19.     The patient presents to the SICU s/p CABG x2 with Dr. Leal on 02/21/2025. On admission, they are intubated, sedated with propofol and precedex, and in stable condition. No inotropic and vasopressor requirements upon admission to maintain MAP 65-85. Central access includes a L subclavian CVC, arterial access includes a left radial arterial line. They also have 1 pleural and 2 mediastinal chest tubes with appropriate output. He also has v wires in place.    Intraoperatively, they received 3L of crystalloid, 300cc of cell saver, and 2u cryoprecipitate. The pre-operative echocardiogram was notable for normal biventricular function. Post-operative echo was normal biventricular function. Patient defibrillated x2 coming off pump but no pacing since.    Immediate post-operative plans include hemodynamic stabilization and weaning of cardiac and respiratory support. Plan to wean ventilator support with goal of early extubation, and closely monitor fluid status with strict Is/Os and continued fluid resuscitation as needed.

## 2025-02-21 NOTE — ASSESSMENT & PLAN NOTE
S/p CABG x2 w/ Dr. Leal on 2/21.    Neuro/Psych:   - Sedation: propofol/precedex  - Pain:     - Scheduled Tylenol 1g q8h    - Fentanyl PRN while intubated, Oxy PRN   - Home lamotrigine 200mg qd and ativan 0.5mg BID                Cardiac:   - BP Goal: MAP 65-85  - Pressors: none  - Rhythm: NSR  - Cleviprex/Cardene PRN  - Anti-HTNs: Resume as appropriate. No home meds  - Beta blocker: Resume as appropriate. No home meds  - Statin: Atorvastatin 40 mg QD    Pulmonary:   - Goal SpO2 >92%  - Will wean ventilator support as tolerated to extubate  - ABGs q1h x4. Will decrease if stable  - Chest Tubes x 3 (2 Meds & 1 Pleural)      Renal:      - Maintain Snell, record strict Is/Os      - Flomax qd  Recent Labs   Lab 02/19/25  1154 02/20/25  0541 02/21/25  0639   BUN 14 13 16   CREATININE 1.2 1.1 1.1        FEN / GI:     - Daily CMP, Mag/Phos    - Replace electrolytes as needed    - Nutrition: NPO pending extubation. Will advance as tolerated    - Bowel Regimen: Miralax, docusate    - Famotidine BID    - Q2h Lactate     ID:   - Afebrile  - Abx: Complete perioperative cefazolin 2g Q8H x 5 doses  Recent Labs   Lab 02/19/25  1154 02/20/25  0541 02/21/25  0640   WBC 5.57 6.62 7.34       Heme/Onc:   - Hgb 14.1 pre-operatively  - CBC, PTT/INR daily  - DVT ppx: SCDs, ASA  - ASA 81mg daily. First dose w/in 6h of surgery  - Q4h fibrinogen. Goal >300  - Will repeat fibrinogen after receiving 2u cryo  Recent Labs   Lab 02/19/25  1154 02/20/25  0541 02/20/25  1158 02/21/25  0640   HGB 14.4 13.4*  --  14.1    249  --  273   APTT  --  48.4* 40.3*  40.3*  --        Endocrine:   - CTS Goal -140  - HgbA1c: 5.2  - Endocrinology consulted for insulin management     PPx:   Feeding: NPO. Will advance diet after extubation  Analgesia/Sedation: MMPC  Thromboembolic Prevention: SCDs, ASA  HOB >30: Yes  Stress Ulcer: famotidine BID  Glucose Control: Yes, insulin management per Endocrinology     Lines/Drains/Airway:  ETT  Left radial  arterial line  Left subclavian CVC  Snell  Chest Tubes: 3 (2 Mediastinal, 1 Pleural)   Pacing Wires: Temporary ventricular pacing wires     Dispo/Code Status/Palliative:    - SICU   - Full Code

## 2025-02-21 NOTE — NURSING
Pt admitted to OR s/p CABG x2.  Pt connected to wall monitor upon arrival.  Charge nurse, RT, and CTS resident notified of arrival.  Assessment performed and labs drawn.  Awaiting new orders.

## 2025-02-21 NOTE — CONSULTS
"Information packet sent to patient, which includes "Your guide to living with heart disease". Letter was also sent to patient.     Gabino Varela RN  Cardiac Rehab Nurse  "

## 2025-02-21 NOTE — OP NOTE
DATE OF PROCEDURE:  2/21/2025     PREOPERATIVE DIAGNOSES:  1.  Coronary artery of the native vessels   2. Unstable angina   3. Generalized convulsive epilepsy with intractable epilepsy   4. Primary hypertension   5. Mixed hyperlipidemia  6. Vitamin B12 deficiency      POSTOPERATIVE DIAGNOSES: SAME       OPERATIONS:  1.  Coronary artery bypass grafting x 2 (LIMA-LAD and SVG- distal RCA).  2.  Takedown of the left internal mammary artery, skeletonized technique.  3. Endoscopic vein harvest from Left Lower Extremity       STAFF SURGEON:  lFavio Leal M.D.     FIRST ASSISTANT:  Melonie Almendarez     ANESTHESIA:  GETA.     ESTIMATED BLOOD LOSS:  200 mL.     KEY FINDINGS OF THE OPERATION:  1.  Preserved ejection fraction   2. Good quality of left internal mammary artery      INDICATION OF OPERATION:  This is a 69-year-old male who presented with positive stress test and was found to have coronary artery disease.  Patient has started to have unstable angina and was admitted from the cath lab.  Extensive discussions were carried out and after understanding risks and benefits patient wanted to proceed with surgical revascularization.  Patient was started on a heparin drip and consented for surgery.  .     PROCEDURE IN OPERATION:  The patient was brought to the Operating Room and placed in a supine position.  After induction of anesthesia, the area was prepped and draped in the usual sterile fashion.  An upper midline incision was made, which was carried all the way down to the sternum.  Median sternotomy was then performed.  Sternal edges were cauterized.  Chest retractor was placed.  Pericardium was then opened.  Once that was done, target vessel was identified.  Target vessel was found to be a decent vessel for bypass.  The left hemisternum was elevated using a Rultract retractor.  The left internal mammary artery was taken down in a skeletonized fashion.  Simultaneously, endoscopic vein harvest was started  from the left lower extremity.     Systemic heparinization was done where an ACT greater than 450 was obtained.  The distal portion of the left internal mammary artery was ligated and cut.  It was prepared with papaverine to remove any vasospasm.  Arterial canula was placed in the ascending aorta and venous cannula ws placed in the right atrium. Retrograde cardioplegia catheter was placed in the coronary sinus, followed by antegrade cardioplegia catheter in the proximal ascending aorta. Patient was placed on CPB. This was followed by application of cross clamp to ascending aorta. Using antegrade and retrograde cardioplegia a cardiac standstill was obtained. The target vessels were identified. The distal portion of the RCA was found to be an adequate target site and an arteriotomy was made. This was followed by spatulation of the SVG. Using a 7-0 Prolene a continuous running anastomosis was obtained. A dose of cardioplegia was administered and a flow of 80 cc/min was noted at 120 mmHg pressure. Then attention was directed to the mid portion of the LAD. An arteriotomy was made and then using a 7-0 Prolene stitch, a continuous running anastomosis of the left internal mammary artery to the LAD was carried out.  Good hemostasis was ensured.  Another dose of cardioplegia was administered followed by preparation of the proximal vein anastomotic site to the ascending aorta using a 4 mm aortic punch. Using a 5-0 prolene suture a continuous running hemostatic anastomosis was created. This was followed by a hot shot and aortic cross clamp was then removed. Instant cardiac activity was noted. All electrolytes were found to be WNL. Ventilation was resumed and gradually the patient was weaned from CPB after ensuring absence of any intracardiac air on BONG examination.      A test dose of protamine was given followed by full dose of protamine to reverse the effects of systemic heparin.  Temporary ventricular pacing wires were  placed on the surface and brought through separateskin incision.  Two mediastinal and one left pleural chest tube was placed and brought through separate skin incision and connected to Pleur-evac.  Sternum was then approximated by #6 stainless steel wire.   Terminal count of needles, sponges, and instrument was found to be correct.     MEDICARE ATTESTATION:  I was present for all parts of the operation and Dr. Terry acted as my 1st assistant.

## 2025-02-21 NOTE — ASSESSMENT & PLAN NOTE
Patient's blood pressure range in the last 24 hours was: BP  Min: 126/78  Max: 168/86.The patient's inpatient anti-hypertensive regimen is listed below:    Plan  - BP is controlled, no changes needed to their regimen

## 2025-02-21 NOTE — ASSESSMENT & PLAN NOTE
68 yo M CAD, HTN, seizure disorder, and peripheral neuropathy who presents to [facility]  after having chest pain for 3 days. Stress test done for a preop evaluation to be a kidney donor was positive. Stress echo revealed EF 50-55%, EKG revealed ST seg depression in inferolateral leads (II, III, aVF, V5 and V6), post stress EF reduced to 43%. Patient states that 5 days ago he began to notice sharp left sided pain that was intermittent and occurred in the morning, then the past 2 days including this morning, noticed anterior chest heaviness that lasted 45 minutes, which self resolved without nitroglycerin or pain medication. Pain was occurring at rest in the morning. Pt currently denies chest pain, sob, cough, sore throat, abdominal pain, n/v/d, constipation, fever, chills, headache, numbness or tingling or weakness of the extremities In the ED, patient AF, HR 75, RR 20, /92. Initial labs CBC, CMP unremarkable, BNP 78, and negative troponin. EKG showed NSR Qtc 385. CXR without acute cardiopulmonary process.     Interventional cardiology consulted, had LHC showing 3 vessel disease. Patient evaluated by CTS and is scheduled for CABG    - CABG today, plan per SICU  - continue telemetry  - maintain Mg >2, K>4, P >3

## 2025-02-21 NOTE — ANESTHESIA PROCEDURE NOTES
Central Line    Diagnosis: CAD  Patient location during procedure: done in OR  Procedure Urgency: Routine      Staffing  Authorizing Provider: Tulio Manuel MD  Performing Provider: Franchesca Akers MD    Staffing  Performed by: Franchesca Akers MD  Authorized by: Tulio Manuel MD    Anesthesiologist was present at the time of the procedure.  Preanesthetic Checklist  Completed: patient identified, IV checked, site marked, risks and benefits discussed, surgical consent, monitors and equipment checked, pre-op evaluation, timeout performed and anesthesia consent given  Indication   Indication: hemodynamic monitoring, vascular access, med administration     Anesthesia   general anesthesia    Central Line   Skin Prep: skin prepped with ChloraPrep, skin prep agent completely dried prior to procedure  Sterile Barriers Followed: Yes    All five maximal barriers used- gloves, gown, cap, mask, and large sterile sheet    hand hygiene performed prior to central venous catheter insertion  Location: left subclavian.   Catheter type: quad lumen  Catheter Size: 9 Fr  Ultrasound: none      Manometry: Venous cannualation confirmed by visual estimation of blood vessel pressure using manometry.  Insertion Attempts: 1   Securement:line sutured, chlorhexidine patch, sterile dressing applied and blood return through all ports    Post-Procedure    Adverse Events:none      Guidewire Guidewire removed intact.

## 2025-02-21 NOTE — PLAN OF CARE
Patient free of falls or injuries . CHG bath given, clipped from chin to ankles per order, clean sheets and clean gown provided, and two PIV present. NPO since  midnight 2/21/2025. Heparin gtt infusing at 12 units/kg/hr. Will hold heparin gtt when called to OR per order. NSR and sinus bradycardia on the cardiac monitor. Denied any pain or discomfort. Plan of care reviewed, all questions and concerns addressed.       Problem: Adult Inpatient Plan of Care  Goal: Plan of Care Review  Outcome: Progressing  Goal: Patient-Specific Goal (Individualized)  Outcome: Progressing  Goal: Absence of Hospital-Acquired Illness or Injury  Outcome: Progressing  Goal: Optimal Comfort and Wellbeing  Outcome: Progressing  Goal: Readiness for Transition of Care  Outcome: Progressing     Problem: Wound  Goal: Optimal Coping  Outcome: Progressing  Goal: Optimal Functional Ability  Outcome: Progressing  Goal: Absence of Infection Signs and Symptoms  Outcome: Progressing  Goal: Improved Oral Intake  Outcome: Progressing  Goal: Optimal Pain Control and Function  Outcome: Progressing  Goal: Skin Health and Integrity  Outcome: Progressing  Goal: Optimal Wound Healing  Outcome: Progressing     Problem: Fall Injury Risk  Goal: Absence of Fall and Fall-Related Injury  Outcome: Progressing

## 2025-02-21 NOTE — ANESTHESIA PROCEDURE NOTES
BL pectointercostal blocks    Patient location during procedure: pre-op   Block not for primary anesthetic.  Reason for block: at surgeon's request and post-op pain management   Post-op Pain Location: chest      Staffing  Authorizing Provider: Tulio Manuel MD  Performing Provider: Franchesca Akers MD    Staffing  Performed by: Franchesca Akers MD  Authorized by: Tulio Manuel MD    Preanesthetic Checklist  Completed: patient identified, IV checked, site marked, risks and benefits discussed, surgical consent, monitors and equipment checked, pre-op evaluation and timeout performed  Peripheral Block  Patient position: supine  Prep: ChloraPrep  Patient monitoring: heart rate, cardiac monitor, continuous pulse ox, continuous capnometry and frequent blood pressure checks  Block type: Other (pectointercostal)  Laterality: bilateral  Injection technique: single shot  Needle  Needle type: Stimuplex   Needle gauge: 21 G  Needle length: 4 in  Needle localization: anatomical landmarks and ultrasound guidance   -ultrasound image captured on disc.  Assessment  Injection assessment: negative aspiration, negative parasthesia and local visualized surrounding nerve  Paresthesia pain: none  Heart rate change: no  Slow fractionated injection: yes  Pain Tolerance: comfortable throughout block  Medications:    Medications: bupivacaine (pf) (MARCAINE) injection 0.5% - Perineural   30 mL - 2/21/2025 9:40:00 AM    Additional Notes    VSS.  [facility] RN monitoring vitals throughout procedure.  Patient tolerated procedure well.

## 2025-02-21 NOTE — ANESTHESIA PROCEDURE NOTES
BONG    Diagnosis: CAD  Patient location during procedure: OR  Exam type: Baseline    Staffing  Performed: anesthesiologist and fellow     Anesthesiologist: Tulio Manuel MD        Anesthesiologist Present  Yes      Setup & Induction  Patient preparation: bite block inserted  Probe Insertion: easy  Exam: completeDoppler Echo: 2D, 3D, color flow mapping, pulse wave Doppler and continuous wave Doppler.  Exam                                         Great Vessels  Ascending Aorta Diameter: 3.5 cm       Effusions    SummaryFindings discussed with surgeon.    Other Findings   Baseline  -Normal LV systolic function. EF 60-65%.   -Normal RV function. TAPSE > 2cm. Free mall motion and apical contractility normal.   -No TR  -Mild PI  -No ASD or PFO  -Normal pulmonary veinous waveform, with systolic predominance. HAMZAH velocity > 60cm/s, no thrombus seen.  -Mild central MR, vena contracta 0.2cm.  -No AI or AS  -Ascending aorta 3.5cm at the level of the RPA. No evidence of dissection  -No pericardial or pleural effusion.     Post CABGx2

## 2025-02-21 NOTE — CONSULTS
[facility]  Endocrinology  Diabetes Consult Note    Consult Requested by: Flavio Leal MD   Reason for admit: S/P CABG (coronary artery bypass graft)    HISTORY OF PRESENT ILLNESS:  Reason for Consult: Management of Hyperglycemia     Surgical Procedure and Date: s/p CABG x2 on 02/21/2025    Patient is not diabetic and does not currently take any oral/injectable antidiabetic/hypoglycemic medications.   Lab Results   Component Value Date    HGBA1C 5.2 01/29/2025         HPI: Mr. Ye Partida is a 69M with a PMHx of CAD, HTN, seizure disorder, and peripheral neuropathy who [place] [facility] [place] for 3 days. The patient presents to the SICU s/p CABG x2 with Dr. Leal on 02/21/2025. On admission, they are intubated, sedated with propofol and precedex, and in stable condition. Endocrine consulted to manage hyperglycemia in the context of CTS.           Interval HPI:   Overnight events: Patient in room 81185 TCVICU/68676 TCVIC*. Blood glucose stable. BG at goal on current insulin regimen (IIP). Steroid use- Dexamethasone  4 mg. * Day of Surgery *  Renal function- Normal   Vasopressors-  None       Endocrine will continue to follow and manage insulin orders inpatient.         Diet NPO Except for: Medication, Sips with Medication     Eating:   NPO  Nausea: No  Hypoglycemia and intervention: No  Fever: No  TPN and/or TF: No    PMH, PSH, FH, SH updated and reviewed     ROS:  Review of Systems  [place] due to intubation and sedation.     Current Medications and/or Treatments Impacting Glycemic Control  Immunotherapy:    Immunosuppressants       None          Steroids:   Hormones (From admission, onward)      Start     Stop Route Frequency Ordered    02/19/25 1530  melatonin tablet 6 mg         -- Oral Nightly PRN 02/19/25 1430          Pressors:    Autonomic Drugs (From admission, onward)      None          Hyperglycemia/Diabetes Medications:   Antihyperglycemics (From admission, onward)  "     Start     Stop Route Frequency Ordered    02/21/25 1615  insulin regular in 0.9 % NaCl 100 unit/100 mL (1 unit/mL) infusion        Question Answer Comment   Insulin rate changes (DO NOT MODIFY ANSWER) \\[email or web address] [email or web address]    Enter initial dose (Units/hr): 1        -- IV Continuous 02/21/25 1529             PHYSICAL EXAMINATION:  Vitals:    02/21/25 1638   BP:    Pulse: (!) 57   Resp: 17   Temp:      Body mass index is 20.65 kg/m².     Physical Exam   Constitutional: Well developed, obese, NAD.  ENT: External ears no masses with nose patent  Neck: Supple; trachea midline  Cardiovascular: Normal heart sounds, no LE edema. DP +2 bilaterally.  Lungs: Normal effort; lungs anterior bilaterally clear to auscultation.  Intubated on a ventilator.  Abdomen: Soft, no masses, no hernias.  Hypoactive BS noted.  MS: No clubbing or cyanosis of nails noted; unable to assess gait.  Skin: No rashes, lesions, or ulcers; no nodules.  Injection sites are ok. No lipo hypertropthy or atrophy.  Mid-sternal incision with telfa island dressing, CDI.  CT x 2.  LLE wrapped with ACE wrap.  Psychiatric: [place]  Neurological: [place]  Foot: Nails in good condition, no amputations noted      Labs Reviewed and Include   Recent Labs   Lab 02/21/25  1537   *   CALCIUM 8.1*   ALBUMIN 2.3*   PROT 3.8*      K 3.9   CO2 20*   *   BUN 11   CREATININE 0.9   ALKPHOS 46   ALT 12   AST 32   BILITOT 0.6     Lab Results   Component Value Date    WBC 8.31 02/21/2025    HGB 10.1 (L) 02/21/2025    HCT 28 (L) 02/21/2025    MCV 90 02/21/2025     02/21/2025     No results for input(s): "TSH", "FREET4" in the last 168 hours.  Lab Results   Component Value Date    HGBA1C 5.2 01/29/2025       Nutritional status:   Body mass index is 20.65 kg/m².  Lab Results   Component Value Date    ALBUMIN 2.3 (L) 02/21/2025    ALBUMIN 3.4 (L) 02/21/2025    ALBUMIN 3.4 (L) 02/20/2025     No results found " "for: "PREALBUMIN"    Estimated Creatinine Clearance: 67.5 mL/min (based on SCr of 0.9 mg/dL).    Accu-Checks  Recent Labs     02/20/25  0722 02/20/25  1240 02/20/25  1610 02/20/25  1942 02/21/25  0612 02/21/25  1532 02/21/25  1604 02/21/25  1636   POCTGLUCOSE 72 132* 89 129* 108 119* 120* 122*        ASSESSMENT and PLAN    Cardiac/Vascular  * S/P CABG (coronary artery bypass graft)  Optimize BG control to improve wound healing        Primary hypertension  Uncontrolled HTN can worsen insulin resistance.         Endocrine  Transient hyperglycemia post procedure  Endocrinology consulted for BG management.   BG goal 110-140 CTS      - IIP  - Requires intensive BG monitoring.   - BG checks q1hr  - Hypoglycemia protocol in place    - Notify endocrine if patient becomes hypokalemic (K < 3.3) and/or is not responding to replacement.   - Notify endocrine if patient requiring > 20 units/hr.      ** Please notify Endocrine for any change and/or advance in diet**  ** Please call Endocrine for any BG related issues **    Discharge Planning:   TBD. Please notify endocrinology prior to discharge.            Plan discussed with patient, family, and RN at bedside.        King Lundy, DNP, FNP  Endocrinology  [facility]  "

## 2025-02-21 NOTE — SUBJECTIVE & OBJECTIVE
Follow-up For: Procedure(s) (LRB):  CORONARY ARTERY BYPASS GRAFT (CABG) (N/A)  HARVEST-VEIN-ENDOVASCULAR (Left)    Post-Operative Day: * Day of Surgery *     Past Medical History:   Diagnosis Date    Hyperlipidemia     Pyloric stenosis, congenital     s/p repair    Seizures        Past Surgical History:   Procedure Laterality Date    CORONARY ANGIOGRAPHY Right 2025    Procedure: ANGIOGRAM, CORONARY ARTERY;  Surgeon: Ari Latham III, MD;  Location: [facility] CATH LAB;  Service: Cardiology;  Laterality: Right;    pyloric stenosis      TONSILLECTOMY         Review of patient's allergies indicates:   Allergen Reactions    Antihistamines - alkylamine Other (See Comments)     Heart race    Codeine Other (See Comments)     Heart race       Family History       Problem Relation (Age of Onset)    Alcohol abuse Sister, Brother    Arthritis Mother    Cancer Mother, Father    Crohn's disease Son    Diabetes Son    Heart disease Sister    Hypertension Mother, Sister, Brother    Kidney disease Son    Obesity Sister, Brother          Tobacco Use    Smoking status: Former     Current packs/day: 0.00     Types: Cigarettes     Quit date:      Years since quittin.    Smokeless tobacco: Never   Substance and Sexual Activity    Alcohol use: No    Drug use: No    Sexual activity: Yes     Partners: Female      Review of Systems   Unable to perform ROS: Intubated     Objective:     Vital Signs (Most Recent):  Temp: 97.5 °F (36.4 °C) (25 1528)  Pulse: (!) 58 (25 1528)  Resp: 20 (25 1528)  BP: (!) 168/86 (25 0802)  SpO2: 100 % (25 1528) Vital Signs (24h Range):  Temp:  [97.4 °F (36.3 °C)-98.2 °F (36.8 °C)] 97.5 °F (36.4 °C)  Pulse:  [55-74] 58  Resp:  [16-20] 20  SpO2:  [97 %-100 %] 100 %  BP: (126-168)/(75-86) 168/86  Arterial Line BP: (103)/(58) 103/58     Weight: 61.6 kg (135 lb 12.9 oz)  Body mass index is 20.65 kg/m².      Intake/Output Summary (Last 24 hours) at 2025 1533  Last data  filed at 2/21/2025 1518  Gross per 24 hour   Intake 4595 ml   Output 1600 ml   Net 2995 ml          Physical Exam  Constitutional:       Comments: sedated   Cardiovascular:      Rate and Rhythm: Normal rate.      Comments: Mediastinal incision with island dressing in place  V wires backup paced   Pulmonary:      Comments: Intubated  Chest tubes x3 (1 pleural 2 meds) with SS output  Abdominal:      General: Abdomen is flat.      Palpations: Abdomen is soft.   Genitourinary:     Comments: south  Skin:     General: Skin is warm and dry.   Neurological:      Comments: sedated            Vents:  Vent Mode: A/C (02/21/25 1524)  Ventilator Initiated: Yes (02/21/25 1524)  Set Rate: 20 BPM (02/21/25 1524)  Vt Set: 450 mL (02/21/25 1524)  PEEP/CPAP: 5 cmH20 (02/21/25 1524)  Oxygen Concentration (%): 100 (02/21/25 1528)  Peak Airway Pressure: 17 cmH20 (02/21/25 1524)  Plateau Pressure: 0 cmH20 (02/21/25 1524)  Total Ve: 8.3 L/m (02/21/25 1524)  Negative Inspiratory Force (cm H2O): 0 (02/21/25 1524)    Lines/Drains/Airways       Central Venous Catheter Line  Duration                  Percutaneous Central Line Insertion/Assessment - Quad lumen  02/21/25 1025 Subclavian Left <1 day    Percutaneous Central Line - Quad Lumen  02/21/25 1025 Subclavian Left <1 day              Drain  Duration                  Urethral Catheter 02/21/25 0949 Silicone;Temperature probe;Straight-tip;Non-latex 14 Fr. <1 day    Y Chest Tube 1 and 2 and 3 02/21/25 1401 Anterior Mediastinal 32 Fr. Anterior Mediastinal 32 Fr. Left Pleural 32 Fr. <1 day              Airway  Duration                  Airway - Non-Surgical 02/21/25 1527 <1 day              Arterial Line  Duration             Arterial Line 02/21/25 1024 Left Radial <1 day              Line  Duration                  Pacer Wires 02/21/25 1401 <1 day              Peripheral Intravenous Line  Duration                  Peripheral IV - Single Lumen 02/19/25 1542 20 G Left;Posterior Forearm 1 day          Peripheral IV - Single Lumen 02/21/25 0948 16 G Right Antecubital <1 day                    Significant Labs:    CBC/Anemia Profile:  Recent Labs   Lab 02/20/25  0541 02/21/25  0640 02/21/25  1258 02/21/25  1319 02/21/25  1346 02/21/25  1407   WBC 6.62 7.34  --   --   --  11.02   HGB 13.4* 14.1  --   --   --  8.4*   HCT 41.5 44.8   < > 26* 26* 25.2*    273  --   --   --  135*   MCV 92 97  --   --   --  91   RDW 15.3* 15.7*  --   --   --  15.2*    < > = values in this interval not displayed.        Chemistries:  Recent Labs   Lab 02/20/25  0541 02/21/25  0639    141   K 3.4* 4.4    106   CO2 28 28   BUN 13 16   CREATININE 1.1 1.1   CALCIUM 8.6* 8.7   ALBUMIN 3.4* 3.4*   PROT 5.6* 5.5*   BILITOT 0.6 0.4   ALKPHOS 66 67   ALT 14 15   AST 29 25   MG 1.9 2.2   PHOS 2.9 2.9       All pertinent labs within the past 24 hours have been reviewed.    Significant Imaging: I have reviewed all pertinent imaging results/findings within the past 24 hours.

## 2025-02-21 NOTE — H&P
[facility]  Critical Care - Surgery  History & Physical    Patient Name: Ye Partida  MRN: [id number]  Admission Date: 2/19/2025  Code Status: Full Code  Attending Physician: King Spencer, *   Primary Care Provider: Cameron Tran MD   Principal Problem: Chest pain    Subjective:     HPI:  Mr. Ye Partida is a 69M with a PMHx of CAD, HTN, seizure disorder, and peripheral neuropathy who presents to [facility] [place] after having chest pain for 3 days. Stress test done for a preop evaluation to be a kidney donor was positive. Stress echo revealed EF 50-55%, EKG revealed ST seg depression in inferolateral leads (II, III, aVF, V5 and V6), post stress EF reduced to 43%. Interventional cardiology consulted and patient underwent cardiac cath 2/19.     The patient presents to the SICU s/p CABG x2 with Dr. Leal on 02/21/2025. On admission, they are intubated, sedated with propofol and precedex, and in stable condition. No inotropic and vasopressor requirements upon admission to maintain MAP 65-85. Central access includes a L subclavian CVC, arterial access includes a left radial arterial line. They also have 1 pleural and 2 mediastinal chest tubes with appropriate output. He also has v wires in place.    Intraoperatively, they received 3L of crystalloid, 300cc of cell saver, and 2u cryoprecipitate. The pre-operative echocardiogram was notable for normal biventricular function. Post-operative echo was normal biventricular function. Patient defibrillated x2 coming off pump but no pacing since.    Immediate post-operative plans include hemodynamic stabilization and weaning of cardiac and respiratory support. Plan to wean ventilator support with goal of early extubation, and closely monitor fluid status with strict Is/Os and continued fluid resuscitation as needed.     Hospital/ICU Course:  No notes on file    Follow-up For: Procedure(s) (LRB):  CORONARY ARTERY BYPASS GRAFT (CABG)  (N/A)  HARVEST-VEIN-ENDOVASCULAR (Left)    Post-Operative Day: * Day of Surgery *     Past Medical History:   Diagnosis Date    Hyperlipidemia     Pyloric stenosis, congenital     s/p repair    Seizures        Past Surgical History:   Procedure Laterality Date    CORONARY ANGIOGRAPHY Right 2025    Procedure: ANGIOGRAM, CORONARY ARTERY;  Surgeon: Ari Latham III, MD;  Location: [facility] CATH LAB;  Service: Cardiology;  Laterality: Right;    pyloric stenosis      TONSILLECTOMY         Review of patient's allergies indicates:   Allergen Reactions    Antihistamines - alkylamine Other (See Comments)     Heart race    Codeine Other (See Comments)     Heart race       Family History       Problem Relation (Age of Onset)    Alcohol abuse Sister, Brother    Arthritis Mother    Cancer Mother, Father    Crohn's disease Son    Diabetes Son    Heart disease Sister    Hypertension Mother, Sister, Brother    Kidney disease Son    Obesity Sister, Brother          Tobacco Use    Smoking status: Former     Current packs/day: 0.00     Types: Cigarettes     Quit date:      Years since quittin.1    Smokeless tobacco: Never   Substance and Sexual Activity    Alcohol use: No    Drug use: No    Sexual activity: Yes     Partners: Female      Review of Systems   Unable to perform ROS: Intubated     Objective:     Vital Signs (Most Recent):  Temp: 97.5 °F (36.4 °C) (25 1528)  Pulse: (!) 58 (25 1528)  Resp: 20 (25 1528)  BP: (!) 168/86 (25 0802)  SpO2: 100 % (25 1528) Vital Signs (24h Range):  Temp:  [97.4 °F (36.3 °C)-98.2 °F (36.8 °C)] 97.5 °F (36.4 °C)  Pulse:  [55-74] 58  Resp:  [16-20] 20  SpO2:  [97 %-100 %] 100 %  BP: (126-168)/(75-86) 168/86  Arterial Line BP: (103)/(58) 103/58     Weight: 61.6 kg (135 lb 12.9 oz)  Body mass index is 20.65 kg/m².      Intake/Output Summary (Last 24 hours) at 2025 1533  Last data filed at 2025 1518  Gross per 24 hour   Intake 4595 ml   Output 1600  ml   Net 2995 ml          Physical Exam  Constitutional:       Comments: sedated   Cardiovascular:      Rate and Rhythm: Normal rate.      Comments: Mediastinal incision with island dressing in place  V wires backup paced   Pulmonary:      Comments: Intubated  Chest tubes x3 (1 pleural 2 meds) with SS output  Abdominal:      General: Abdomen is flat.      Palpations: Abdomen is soft.   Genitourinary:     Comments: south  Skin:     General: Skin is warm and dry.   Neurological:      Comments: sedated            Vents:  Vent Mode: A/C (02/21/25 1524)  Ventilator Initiated: Yes (02/21/25 1524)  Set Rate: 20 BPM (02/21/25 1524)  Vt Set: 450 mL (02/21/25 1524)  PEEP/CPAP: 5 cmH20 (02/21/25 1524)  Oxygen Concentration (%): 100 (02/21/25 1528)  Peak Airway Pressure: 17 cmH20 (02/21/25 1524)  Plateau Pressure: 0 cmH20 (02/21/25 1524)  Total Ve: 8.3 L/m (02/21/25 1524)  Negative Inspiratory Force (cm H2O): 0 (02/21/25 1524)    Lines/Drains/Airways       Central Venous Catheter Line  Duration                  Percutaneous Central Line Insertion/Assessment - Quad lumen  02/21/25 1025 Subclavian Left <1 day    Percutaneous Central Line - Quad Lumen  02/21/25 1025 Subclavian Left <1 day              Drain  Duration                  Urethral Catheter 02/21/25 0949 Silicone;Temperature probe;Straight-tip;Non-latex 14 Fr. <1 day    Y Chest Tube 1 and 2 and 3 02/21/25 1401 Anterior Mediastinal 32 Fr. Anterior Mediastinal 32 Fr. Left Pleural 32 Fr. <1 day              Airway  Duration                  Airway - Non-Surgical 02/21/25 1527 <1 day              Arterial Line  Duration             Arterial Line 02/21/25 1024 Left Radial <1 day              Line  Duration                  Pacer Wires 02/21/25 1401 <1 day              Peripheral Intravenous Line  Duration                  Peripheral IV - Single Lumen 02/19/25 1542 20 G Left;Posterior Forearm 1 day         Peripheral IV - Single Lumen 02/21/25 0948 16 G Right Antecubital <1  day                    Significant Labs:    CBC/Anemia Profile:  Recent Labs   Lab 02/20/25  0541 02/21/25  0640 02/21/25  1258 02/21/25  1319 02/21/25  1346 02/21/25  1407   WBC 6.62 7.34  --   --   --  11.02   HGB 13.4* 14.1  --   --   --  8.4*   HCT 41.5 44.8   < > 26* 26* 25.2*    273  --   --   --  135*   MCV 92 97  --   --   --  91   RDW 15.3* 15.7*  --   --   --  15.2*    < > = values in this interval not displayed.        Chemistries:  Recent Labs   Lab 02/20/25  0541 02/21/25  0639    141   K 3.4* 4.4    106   CO2 28 28   BUN 13 16   CREATININE 1.1 1.1   CALCIUM 8.6* 8.7   ALBUMIN 3.4* 3.4*   PROT 5.6* 5.5*   BILITOT 0.6 0.4   ALKPHOS 66 67   ALT 14 15   AST 29 25   MG 1.9 2.2   PHOS 2.9 2.9       All pertinent labs within the past 24 hours have been reviewed.    Significant Imaging: I have reviewed all pertinent imaging results/findings within the past 24 hours.  Assessment/Plan:     Cardiac/Vascular  CAD (coronary artery disease)  S/p CABG x2 w/ Dr. Leal on 2/21.    Neuro/Psych:   - Sedation: propofol/precedex  - Pain:     - Scheduled Tylenol 1g q8h    - Fentanyl PRN while intubated, Oxy PRN   - Home lamotrigine 200mg qd and ativan 0.5mg BID                Cardiac:   - BP Goal: MAP 65-85  - Pressors: none  - Rhythm: NSR  - Cleviprex/Cardene PRN  - Anti-HTNs: Resume as appropriate. No home meds  - Beta blocker: Resume as appropriate. No home meds  - Statin: Atorvastatin 40 mg QD    Pulmonary:   - Goal SpO2 >92%  - Will wean ventilator support as tolerated to extubate  - ABGs q1h x4. Will decrease if stable  - Chest Tubes x 3 (2 Meds & 1 Pleural)      Renal:      - Maintain Snell, record strict Is/Os      - Flomax qd  Recent Labs   Lab 02/19/25  1154 02/20/25  0541 02/21/25  0639   BUN 14 13 16   CREATININE 1.2 1.1 1.1        FEN / GI:     - Daily CMP, Mag/Phos    - Replace electrolytes as needed    - Nutrition: NPO pending extubation. Will advance as tolerated    - Bowel Regimen:  Miralax, docusate    - Famotidine BID    - Q2h Lactate     ID:   - Afebrile  - Abx: Complete perioperative cefazolin 2g Q8H x 5 doses  Recent Labs   Lab 02/19/25  1154 02/20/25  0541 02/21/25  0640   WBC 5.57 6.62 7.34       Heme/Onc:   - Hgb 14.1 pre-operatively  - CBC, PTT/INR daily  - DVT ppx: SCDs, ASA  - ASA 81mg daily. First dose w/in 6h of surgery  - Q4h fibrinogen. Goal >300  - Will repeat fibrinogen after receiving 2u cryo  Recent Labs   Lab 02/19/25  1154 02/20/25  0541 02/20/25  1158 02/21/25  0640   HGB 14.4 13.4*  --  14.1    249  --  273   APTT  --  48.4* 40.3*  40.3*  --        Endocrine:   - CTS Goal -140  - HgbA1c: 5.2  - Endocrinology consulted for insulin management     PPx:   Feeding: NPO. Will advance diet after extubation  Analgesia/Sedation: MMPC  Thromboembolic Prevention: SCDs, ASA  HOB >30: Yes  Stress Ulcer: famotidine BID  Glucose Control: Yes, insulin management per Endocrinology     Lines/Drains/Airway:  ETT  Left radial arterial line  Left subclavian CVC  Snell  Chest Tubes: 3 (2 Mediastinal, 1 Pleural)   Pacing Wires: Temporary ventricular pacing wires     Dispo/Code Status/Palliative:    - SICU   - Full Code       Marialuisa Lee MD  Critical Care - Surgery  [facility] - Surgical Intensive Care

## 2025-02-21 NOTE — ASSESSMENT & PLAN NOTE
68 yo M CAD, HTN, seizure disorder, and peripheral neuropathy who presents to [facility] [place]  after having chest pain for 3 days. Stress test done for a preop evaluation to be a kidney donor was positive. Stress echo revealed EF 50-55%, EKG revealed ST seg depression in inferolateral leads (II, III, aVF, V5 and V6), post stress EF reduced to 43%. Patient states that 5 days ago he began to notice sharp left sided pain that was intermittent and occurred in the morning, then the past 2 days including this morning, noticed anterior chest heaviness that lasted 45 minutes, which self resolved without nitroglycerin or pain medication. Pain was occurring at rest in the morning. Pt currently denies chest pain, sob, cough, sore throat, abdominal pain, n/v/d, constipation, fever, chills, headache, numbness or tingling or weakness of the extremities In the ED, patient AF, HR 75, RR 20, /92. Initial labs CBC, CMP unremarkable, BNP 78, and negative troponin. EKG showed NSR Qtc 385. CXR without acute cardiopulmonary process.     Interventional cardiology consulted, had LHC showing 3 vessel disease. Patient evaluated by CTS and is scheduled for CABG    - CABG today  - NPO at midnight  - Continue asa 81 and statin  - Continued on heparin gtt  - continue telemetry  - maintain Mg >2, K>4, P >3

## 2025-02-21 NOTE — PLAN OF CARE
SW attempted to meet pt at bedside to complete discharge assessment. Pt was not in the room and per bedside nurse has gone for a procedure.       Ivory Perez, MSW  [id number]

## 2025-02-21 NOTE — SUBJECTIVE & OBJECTIVE
Interval History: Patient unable to be seen today after being taken for surgery.    Objective:     Vital Signs (Most Recent):  Temp: 97.4 °F (36.3 °C) (02/21/25 0406)  Pulse: (!) 56 (02/21/25 0819)  Resp: 18 (02/21/25 0800)  BP: (!) 168/86 (02/21/25 0802)  SpO2: 100 % (02/21/25 0800) Vital Signs (24h Range):  Temp:  [97.4 °F (36.3 °C)-98.2 °F (36.8 °C)] 97.4 °F (36.3 °C)  Pulse:  [55-74] 56  Resp:  [16-18] 18  SpO2:  [97 %-100 %] 100 %  BP: (126-168)/(75-86) 168/86     Weight: 61.6 kg (135 lb 12.9 oz)  Body mass index is 20.65 kg/m².    Intake/Output Summary (Last 24 hours) at 2/21/2025 1141  Last data filed at 2/21/2025 0538  Gross per 24 hour   Intake 240 ml   Output 800 ml   Net -560 ml               Significant Labs: All pertinent labs within the past 24 hours have been reviewed.    Significant Imaging: I have reviewed all pertinent imaging results/findings within the past 24 hours.

## 2025-02-21 NOTE — NURSING
Two working 20g PIV  present. Patient clipped hair per pre operative order. CHG wipes given to patient and instructions given. Clean gown provided and linen changed. Patient verbalized understanding of CHG bath and upcoming procedure. Care on going.

## 2025-02-21 NOTE — H&P
CTS Interval H&P  Ye Partida  02/21/2025    No changes since H&P last performed yesterday (see note below). Risks and benefits of surgery d/w patient in detail and informed consent obtained.       Melonie Terry MD  PGY-6 CTS Fellow      [facility] - Cardiology Stepdown  Cardiothoracic Surgery  Consult Note     Patient Name: Ye Partida  MRN: [id number]  Admission Date: 2/19/2025  Attending Physician: King Spencer, *  Referring Provider: Self, Aaareferral     Patient information was obtained from patient, past medical records, and ER records.      Inpatient consult to Cardiothoracic Surgery  Consult performed by: Adamaris Arnold NP  Consult ordered by: Edwardo De Jesus MD  Reason for consult: cabg eval           Subjective:      Principal Problem: Chest pain     History of Present Illness: Mr. Ye Partida is a 69 year old male with a PMHx of CAD, HTN, seizure disorder, and peripheral neuropathy who [place] to [facility] [place] after having chest pain for 3 days. Stress test done for a preop evaluation to be a kidney donor was positive. Stress echo revealed EF 50-55%, EKG revealed ST seg depression in inferolateral leads (II, III, aVF, V5 and V6), post stress EF reduced to 43%. Patient states that 5 days ago he began to notice sharp left sided pain that was intermittent and occurred in the morning, then the past 2 days including this morning, noticed anterior chest heaviness that lasted 45 minutes, which self resolved without nitroglycerin or pain medication. Pain was occurring at rest in the morning.      No current facility-administered medications on file prior to encounter.           Current Outpatient Medications on File Prior to Encounter   Medication Sig    ascorbic acid (VITAMIN C) 1000 MG tablet Take 1,000 mg by mouth once daily.    cyanocobalamin (VITAMIN B-12) 100 MCG tablet Take 100 mcg by mouth once daily.    lamotrigine (LAMICTAL) 200 MG tablet Take 1 tablet by mouth  daily    lorazepam  (ATIVAN) 0.5 MG tablet Take 1 tablet (0.5 mg total) by mouth 2 (two) times daily. Take 1/2 tablet (0.25mg) in am, take 1 tablet (0.5mg) in pm (Patient taking differently: Take 0.5 mg by mouth 2 (two) times daily.)               Review of patient's allergies indicates:   Allergen Reactions    Antihistamines - alkylamine Other (See Comments)       Heart race    Codeine Other (See Comments)       Heart race              Past Medical History:   Diagnosis Date    Hyperlipidemia      Pyloric stenosis, congenital       s/p repair    Seizures              Past Surgical History:   Procedure Laterality Date    CORONARY ANGIOGRAPHY Right 2025     Procedure: ANGIOGRAM, CORONARY ARTERY;  Surgeon: Ari Latham III, MD;  Location: [facility] CATH LAB;  Service: Cardiology;  Laterality: Right;    pyloric stenosis        TONSILLECTOMY          Family History         Problem Relation (Age of Onset)     Alcohol abuse Sister, Brother     Arthritis Mother     Cancer Mother, Father     Crohn's disease Son     Diabetes Son     Heart disease Sister     Hypertension Mother, Sister, Brother     Kidney disease Son     Obesity Sister, Brother                   Tobacco Use    Smoking status: Former       Current packs/day: 0.00       Types: Cigarettes       Quit date:        Years since quittin.    Smokeless tobacco: Never   Substance and Sexual Activity    Alcohol use: No    Drug use: No    Sexual activity: Yes       Partners: Female      Review of Systems   Constitutional:  Negative for activity change, appetite change, fatigue and fever.   HENT:  Negative for nosebleeds.    Respiratory:  Negative for cough and shortness of breath.    Cardiovascular:  Negative for chest pain, palpitations and leg swelling.   Gastrointestinal:  Negative for abdominal distention, abdominal pain and nausea.   Genitourinary:  Negative for frequency.   Musculoskeletal:  Negative for arthralgias and myalgias.   Skin:  Negative for rash.   Neurological:   Negative for dizziness and numbness.   Hematological:  Does not bruise/bleed easily.      Objective:      Vital Signs (Most Recent):  Temp: 98.3 °F (36.8 °C) (02/20/25 0715)  Pulse: 61 (02/20/25 1017)  Resp: 18 (02/20/25 0715)  BP: (!) 141/76 (02/20/25 0715)  SpO2: 98 % (02/20/25 0715) Vital Signs (24h Range):  Temp:  [97.3 °F (36.3 °C)-99.1 °F (37.3 °C)] 98.3 °F (36.8 °C)  Pulse:  [53-85] 61  Resp:  [17-18] 18  SpO2:  [93 %-100 %] 98 %  BP: (133-179)/(74-91) 141/76      Weight: 66.7 kg (147 lb)  Body mass index is 22.35 kg/m².     SpO2: 98 %        Intake/Output - Last 3 Shifts           02/18 0700  02/19 0659 02/19 0700 02/20 0659 02/20 0700 02/21 0659     P.O.   360       Total Intake(mL/kg)   360 (5.4)       Urine (mL/kg/hr)   700       Total Output   700       Net   -340                              Lines/Drains/Airways         Peripheral Intravenous Line  Duration                     Peripheral IV - Single Lumen 02/19/25 1153 20 G Left Antecubital 1 day          Peripheral IV - Single Lumen 02/19/25 1542 20 G Left;Posterior Forearm <1 day                                Physical Exam  HENT:      Head: Normocephalic and atraumatic.   Eyes:      Extraocular Movements: Extraocular movements intact.   Cardiovascular:      Rate and Rhythm: Normal rate and regular rhythm.   Pulmonary:      Effort: Pulmonary effort is normal.   Abdominal:      General: Abdomen is flat.      Palpations: Abdomen is soft.   Musculoskeletal:         General: Normal range of motion.      Cervical back: Normal range of motion.   Skin:     General: Skin is warm and dry.      Capillary Refill: Capillary refill takes less than 2 seconds.   Neurological:      General: No focal deficit present.               Significant Labs:  BMP:       Recent Labs   Lab 02/20/25  0541   GLU 83      K 3.4*      CO2 28   BUN 13   CREATININE 1.1   CALCIUM 8.6*   MG 1.9      CBC:       Recent Labs   Lab 02/20/25  0541   WBC 6.62   RBC 4.50*   HGB  13.4*   HCT 41.5      MCV 92   MCH 29.8   MCHC 32.3      CMP:       Recent Labs   Lab 02/20/25  0541   GLU 83   CALCIUM 8.6*   ALBUMIN 3.4*   PROT 5.6*      K 3.4*   CO2 28      BUN 13   CREATININE 1.1   ALKPHOS 66   ALT 14   AST 29   BILITOT 0.6      Coagulation:       Recent Labs   Lab 02/20/25  0541   APTT 48.4*         Significant Diagnostics:  ECHO:    Left Ventricle: The left ventricle is normal in size. Ventricular mass is normal. Normal wall thickness. Regional wall motion abnormalities present. See diagram for wall motion findings. There is low normal to slightly reduced  systolic function with a visually estimated ejection fraction of 50 - 55%. Ejection fraction is approximately 50%. There is normal diastolic function.    Right Ventricle: Normal right ventricular cavity size. Wall thickness is normal. Systolic function is normal.    Mitral Valve: There is mild bileaflet sclerosis. There is mild regurgitation.    Pulmonic Valve: There is mild regurgitation.    IVC/SVC: Normal venous pressure at 3 mmHg.    Baseline ECG: The Baseline ECG reveals sinus rhythm. The axis is normal. The ST segments are normal.    Stress ECG: There is 1.5 mm of downward-sloping ST segment depression in the inferolateral leads (II, III, aVF, V5 and V6) noted during stress. There are no arrhythmias during stress. There is normal blood pressure response with stress.    ECG Conclusion: The ECG portion of the study is positive for ischemia.    Post-stress Echo: The left ventricle systolic function is mildly decreased with an EF of 43%. The post-stress echo showed wall motion abnormalities compared to baseline. Right ventricle systolic function is normal.    Post-stress Conclusion: The study is consistent with ischemia and consistent with scar and probably 3 vessel CAD.     Carotids:  Impression:  1-39% calculated carotid bifurcation stenosis bilaterally.  By grayscale appearance, stenosis at the right carotid  bifurcation is likely at the upper end of this range.     Ct Chest:  1. No acute intrathoracic abnormality.  2. Coronary artery calcific atherosclerosis.  3. Few bilateral pulmonary micro nodules.  For multiple solid nodules all <6 mm, Fleischner Society 2017 guidelines recommend no routine follow up for a low risk patient, or optional follow up with non-contrast chest CT at 12 months after discovery in a high risk patient.  4. Additional findings as above.     LHC:      The Prox RCA lesion was 99% stenosed.    The Mid Cx lesion was 90% stenosed.    The Prox LAD to Mid LAD lesion was 80% stenosed.    There was three vessel coronary artery disease.  I have reviewed and interpreted all pertinent imaging results/findings within the past 24 hours.     Assessment/Plan:      NYHA Score: NYHA I: cardiac disease, but without resulting limitations of physical activity     CAD (coronary artery disease)  Mr. Ye Partida is a 69 year old male with a PMHx of CAD, HTN, seizure disorder, and peripheral neuropathy who presents to [facility] [place] after having chest pain for 3 days. Stress test done for a preop evaluation to be a kidney donor was positive. Stress echo revealed EF 50-55%, EKG revealed ST seg depression in inferolateral leads (II, III, aVF, V5 and V6), post stress EF reduced to 43%.    During his admission, he underwent a cardiac evaluation which demonstrated multivessel coronary artery disease with Prox RCA lesion was 99% stenosed, Mid Cx lesion was 90% stenosed and Prox LAD to Mid LAD lesion was 80% stenosed.        Cath films reviewed and target present.  Plans for OR in th AM, patient and family aware and agree with plan.  Please contact CTS prior to adding on cardiac medications.   Will place pre-op orders today.  Patient will be NPO at midnight in anticipation of surgical revascularization in the morning.            Thank you for your consult. I will follow-up with patient. Please contact us if you have any additional  questions.     Adamaris Arnold NP  Cardiothoracic Surgery

## 2025-02-21 NOTE — TRANSFER OF CARE
"Anesthesia Transfer of Care Note    Patient: Ye Partida    Procedure(s) Performed: Procedure(s) (LRB):  CORONARY ARTERY BYPASS GRAFT (CABG) (N/A)  HARVEST-VEIN-ENDOVASCULAR (Left)    Patient location: ICU    Anesthesia Type: general    Transport from OR: Transported from OR intubated on 100% O2  with adequate ventilation controlled by transport ventilator    Post pain: adequate analgesia    Post assessment: no apparent anesthetic complications    Post vital signs: stable    Level of consciousness: sedated    Nausea/Vomiting: no nausea/vomiting    Complications: none    Transfer of care protocol was followed      Last vitals: Visit Vitals  /64   Pulse (!) 55   Temp 36.4 °C (97.5 °F) (Oral)   Resp 20   Ht 5' 8" (1.727 m)   Wt 61.6 kg (135 lb 12.9 oz)   SpO2 100%   BMI 20.65 kg/m²     "

## 2025-02-21 NOTE — ANESTHESIA PROCEDURE NOTES
Arterial    Diagnosis: CAD    Patient location during procedure: done in OR    Staffing  Authorizing Provider: Tulio Manuel MD  Performing Provider: Franchesca Akers MD    Staffing  Performed by: Franchesca Akers MD  Authorized by: Tulio Manuel MD    Anesthesiologist was present at the time of the procedure.    Preanesthetic Checklist  Completed: patient identified, IV checked, site marked, risks and benefits discussed, surgical consent, monitors and equipment checked, pre-op evaluation, timeout performed and anesthesia consent givenArterial  Skin Prep: chlorhexidine gluconate and isopropyl alcohol  Local Infiltration: none  Orientation: left  Location: radial    Catheter Size: 20 G  Catheter placement by Ultrasound guidance. Heme positive aspiration all ports.   Vessel Caliber: large, patent, compressibility normal  Needle advanced into vessel with real time Ultrasound guidance.Insertion Attempts: 1  Assessment  Dressing: secured with tape and tegaderm  Patient: Tolerated well

## 2025-02-21 NOTE — PROGRESS NOTES
Family Medicine Progress Note    Patient:  Jennifer Pierson  YOB: 1990    MRN: [id number]     Acct: [id number]     Admit date: 11/12/2023    Patient Seen, Chart, Consults notes, Labs, Radiology studies reviewed.    Subjective: Day 4 of stay with nontraumatic rhabdomyolysis secondary to underlying glycogen-storage disease type V and most recent (in last 24 hours) has had some lessening of pain in terms of area.  Intensity is marginally better.  No new complaints.    Past, Family, Social History unchanged from admission.    Diet: Diet: Regular/House Diet; Texture: Regular Texture (IDDSI 7); Fluid Consistency: Thin (IDDSI 0)    Medications:  Scheduled Meds:amLODIPine, 5 mg, Oral, Daily  cholecalciferol, 5,000 Units, Oral, Daily  escitalopram, 20 mg, Oral, Daily  famotidine, 20 mg, Oral, BID  melatonin, 10 mg, Oral, Nightly  methylPREDNISolone sodium succinate, 40 mg, Intravenous, Q12H  nebivolol, 10 mg, Oral, Daily  pregabalin, 100 mg, Oral, TID  QUEtiapine, 50 mg, Oral, Nightly  sodium bicarbonate, 650 mg, Oral, Daily  traZODone, 50 mg, Oral, Nightly      Continuous Infusions:sodium bicarbonate 8.4 % 75 mEq in dextrose (D5W) 5 % 1,000 mL infusion (greater than 75 mEq), 75 mEq, Last Rate: 75 mEq (11/16/23 0404)  sodium chloride, 100 mL/hr, Last Rate: 100 mL/hr (11/16/23 0637)      PRN Meds:  clonazePAM    cyclobenzaprine    HYDROmorphone    ondansetron ODT    oxyCODONE-acetaminophen    promethazine    [COMPLETED] Insert Peripheral IV **AND** sodium chloride    Objective:    Lab Results (last 24 hours)       Procedure Component Value Units Date/Time    CK [[id number]]  (Abnormal) Collected: 11/16/23 0541    Specimen: Blood Updated: 11/16/23 0627     Creatine Kinase 3,484 U/L     Basic Metabolic Panel [[id number]]  (Abnormal) Collected: 11/16/23 0541    Specimen: Blood Updated: 11/16/23 0615     Glucose 149 mg/dL      BUN 18 mg/dL      Creatinine 0.65 mg/dL      Sodium 140 mmol/L      Potassium 4.0 mmol/L   Autotransfusion/Rapid Infusion Record:      02/21/2025  Autotransfusionist:  Avery Xiong    Surgeons and Role:     * Flavio Leal MD - Primary     * Melonie Terry MD - Fellow  Anesthesiologist:  Tulio Manuel MD    Past Medical History:   Diagnosis Date    Hyperlipidemia     Pyloric stenosis, congenital     s/p repair    Seizures        Procedure(s) (LRB):  CORONARY ARTERY BYPASS GRAFT (CABG) (N/A)  HARVEST-VEIN-ENDOVASCULAR (Left)     2:53 PM    Equipment:    Cell Saver     R.I.S.  : [employer] Model: CATSmart or CATSplus : Pita   Model: [id number]     Serial number: [id number]   Serial number:    Disposable lot #: [id number]   Disposable lot #:      Were extra cardiotomies used for cell saver?  no   if yes, #:      Solutions:  Anticoagulant: ACD-A   Expiration date: 11/1/26 Volume used: 800   Wash solution: 0.9% NaCl   Expiration date: 10/26 Volume used: 2660     Cell saver checklist  Time completed:           [x]   Circuit assembled correctly     [x]   Cell saver powered and operational     [x]   Vacuum connected, functional, adjust to max -150mmHg     [x]   Anticoagulant drip rate adjusted     [x]   Transfer bag properly labeled with patient name, expiration time, volume,       anticoagulant, OR number, and initials     [x]   Cell saver disinfected after use (completed at end of case)       Cell Saver volumes:    Total volume processed:     2301 mL     Total volume pRBCs recovered     305 mL     Volume pRBCs infused     305   mL         RIS checklist   Time completed:  []   RIS circuit assembled correctly     []   RIS power and operational     []   RIS disinfected after use (completed at end of case)       RIS volumes:    Total volume infused:    (see anesthesia record for blood       product information)    mL       Additional comments:                  "    Comment: Slight hemolysis detected by analyzer. Result may be falsely elevated.        Chloride 103 mmol/L      CO2 28.0 mmol/L      Calcium 8.6 mg/dL      BUN/Creatinine Ratio 27.7     Anion Gap 9.0 mmol/L      eGFR 119.4 mL/min/1.73     Narrative:      GFR Normal >60  Chronic Kidney Disease <60  Kidney Failure <15      CK [[id number]]  (Abnormal) Collected: 11/15/23 0641    Specimen: Blood Updated: 11/15/23 0729     Creatine Kinase 5,604 U/L     Basic Metabolic Panel [[id number]]  (Abnormal) Collected: 11/15/23 0641    Specimen: Blood Updated: 11/15/23 0715     Glucose 165 mg/dL      BUN 16 mg/dL      Creatinine 0.67 mg/dL      Sodium 141 mmol/L      Potassium 4.0 mmol/L      Chloride 103 mmol/L      CO2 32.0 mmol/L      Calcium 8.7 mg/dL      BUN/Creatinine Ratio 23.9     Anion Gap 6.0 mmol/L      eGFR 118.5 mL/min/1.73     Narrative:      GFR Normal >60  Chronic Kidney Disease <60  Kidney Failure <15               Imaging Results (Last 72 Hours)       ** No results found for the last 72 hours. **             Physical Exam:    Vitals: /70 (BP Location: Right arm, Patient Position: Lying)   Pulse 80   Temp 97.5 °F (36.4 °C) (Oral)   Resp 18   Ht 162.6 cm (64\")   Wt 136 kg (299 lb)   LMP 09/20/2023 (Approximate)   SpO2 99%   BMI 51.32 kg/m²   24 hour intake/output:  Intake/Output Summary (Last 24 hours) at 11/16/2023 0705  Last data filed at 11/16/2023 0637  Gross per 24 hour   Intake 7518.47 ml   Output --   Net 7518.47 ml     Last 3 weights:  Wt Readings from Last 3 Encounters:   11/12/23 136 kg (299 lb)   10/30/23 122 kg (269 lb)   09/25/23 129 kg (284 lb)       General Appearance alert, appears stated age, cooperative, and morbidly obese  Head normocephalic, without obvious abnormality and atraumatic  Eyes lids and lashes normal, conjunctivae and sclerae normal, and no icterus  Neck supple and trachea midline  Lungs clear to auscultation, respirations regular, and respirations even  Heart " regular rhythm & normal rate, normal S1, S2, and no murmur, no gallop, no rub  Abdomen no hepatomegaly, no splenomegaly, and soft non-tender  Extremities no edema, no cyanosis, and no redness, tenderness generalized muscle tenderness  Skin turgor normal and color normal  Neurologic Mental Status orientated to person, place, time and situation         Assessment:      Non-traumatic rhabdomyolysis    Depression    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  Did discuss with slowly tapering down on her opioid analgesics as tolerated.  Increase activity as she tolerates.  Has had a significant drop in CK level yesterday.  Hopefully continues and can discharge in the next 24 to 48 hours depending on symptoms and labs.      Electronically signed by Richy Espinoza MD on 11/16/2023 at 07:05 CST

## 2025-02-22 LAB
ALBUMIN SERPL BCP-MCNC: 3.3 G/DL (ref 3.5–5.2)
ALLENS TEST: ABNORMAL
ALP SERPL-CCNC: 42 U/L (ref 40–150)
ALT SERPL W/O P-5'-P-CCNC: 13 U/L (ref 10–44)
ANION GAP SERPL CALC-SCNC: 8 MMOL/L (ref 8–16)
APTT PPP: 25.6 SEC (ref 21–32)
AST SERPL-CCNC: 33 U/L (ref 10–40)
BASOPHILS # BLD AUTO: 0.02 K/UL (ref 0–0.2)
BASOPHILS NFR BLD: 0.2 % (ref 0–1.9)
BILIRUB SERPL-MCNC: 0.5 MG/DL (ref 0.1–1)
BUN SERPL-MCNC: 13 MG/DL (ref 8–23)
CALCIUM SERPL-MCNC: 8 MG/DL (ref 8.7–10.5)
CHLORIDE SERPL-SCNC: 113 MMOL/L (ref 95–110)
CO2 SERPL-SCNC: 22 MMOL/L (ref 23–29)
CREAT SERPL-MCNC: 1 MG/DL (ref 0.5–1.4)
DELSYS: ABNORMAL
DELSYS: ABNORMAL
DIFFERENTIAL METHOD BLD: ABNORMAL
EOSINOPHIL # BLD AUTO: 0 K/UL (ref 0–0.5)
EOSINOPHIL NFR BLD: 0 % (ref 0–8)
ERYTHROCYTE [DISTWIDTH] IN BLOOD BY AUTOMATED COUNT: 15.7 % (ref 11.5–14.5)
EST. GFR  (NO RACE VARIABLE): >60 ML/MIN/1.73 M^2
FLOW: 1
FLOW: 1
GLUCOSE SERPL-MCNC: 101 MG/DL (ref 70–110)
HCO3 UR-SCNC: 24.1 MMOL/L (ref 24–28)
HCO3 UR-SCNC: 24.9 MMOL/L (ref 24–28)
HCO3 UR-SCNC: 25.5 MMOL/L (ref 24–28)
HCT VFR BLD AUTO: 26.5 % (ref 40–54)
HCT VFR BLD CALC: 22 %PCV (ref 36–54)
HCT VFR BLD CALC: 23 %PCV (ref 36–54)
HCT VFR BLD CALC: 27 %PCV (ref 36–54)
HGB BLD-MCNC: 8.8 G/DL (ref 14–18)
IMM GRANULOCYTES # BLD AUTO: 0.03 K/UL (ref 0–0.04)
IMM GRANULOCYTES NFR BLD AUTO: 0.4 % (ref 0–0.5)
INR PPP: 1.1 (ref 0.8–1.2)
LACTATE SERPL-SCNC: 0.9 MMOL/L (ref 0.5–2.2)
LACTATE SERPL-SCNC: 1.3 MMOL/L (ref 0.5–2.2)
LYMPHOCYTES # BLD AUTO: 0.7 K/UL (ref 1–4.8)
LYMPHOCYTES NFR BLD: 8.8 % (ref 18–48)
MAGNESIUM SERPL-MCNC: 1.7 MG/DL (ref 1.6–2.6)
MAGNESIUM SERPL-MCNC: 2.3 MG/DL (ref 1.6–2.6)
MAGNESIUM SERPL-MCNC: 2.5 MG/DL (ref 1.6–2.6)
MCH RBC QN AUTO: 30.2 PG (ref 27–31)
MCHC RBC AUTO-ENTMCNC: 33.2 G/DL (ref 32–36)
MCV RBC AUTO: 91 FL (ref 82–98)
MODE: ABNORMAL
MODE: ABNORMAL
MONOCYTES # BLD AUTO: 1 K/UL (ref 0.3–1)
MONOCYTES NFR BLD: 11.6 % (ref 4–15)
NEUTROPHILS # BLD AUTO: 6.6 K/UL (ref 1.8–7.7)
NEUTROPHILS NFR BLD: 79 % (ref 38–73)
NRBC BLD-RTO: 0 /100 WBC
PCO2 BLDA: 37.9 MMHG (ref 35–45)
PCO2 BLDA: 38.5 MMHG (ref 35–45)
PCO2 BLDA: 43.9 MMHG (ref 35–45)
PH SMN: 7.37 [PH] (ref 7.35–7.45)
PH SMN: 7.4 [PH] (ref 7.35–7.45)
PH SMN: 7.42 [PH] (ref 7.35–7.45)
PHOSPHATE SERPL-MCNC: 3.7 MG/DL (ref 2.7–4.5)
PLATELET # BLD AUTO: 167 K/UL (ref 150–450)
PMV BLD AUTO: 10.4 FL (ref 9.2–12.9)
PO2 BLDA: 55 MMHG (ref 80–100)
PO2 BLDA: 65 MMHG (ref 80–100)
PO2 BLDA: 75 MMHG (ref 80–100)
POC BE: -1 MMOL/L
POC BE: 0 MMOL/L
POC BE: 0 MMOL/L
POC IONIZED CALCIUM: 1.15 MMOL/L (ref 1.06–1.42)
POC IONIZED CALCIUM: 1.2 MMOL/L (ref 1.06–1.42)
POC IONIZED CALCIUM: 1.2 MMOL/L (ref 1.06–1.42)
POC SATURATED O2: 89 % (ref 95–100)
POC SATURATED O2: 93 % (ref 95–100)
POC SATURATED O2: 94 % (ref 95–100)
POC TCO2: 25 MMOL/L (ref 23–27)
POC TCO2: 26 MMOL/L (ref 23–27)
POC TCO2: 27 MMOL/L (ref 23–27)
POCT GLUCOSE: 100 MG/DL (ref 70–110)
POCT GLUCOSE: 100 MG/DL (ref 70–110)
POCT GLUCOSE: 102 MG/DL (ref 70–110)
POCT GLUCOSE: 103 MG/DL (ref 70–110)
POCT GLUCOSE: 105 MG/DL (ref 70–110)
POCT GLUCOSE: 110 MG/DL (ref 70–110)
POCT GLUCOSE: 115 MG/DL (ref 70–110)
POCT GLUCOSE: 115 MG/DL (ref 70–110)
POCT GLUCOSE: 116 MG/DL (ref 70–110)
POCT GLUCOSE: 126 MG/DL (ref 70–110)
POCT GLUCOSE: 135 MG/DL (ref 70–110)
POCT GLUCOSE: 70 MG/DL (ref 70–110)
POCT GLUCOSE: 85 MG/DL (ref 70–110)
POTASSIUM BLD-SCNC: 3.5 MMOL/L (ref 3.5–5.1)
POTASSIUM BLD-SCNC: 3.7 MMOL/L (ref 3.5–5.1)
POTASSIUM BLD-SCNC: 4 MMOL/L (ref 3.5–5.1)
POTASSIUM SERPL-SCNC: 2.9 MMOL/L (ref 3.5–5.1)
POTASSIUM SERPL-SCNC: 4.2 MMOL/L (ref 3.5–5.1)
POTASSIUM SERPL-SCNC: 4.5 MMOL/L (ref 3.5–5.1)
PROT SERPL-MCNC: 4.7 G/DL (ref 6–8.4)
PROTHROMBIN TIME: 11.9 SEC (ref 9–12.5)
RBC # BLD AUTO: 2.91 M/UL (ref 4.6–6.2)
SAMPLE: ABNORMAL
SITE: ABNORMAL
SODIUM BLD-SCNC: 144 MMOL/L (ref 136–145)
SODIUM BLD-SCNC: 144 MMOL/L (ref 136–145)
SODIUM BLD-SCNC: 145 MMOL/L (ref 136–145)
SODIUM SERPL-SCNC: 143 MMOL/L (ref 136–145)
WBC # BLD AUTO: 8.34 K/UL (ref 3.9–12.7)

## 2025-02-22 PROCEDURE — 85014 HEMATOCRIT: CPT

## 2025-02-22 PROCEDURE — 99232 SBSQ HOSP IP/OBS MODERATE 35: CPT | Mod: ,,, | Performed by: NURSE PRACTITIONER

## 2025-02-22 PROCEDURE — 85730 THROMBOPLASTIN TIME PARTIAL: CPT | Performed by: STUDENT IN AN ORGANIZED HEALTH CARE EDUCATION/TRAINING PROGRAM

## 2025-02-22 PROCEDURE — 84132 ASSAY OF SERUM POTASSIUM: CPT

## 2025-02-22 PROCEDURE — 25000003 PHARM REV CODE 250: Performed by: STUDENT IN AN ORGANIZED HEALTH CARE EDUCATION/TRAINING PROGRAM

## 2025-02-22 PROCEDURE — 37799 UNLISTED PX VASCULAR SURGERY: CPT

## 2025-02-22 PROCEDURE — 84100 ASSAY OF PHOSPHORUS: CPT

## 2025-02-22 PROCEDURE — 85610 PROTHROMBIN TIME: CPT | Performed by: STUDENT IN AN ORGANIZED HEALTH CARE EDUCATION/TRAINING PROGRAM

## 2025-02-22 PROCEDURE — 20600001 HC STEP DOWN PRIVATE ROOM

## 2025-02-22 PROCEDURE — 63600175 PHARM REV CODE 636 W HCPCS

## 2025-02-22 PROCEDURE — 82330 ASSAY OF CALCIUM: CPT

## 2025-02-22 PROCEDURE — 25000003 PHARM REV CODE 250

## 2025-02-22 PROCEDURE — 63600175 PHARM REV CODE 636 W HCPCS: Performed by: STUDENT IN AN ORGANIZED HEALTH CARE EDUCATION/TRAINING PROGRAM

## 2025-02-22 PROCEDURE — 36415 COLL VENOUS BLD VENIPUNCTURE: CPT

## 2025-02-22 PROCEDURE — 83735 ASSAY OF MAGNESIUM: CPT

## 2025-02-22 PROCEDURE — 83605 ASSAY OF LACTIC ACID: CPT | Performed by: THORACIC SURGERY (CARDIOTHORACIC VASCULAR SURGERY)

## 2025-02-22 PROCEDURE — 84295 ASSAY OF SERUM SODIUM: CPT

## 2025-02-22 PROCEDURE — 97165 OT EVAL LOW COMPLEX 30 MIN: CPT

## 2025-02-22 PROCEDURE — 94761 N-INVAS EAR/PLS OXIMETRY MLT: CPT | Mod: XB

## 2025-02-22 PROCEDURE — 85025 COMPLETE CBC W/AUTO DIFF WBC: CPT

## 2025-02-22 PROCEDURE — 99900035 HC TECH TIME PER 15 MIN (STAT)

## 2025-02-22 PROCEDURE — 97535 SELF CARE MNGMENT TRAINING: CPT

## 2025-02-22 PROCEDURE — 97161 PT EVAL LOW COMPLEX 20 MIN: CPT

## 2025-02-22 PROCEDURE — 80053 COMPREHEN METABOLIC PANEL: CPT

## 2025-02-22 PROCEDURE — 27000221 HC OXYGEN, UP TO 24 HOURS

## 2025-02-22 PROCEDURE — 94799 UNLISTED PULMONARY SVC/PX: CPT

## 2025-02-22 PROCEDURE — 97116 GAIT TRAINING THERAPY: CPT

## 2025-02-22 PROCEDURE — 82803 BLOOD GASES ANY COMBINATION: CPT

## 2025-02-22 PROCEDURE — 83605 ASSAY OF LACTIC ACID: CPT | Mod: 91 | Performed by: THORACIC SURGERY (CARDIOTHORACIC VASCULAR SURGERY)

## 2025-02-22 PROCEDURE — 83735 ASSAY OF MAGNESIUM: CPT | Mod: 91

## 2025-02-22 PROCEDURE — 82800 BLOOD PH: CPT

## 2025-02-22 RX ORDER — POTASSIUM CHLORIDE 20 MEQ/1
60 TABLET, EXTENDED RELEASE ORAL ONCE
Status: COMPLETED | OUTPATIENT
Start: 2025-02-22 | End: 2025-02-22

## 2025-02-22 RX ORDER — CLOPIDOGREL BISULFATE 75 MG/1
75 TABLET ORAL DAILY
Status: DISCONTINUED | OUTPATIENT
Start: 2025-02-22 | End: 2025-02-25 | Stop reason: HOSPADM

## 2025-02-22 RX ORDER — FUROSEMIDE 10 MG/ML
20 INJECTION INTRAMUSCULAR; INTRAVENOUS ONCE
Status: DISCONTINUED | OUTPATIENT
Start: 2025-02-22 | End: 2025-02-22

## 2025-02-22 RX ORDER — FUROSEMIDE 10 MG/ML
10 INJECTION INTRAMUSCULAR; INTRAVENOUS ONCE
Status: COMPLETED | OUTPATIENT
Start: 2025-02-22 | End: 2025-02-22

## 2025-02-22 RX ORDER — GLUCAGON 1 MG
1 KIT INJECTION
Status: DISCONTINUED | OUTPATIENT
Start: 2025-02-22 | End: 2025-02-24

## 2025-02-22 RX ORDER — METOPROLOL TARTRATE 25 MG/1
12.5 TABLET ORAL 2 TIMES DAILY
Status: DISCONTINUED | OUTPATIENT
Start: 2025-02-22 | End: 2025-02-25

## 2025-02-22 RX ORDER — INSULIN ASPART 100 [IU]/ML
0-10 INJECTION, SOLUTION INTRAVENOUS; SUBCUTANEOUS
Status: DISCONTINUED | OUTPATIENT
Start: 2025-02-22 | End: 2025-02-24

## 2025-02-22 RX ORDER — FUROSEMIDE 10 MG/ML
20 INJECTION INTRAMUSCULAR; INTRAVENOUS ONCE
Status: COMPLETED | OUTPATIENT
Start: 2025-02-22 | End: 2025-02-22

## 2025-02-22 RX ORDER — ACETAMINOPHEN 500 MG
1000 TABLET ORAL EVERY 6 HOURS
Status: DISCONTINUED | OUTPATIENT
Start: 2025-02-22 | End: 2025-02-25 | Stop reason: HOSPADM

## 2025-02-22 RX ORDER — IBUPROFEN 200 MG
24 TABLET ORAL
Status: DISCONTINUED | OUTPATIENT
Start: 2025-02-22 | End: 2025-02-24

## 2025-02-22 RX ORDER — IBUPROFEN 200 MG
16 TABLET ORAL
Status: DISCONTINUED | OUTPATIENT
Start: 2025-02-22 | End: 2025-02-24

## 2025-02-22 RX ADMIN — METOPROLOL TARTRATE 12.5 MG: 25 TABLET, FILM COATED ORAL at 10:02

## 2025-02-22 RX ADMIN — ONDANSETRON 4 MG: 2 INJECTION INTRAMUSCULAR; INTRAVENOUS at 05:02

## 2025-02-22 RX ADMIN — FAMOTIDINE 20 MG: 10 INJECTION, SOLUTION INTRAVENOUS at 08:02

## 2025-02-22 RX ADMIN — POTASSIUM CHLORIDE 60 MEQ: 1500 TABLET, EXTENDED RELEASE ORAL at 12:02

## 2025-02-22 RX ADMIN — DOCUSATE SODIUM 100 MG: 100 CAPSULE, LIQUID FILLED ORAL at 09:02

## 2025-02-22 RX ADMIN — METOPROLOL TARTRATE 12.5 MG: 25 TABLET, FILM COATED ORAL at 09:02

## 2025-02-22 RX ADMIN — FUROSEMIDE 20 MG: 10 INJECTION, SOLUTION INTRAVENOUS at 02:02

## 2025-02-22 RX ADMIN — MAGNESIUM SULFATE HEPTAHYDRATE 2 G: 40 INJECTION, SOLUTION INTRAVENOUS at 12:02

## 2025-02-22 RX ADMIN — ACETAMINOPHEN 650 MG: 325 TABLET ORAL at 05:02

## 2025-02-22 RX ADMIN — POLYETHYLENE GLYCOL 3350 17 G: 17 POWDER, FOR SOLUTION ORAL at 08:02

## 2025-02-22 RX ADMIN — MUPIROCIN: 20 OINTMENT TOPICAL at 09:02

## 2025-02-22 RX ADMIN — CEFAZOLIN 2 G: 2 INJECTION, POWDER, FOR SOLUTION INTRAMUSCULAR; INTRAVENOUS at 02:02

## 2025-02-22 RX ADMIN — LORAZEPAM 0.5 MG: 0.5 TABLET ORAL at 09:02

## 2025-02-22 RX ADMIN — LORAZEPAM 0.5 MG: 0.5 TABLET ORAL at 08:02

## 2025-02-22 RX ADMIN — ASPIRIN 81 MG CHEWABLE TABLET 81 MG: 81 TABLET CHEWABLE at 08:02

## 2025-02-22 RX ADMIN — MUPIROCIN: 20 OINTMENT TOPICAL at 08:02

## 2025-02-22 RX ADMIN — FAMOTIDINE 20 MG: 10 INJECTION, SOLUTION INTRAVENOUS at 09:02

## 2025-02-22 RX ADMIN — CEFAZOLIN 2 G: 2 INJECTION, POWDER, FOR SOLUTION INTRAMUSCULAR; INTRAVENOUS at 09:02

## 2025-02-22 RX ADMIN — ATORVASTATIN CALCIUM 40 MG: 40 TABLET, FILM COATED ORAL at 08:02

## 2025-02-22 RX ADMIN — DOCUSATE SODIUM 100 MG: 100 CAPSULE, LIQUID FILLED ORAL at 08:02

## 2025-02-22 RX ADMIN — FUROSEMIDE 10 MG: 10 INJECTION, SOLUTION INTRAVENOUS at 10:02

## 2025-02-22 RX ADMIN — LAMOTRIGINE 200 MG: 100 TABLET ORAL at 08:02

## 2025-02-22 RX ADMIN — CLOPIDOGREL BISULFATE 75 MG: 75 TABLET ORAL at 10:02

## 2025-02-22 RX ADMIN — TAMSULOSIN HYDROCHLORIDE 0.4 MG: 0.4 CAPSULE ORAL at 08:02

## 2025-02-22 RX ADMIN — ACETAMINOPHEN 1000 MG: 500 TABLET ORAL at 05:02

## 2025-02-22 RX ADMIN — ACETAMINOPHEN 1000 MG: 500 TABLET ORAL at 11:02

## 2025-02-22 NOTE — SUBJECTIVE & OBJECTIVE
Interval History/Significant Events:     NAEON.     POD1. Tolerated NC well o/n.     Follow-up For: Procedure(s) (LRB):  CORONARY ARTERY BYPASS GRAFT (CABG) (N/A)  HARVEST-VEIN-ENDOVASCULAR (Left)    Post-Operative Day: 1 Day Post-Op    Objective:     Vital Signs (Most Recent):  Temp: 98.6 °F (37 °C) (02/22/25 0701)  Pulse: 84 (02/22/25 0715)  Resp: (!) 23 (02/22/25 0715)  BP: (!) 114/55 (02/22/25 0700)  SpO2: 95 % (02/22/25 0715) Vital Signs (24h Range):  Temp:  [97.5 °F (36.4 °C)-98.8 °F (37.1 °C)] 98.6 °F (37 °C)  Pulse:  [52-90] 84  Resp:  [11-29] 23  SpO2:  [91 %-100 %] 95 %  BP: ()/(54-72) 114/55  Arterial Line BP: ()/(49-70) 125/57     Weight: 66.6 kg (146 lb 13.2 oz)  Body mass index is 22.32 kg/m².      Intake/Output Summary (Last 24 hours) at 2/22/2025 0811  Last data filed at 2/22/2025 0723  Gross per 24 hour   Intake 6518.17 ml   Output 2835 ml   Net 3683.17 ml          Physical Exam  Vitals and nursing note reviewed.   Constitutional:       General: He is awake.      Appearance: Normal appearance.   HENT:      Head: Normocephalic and atraumatic.      Nose: Nose normal.   Eyes:      Conjunctiva/sclera: Conjunctivae normal.   Cardiovascular:      Rate and Rhythm: Normal rate and regular rhythm.      Pulses: Normal pulses.      Comments: Patient with A-line  Abdominal:      General: Abdomen is flat.      Palpations: Abdomen is soft.   Musculoskeletal:         General: Normal range of motion.      Cervical back: Normal range of motion.      Right lower leg: No edema.      Left lower leg: No edema.   Skin:     General: Skin is warm and dry.      Comments: Large post surgical scar on anterior chest   Neurological:      Mental Status: He is alert. Mental status is at baseline.            Vents:  Vent Mode: A/C (02/21/25 1524)  Ventilator Initiated: Yes (02/21/25 1524)  Set Rate: 16 BPM (02/21/25 1736)  Vt Set: 450 mL (02/21/25 1736)  Pressure Support: 8 cmH20 (02/21/25 1736)  PEEP/CPAP: 5 cmH20  (02/21/25 1736)  Oxygen Concentration (%): 40 (02/21/25 1815)  Peak Airway Pressure: 24 cmH20 (02/21/25 1736)  Plateau Pressure: 0 cmH20 (02/21/25 1736)  Total Ve: 9.3 L/m (02/21/25 1736)  Negative Inspiratory Force (cm H2O): 0 (02/21/25 1736)  F/VT Ratio<105 (RSBI): (!) 61.16 (02/21/25 1736)    Lines/Drains/Airways       Central Venous Catheter Line  Duration                  Percutaneous Central Line Insertion/Assessment - Quad lumen  02/21/25 1025 Subclavian Left <1 day    Percutaneous Central Line - Quad Lumen  02/21/25 1025 Subclavian Left <1 day              Drain  Duration                  Chest Tube 02/21/25 1401 Tube - 1 Left Mediastinal <1 day         Chest Tube 02/21/25 1401 Tube - 2 Right Mediastinal <1 day         Chest Tube 02/21/25 1401 Tube - 3 Left Pleural <1 day         Urethral Catheter 02/21/25 0949 Silicone;Temperature probe;Straight-tip;Non-latex 14 Fr. <1 day              Arterial Line  Duration             Arterial Line 02/21/25 1024 Left Radial <1 day              Line  Duration                  Pacer Wires 02/21/25 1401 <1 day              Peripheral Intravenous Line  Duration                  Peripheral IV - Single Lumen 02/19/25 1542 20 G Left;Posterior Forearm 2 days         Peripheral IV - Single Lumen 02/21/25 0948 16 G Right Antecubital <1 day                    Significant Labs:    CBC/Anemia Profile:  Recent Labs   Lab 02/21/25  1407 02/21/25  1407 02/21/25  1537 02/21/25  1638 02/22/25  0027 02/22/25  0309 02/22/25  0406   WBC 11.02  --  8.31  --   --  8.34  --    HGB 8.4*  --  10.1*  --   --  8.8*  --    HCT 25.2*   < > 30.5*   < > 22* 26.5* 23*   *  --  159  --   --  167  --    MCV 91  --  90  --   --  91  --    RDW 15.2*  --  15.1*  --   --  15.7*  --     < > = values in this interval not displayed.        Chemistries:  Recent Labs   Lab 02/21/25  0639 02/21/25  1537 02/22/25  0309    140 143   K 4.4 3.9 4.2    111* 113*   CO2 28 20* 22*   BUN 16 11 13    CREATININE 1.1 0.9 1.0   CALCIUM 8.7 8.1* 8.0*   ALBUMIN 3.4* 2.3* 3.3*   PROT 5.5* 3.8* 4.7*   BILITOT 0.4 0.6 0.5   ALKPHOS 67 46 42   ALT 15 12 13   AST 25 32 33   MG 2.2 2.9* 2.3   PHOS 2.9 2.2* 3.7       All pertinent labs within the past 24 hours have been reviewed.    Significant Imaging:  I have reviewed all pertinent imaging results/findings within the past 24 hours.

## 2025-02-22 NOTE — ASSESSMENT & PLAN NOTE
S/p CABG x2 w/ Dr. Leal on 2/21.    Neuro/Psych:   - Sedation:   - Pain:     - Scheduled Tylenol 1g q8h    - Fentanyl PRN while intubated, Oxy PRN   - Home lamotrigine 200mg qd and ativan 0.5mg BID                Cardiac:   - BP Goal: MAP 65-85  - Pressors: none  - Rhythm: NSR  - Cleviprex/Cardene PRN  - Anti-HTNs: Resume as appropriate. No home meds  - Beta blocker: Resume as appropriate. No home meds  - Statin: Atorvastatin 40 mg QD    Pulmonary:   - Goal SpO2 >92%  - Will wean ventilator support as tolerated to extubate  - ABGs q1h x4. Will decrease if stable  - Chest Tubes x 3 (2 Meds & 1 Pleural)      Renal:      - Maintain Snell, record strict Is/Os      - Flomax qd  Recent Labs   Lab 02/21/25  0639 02/21/25  1537 02/22/25  0309   BUN 16 11 13   CREATININE 1.1 0.9 1.0          FEN / GI:     - Daily CMP, Mag/Phos    - Replace electrolytes as needed    - Nutrition: CLD    - Bowel Regimen: Miralax, docusate    - Famotidine BID    - Q2h Lactate     ID:   - Afebrile  - Abx: Complete perioperative cefazolin 2g Q8H x 5 doses  Recent Labs   Lab 02/21/25  1407 02/21/25  1537 02/22/25  0309   WBC 11.02 8.31 8.34         Heme/Onc:   - Hgb 14.1 pre-operatively  - CBC, PTT/INR daily  - DVT ppx: SCDs, ASA  - ASA 81mg daily. First dose w/in 6h of surgery  - Q4h fibrinogen. Goal >300  Recent Labs   Lab 02/20/25  1158 02/21/25  0640 02/21/25  1407 02/21/25  1537 02/22/25  0309   HGB  --    < > 8.4* 10.1* 8.8*   PLT  --    < > 135* 159 167   APTT 40.3*  40.3*  --   --  27.7 25.6   INR  --   --  1.7* 1.2 1.1    < > = values in this interval not displayed.         Endocrine:   - CTS Goal -140  - HgbA1c: 5.2  - Endocrinology consulted for insulin management     PPx:   Feeding: CLD  Analgesia/Sedation: MMPC  Thromboembolic Prevention: SCDs, ASA  HOB >30: Yes  Stress Ulcer: famotidine BID  Glucose Control: Yes, insulin management per Endocrinology     Lines/Drains/Airway:  Left radial arterial line  Left subclavian  CVC  Snell  Chest Tubes: 3 (2 Mediastinal, 1 Pleural)   Pacing Wires: Temporary ventricular pacing wires     Dispo/Code Status/Palliative:    - SICU   - Full Code

## 2025-02-22 NOTE — PLAN OF CARE
"      SICU PLAN OF CARE NOTE    Dx: S/P CABG (coronary artery bypass graft)    Shift Events: S/P CABG x2. Patient trailing to wean off of vent. Patient did initially become hypertensive, treated with Cardene at low dose. Patient arrived to unit and awaited for orders and obtained labs from patient. Started to follow commands, and attempted extubation and successful 1815.     Goals of Care: Extubation, follow commands, sit up in bed, and allow for better perfusion post operation.    Neuro: Sedated    Vital Signs: /62   Pulse 75   Temp 98.2 °F (36.8 °C) (Oral)   Resp 17   Ht 5' 8" (1.727 m)   Wt 61.6 kg (135 lb 12.9 oz)   SpO2 100%   BMI 20.65 kg/m²     Cardiac:     Respiratory: Ventilator    Diet: NPO    Gtts: Propfol, Precedex, and Nicardipine    Urine Output: Urinary Catheter 300 cc/2 hours    Drains: Chest Tube, total output 120 cc /  2 hours            Labs/Accuchecks: Accuchecks below 140, mag 2.9, phos 2.2, fibrogen 267, H&H 10 & 31.    Skin: Warm/Dry   "

## 2025-02-22 NOTE — SUBJECTIVE & OBJECTIVE
"Interval HPI:   Overnight events: No acute events overnight. Patient in room 85122 [facility]CVICU/77420 [facility]*. Blood glucose stable. BG at goal on current insulin regimen (SSI ). Steroid use- None. 1 Day Post-Op  Renal function- Normal   Vasopressors-  None       Endocrine will continue to follow and manage insulin orders inpatient.         Diet Cardiac Standard Tray     Eatin%  Nausea: No  Hypoglycemia and intervention: No  Fever: No  TPN and/or TF: No      BP (!) 125/54   Pulse 79   Temp 98.6 °F (37 °C) (Oral)   Resp 15   Ht 5' 8" (1.727 m)   Wt 66.6 kg (146 lb 13.2 oz)   SpO2 98%   BMI 22.32 kg/m²     Labs Reviewed and Include    Recent Labs   Lab 25  030      CALCIUM 8.0*   ALBUMIN 3.3*   PROT 4.7*      K 4.2   CO2 22*   *   BUN 13   CREATININE 1.0   ALKPHOS 42   ALT 13   AST 33   BILITOT 0.5     Lab Results   Component Value Date    WBC 8.34 2025    HGB 8.8 (L) 2025    HCT 27 (L) 2025    MCV 91 2025     2025     No results for input(s): "TSH", "FREET4" in the last 168 hours.  Lab Results   Component Value Date    HGBA1C 5.2 2025       Nutritional status:   Body mass index is 22.32 kg/m².  Lab Results   Component Value Date    ALBUMIN 3.3 (L) 2025    ALBUMIN 2.3 (L) 2025    ALBUMIN 3.4 (L) 2025     No results found for: "PREALBUMIN"    Estimated Creatinine Clearance: 65.7 mL/min (based on SCr of 1 mg/dL).    Accu-Checks  Recent Labs     25  0007 25  0108 25  0212 25  0308 25  0404 25  0505 25  0608 25  0714 25  0717 25  0742   POCTGLUCOSE 115* 103 102 105 100 115* 116* 70 85 100       Current Medications and/or Treatments Impacting Glycemic Control  Immunotherapy:    Immunosuppressants       None          Steroids:   Hormones (From admission, onward)      Start     Stop Route Frequency Ordered    25 1530  melatonin tablet 6 mg         -- Oral Nightly PRN " 02/19/25 1430          Pressors:    Autonomic Drugs (From admission, onward)      Start     Stop Route Frequency Ordered    02/22/25 1100  metoprolol tartrate (LOPRESSOR) split tablet 12.5 mg         -- Oral 2 times daily 02/22/25 0950          Hyperglycemia/Diabetes Medications:   Antihyperglycemics (From admission, onward)      Start     Stop Route Frequency Ordered    02/22/25 0924  insulin aspart U-100 pen 0-10 Units         -- SubQ Before meals & nightly PRN 02/22/25 0824

## 2025-02-22 NOTE — CONSULTS
"  [facility]  Adult Nutrition  Consult Note    SUMMARY     Recommendations  1. Continue Cardiac diet  2. RD following    Goals: Meet % of EEN/EPN by RD f/u date  Nutrition Goal Status: goal not met  Communication of RD Recs: POC    Assessment and Plan    Nutrition Problem  Increased nutrient needs (protein)     Related to (etiology):  Increased demand for protein 2/2 physiological needs/wound healing     Signs and Symptoms (as evidenced by):  S/p CABG     Interventions/Recommendations (treatment strategy):  Collaboration of nutritional care with other providers.   Diet edu when appropriate  ONS PRN     Nutrition Diagnosis Status:  Continues         Reason for Assessment    Reason For Assessment: consult  Diagnosis: other (see comments) (s/p  CABG)  General Information Comments: . Ye Partida is a 69M with a PMHx of CAD, HTN, seizure disorder, and peripheral neuropathy who [place] [facility] [place] 3 [place]. The patient presents to the SICU s/p CABG x2 with Dr. Leal on 02/21/2025. RD consulted for s/p cardiac surgery. Pt advanced to a cardiac diet today. Pt drowsy at bedside, unable to obtain much information at bedside, nutrition PTA. NFPE at follow-up if clinically warranted, RD following.   Nutrition Discharge Planning: adequate nutritional intake/cardiac diet education when medically appropriate  Nutrition Related Social Determinants of Health: SDOH: None Identified      Nutrition/Diet History    Spiritual, Cultural Beliefs, Evangelical Practices, Values that Affect Care: no  Food Allergies: NKFA    Anthropometrics    Height: 5' 8" (172.7 cm)  Height (inches): 68 in  Height Method: Stated  Weight: 66.6 kg (146 lb 13.2 oz)  Weight (lb): 146.83 lb  Weight Method: Bed Scale  Ideal Body Weight (IBW), Male: 154 lb  % Ideal Body Weight, Male (lb): 88.18 %  BMI (Calculated): 22.3  BMI Grade: 18.5-24.9 - normal       Lab/Procedures/Meds    Pertinent Labs Reviewed: " reviewed  Pertinent Labs Comments: -  Pertinent Medications Reviewed: reviewed  Pertinent Medications Comments: -    Estimated/Assessed Needs    Weight Used For Calorie Calculations: 66.6 kg (146 lb 13.2 oz)  Energy Calorie Requirements (kcal): 1998  Energy Need Method: Kcal/kg (30 kcal/kg)  Protein Requirements: 100 g (1.5 g/kg)  Weight Used For Protein Calculations: 66.6 kg (146 lb 13.2 oz)        RDA Method (mL): 1998         Nutrition Prescription Ordered    Current Diet Order: Cardiac diet    Evaluation of Received Nutrient/Fluid Intake    I/O: -  Energy Calories Required: not meeting needs  Protein Required: not meeting needs  Fluid Required: not meeting needs  Total Fluid Intake (mL/kg): 1 ml or fluid per MD  Tolerance: tolerating  % Intake of Estimated Energy Needs: diet just advanced   % Meal Intake: diet just advanced     Nutrition Risk    Level of Risk/Frequency of Follow-up: low ((1-2x/week))     Monitor and Evaluation    Food and Nutrient Intake: energy intake, food and beverage intake  Food and Nutrient Adminstration: diet order, enteral and parenteral nutrition administration  Knowledge/Beliefs/Attitudes: food and nutrition knowledge/skill, beliefs and attitudes  Physical Activity and Function: nutrition-related ADLs and IADLs, factors affecting access to physical activity  Anthropometric Measurements: height/length, weight, weight change, body mass index, growth pattern indices/percentile ranks  Biochemical Data, Medical Tests and Procedures: electrolyte and renal panel, gastrointestinal profile, glucose/endocrine profile, inflammatory profile, other (specify), lipid profile  Nutrition-Focused Physical Findings: overall appearance, extremities, muscles and bones, head and eyes, skin       Nutrition Follow-Up    RD Follow-up?: Yes

## 2025-02-22 NOTE — PLAN OF CARE
"      SICU PLAN OF CARE NOTE    Dx: S/P CABG (coronary artery bypass graft)    Shift Events: No acute event overnight. Require low dose Cardene overnight, now off at 0600. Wean off O2. OOBTC.    Goals of Care: MAP 65-85.     Neuro: AAO x4, Follows Commands, and Moves All Extremities    Vital Signs: BP (!) 107/59   Pulse 75   Temp 98.6 °F (37 °C) (Oral)   Resp 12   Ht 5' 8" (1.727 m)   Wt 66.6 kg (146 lb 13.2 oz)   SpO2 96%   BMI 22.32 kg/m²     Cardiac: SR, CVP 11-12-12    Respiratory: Room Air    Diet: NPO    Gtts: Insulin    Urine Output: Urinary Catheter 30-75 cc/hour    Drains: Chest Tube, total output 305 cc /  shift     Labs/Accuchecks: Q1H BSG, Q4H ABGs, daily labs.    Skin: Pt turned independently, mattress inflated, bony prominences protected, no new skin issues.      "

## 2025-02-22 NOTE — PT/OT/SLP EVAL
Physical Therapy  Co-Evaluation and treatment with OT    Patient Name:  Ye Partida   MRN:  [id number]    Recommendations:     Discharge Recommendations: No Therapy Indicated   Discharge Equipment Recommendations: none   Barriers to discharge: None    Assessment:     Ye Partida is a 69 y.o. male admitted with a medical diagnosis of S/P CABG (coronary artery bypass graft).  He presents with the following impairments/functional limitations: impaired endurance, impaired functional mobility, gait instability, impaired balance, decreased safety awareness pt tolerated treatment well and will benefit from skilled PT 5x/wk to progress physically. Pt will be able to discharge home with no needs when medically stable. Pt is s/p CABG x 2 on 2/21/25.    Rehab Prognosis: Good; patient would benefit from acute skilled PT services to address these deficits and reach maximum level of function.    Recent Surgery: Procedure(s) (LRB):  CORONARY ARTERY BYPASS GRAFT (CABG) (N/A)  HARVEST-VEIN-ENDOVASCULAR (Left) 1 Day Post-Op    Plan:     During this hospitalization, patient to be seen 5 x/week to address the identified rehab impairments via gait training, therapeutic activities and progress toward the following goals:    Plan of Care Expires:  03/20/25    Subjective     Chief Complaint: pt c/o pain during treatment.    Patient/Family Comments/goals: to get better and go home.   Pain/Comfort:  Pain Rating 1: 2/10 (chest)  Pain Addressed 1: Distraction  Pain Rating Post-Intervention 1: 2/10 (chest)    Patients cultural, spiritual, Moravian conflicts given the current situation: no    Living Environment:  Pt is retired and lives with his retired wife in 2 story with B& B downstairs and slab entrance.   Prior to admission, patients level of function was independent .  Equipment used at home: bath bench (walk in shower).  DME owned (not currently used): none.  Upon discharge, patient will have assistance from wife  .    Objective:     Communicated with nurse  prior to session.  Patient found supine with telemetry, arterial line, pulse ox (continuous), blood pressure cuff, external pacer, chest tube, south catheter  upon PT entry to room.    General Precautions: Standard, fall, sternal  Orthopedic Precautions:    Braces:    Respiratory Status: Room air    Exams:  Cognitive Exam:  Patient is oriented to Person, Place, Time, and Situation  RLE ROM: WFL  RLE Strength: WFL  LLE ROM: WFL  LLE Strength: WFL    Functional Mobility:  Bed Mobility:   pt needed verbal cues for functional mobility with sternal precautions.   Rolling Right: stand by assistance  Supine to Sit: stand by assistance    Transfers:     Sit to Stand:  stand by assistance with hand-held assist    Gait: pt received gait training ~ 24 ft with HHA x 2.     Balance: Pt stood at sink and performed ADLS with OT with SBA.     Due to pt complex medical condition, the skill of 2 licensed therapists is needed to maximize treatment session and progression towards goals  Pt white board updated with current therapists name and level of mobility assistance needed.         AM-PAC 6 CLICK MOBILITY  Total Score:18       Treatment & Education:  Pt and son received verbal instructions in role of PT, POC and sternal precautions. Both verbally understood.     Patient left up in chair with all lines intact, call button in reach, RN  notified, and son  present.    GOALS:   Multidisciplinary Problems       Physical Therapy Goals          Problem: Physical Therapy    Goal Priority Disciplines Outcome Interventions   Physical Therapy Goal     PT, PT/OT Progressing    Description: Goals to be met by: 3/3/25     Patient will increase functional independence with mobility by performin. Supine to sit with independent   2. Sit to stand transfer with Supervision  3. Gait  x 250 feet with Supervision .                          DME Justifications:  No DME recommended requiring DME  justifications    History:     Past Medical History:   Diagnosis Date    Hyperlipidemia     Pyloric stenosis, congenital     s/p repair    Seizures        Past Surgical History:   Procedure Laterality Date    CORONARY ANGIOGRAPHY Right 2/19/2025    Procedure: ANGIOGRAM, CORONARY ARTERY;  Surgeon: Ari Latham III, MD;  Location: [facility] CATH LAB;  Service: Cardiology;  Laterality: Right;    CORONARY ARTERY BYPASS GRAFT (CABG) N/A 2/21/2025    Procedure: CORONARY ARTERY BYPASS GRAFT (CABG);  Surgeon: Flavio Leal MD;  Location: [facility] OR [address] [place] [address];  Service: Cardiovascular;  Laterality: N/A;  CABG x 2  LIMA TO LAD  SVG TO RCA    ENDOSCOPIC HARVEST OF VEIN Left 2/21/2025    Procedure: HARVEST-VEIN-ENDOVASCULAR;  Surgeon: Flavio Leal MD;  Location: [facility] OR [address] [place] [address];  Service: Cardiovascular;  Laterality: Left;  VEIN START-1043  VEIN OUT-1112  VEIN PREP START-1113  VEIN READY-1131    pyloric stenosis      TONSILLECTOMY         Time Tracking:     PT Received On: 02/22/25  PT Start Time: 1047     PT Stop Time: 1110  PT Total Time (min): 23 min     Billable Minutes: Evaluation 8 min  and Gait Training 15 min       02/22/2025

## 2025-02-22 NOTE — PLAN OF CARE
Please merge this note with today's resident critical care note.    SICU Staff Addendum  I have reviewed and concur with the resident's/NP's history, physical, assessment, and plan.  I have personally interviewed and examined the patient at bedside.  See below for any additional findings/changes.    Reason for admission:  S/P CABG (coronary artery bypass graft)  Present on Admission:   Chest pain   Mixed hyperlipidemia   Primary hypertension   Generalized convulsive epilepsy with intractable epilepsy   CAD (coronary artery disease)      Active issues/Goals for Today:     POD 1 s/p CABGx2.      - Pain control is adequate. Continue anti-epileptic medications.  - ASA, BB, statin. Adding plavix for circumflex stenosis as per Dr. Leal.  - On room air. IS.  - ADAT. Bowel regimen.  - Diuresis with low dose lasix as per Dr. Leal.  - Chest tubes and wires as per Dr. Leal.  - Monitor Hgb and aim for Hgb > 7   - Stable for the floor.      Duc Carrera MD  Anesthesiology/Critical Care

## 2025-02-22 NOTE — PLAN OF CARE
Problem: Occupational Therapy  Goal: Occupational Therapy Goal  Description: Goals to be met by: 3/1/25     Patient will increase functional independence with ADLs by performing:    Feeding with [place].  UE Dressing with Set-up Assistance.  LE Dressing with Set-up Assistance.  Grooming while standing at sink with Set-up Assistance.  Toileting from toilet with Supervision for hygiene and clothing management.   Toilet transfer to toilet with Supervision.    Outcome: Progressing

## 2025-02-22 NOTE — PLAN OF CARE
Recommendations  1. Continue Cardiac diet  2. RD following     Goals: Meet % of EEN/EPN by RD f/u date  Nutrition Goal Status: goal not met  Communication of RD Recs: POC

## 2025-02-22 NOTE — PROGRESS NOTES
"[facility] - [facility]  Endocrinology  Progress Note    Admit Date: 2025     Reason for Consult: Management of Hyperglycemia     Surgical Procedure and Date: s/p CABG x2 on 2025    Patient is not diabetic and does not currently take any oral/injectable antidiabetic/hypoglycemic medications.   Lab Results   Component Value Date    HGBA1C 5.2 2025         HPI: Mr. Ye Partida is a 69M with a PMHx of CAD, HTN, seizure disorder, and peripheral neuropathy who [place] [facility] [place] having chest pain for 3 days. The patient presents to the SICU s/p CABG x2 with Dr. Leal on 2025. On admission, they are intubated, sedated with propofol and precedex, and in stable condition. Endocrine consulted to manage hyperglycemia in the context of CTS.           Interval HPI:   Overnight events: No acute events overnight. Patient in room 62002 TCVICU/57448 TCVIC*. Blood glucose stable. BG at goal on current insulin regimen (SSI ). Steroid use- None. 1 Day Post-Op  Renal function- Normal   Vasopressors-  None       Endocrine will continue to follow and manage insulin orders inpatient.         Diet Cardiac Standard Tray     Eatin%  Nausea: No  Hypoglycemia and intervention: No  Fever: No  TPN and/or TF: No      BP (!) 125/54   Pulse 79   Temp 98.6 °F (37 °C) (Oral)   Resp 15   Ht 5' 8" (1.727 m)   Wt 66.6 kg (146 lb 13.2 oz)   SpO2 98%   BMI 22.32 kg/m²     Labs Reviewed and Include    Recent Labs   Lab 25  0309      CALCIUM 8.0*   ALBUMIN 3.3*   PROT 4.7*      K 4.2   CO2 22*   *   BUN 13   CREATININE 1.0   ALKPHOS 42   ALT 13   AST 33   BILITOT 0.5     Lab Results   Component Value Date    WBC 8.34 2025    HGB 8.8 (L) 2025    HCT 27 (L) 2025    MCV 91 2025     2025     No results for input(s): "TSH", "FREET4" in the last 168 hours.  Lab Results   Component Value Date    HGBA1C 5.2 2025       Nutritional status:   Body " "mass index is 22.32 kg/m².  Lab Results   Component Value Date    ALBUMIN 3.3 (L) 02/22/2025    ALBUMIN 2.3 (L) 02/21/2025    ALBUMIN 3.4 (L) 02/21/2025     No results found for: "PREALBUMIN"    Estimated Creatinine Clearance: 65.7 mL/min (based on SCr of 1 mg/dL).    Accu-Checks  Recent Labs     02/22/25  0007 02/22/25  0108 02/22/25  0212 02/22/25  0308 02/22/25  0404 02/22/25  0505 02/22/25  0608 02/22/25  0714 02/22/25  0717 02/22/25  0742   POCTGLUCOSE 115* 103 102 105 100 115* 116* 70 85 100       Current Medications and/or Treatments Impacting Glycemic Control  Immunotherapy:    Immunosuppressants       None          Steroids:   Hormones (From admission, onward)      Start     Stop Route Frequency Ordered    02/19/25 1530  melatonin tablet 6 mg         -- Oral Nightly PRN 02/19/25 1430          Pressors:    Autonomic Drugs (From admission, onward)      Start     Stop Route Frequency Ordered    02/22/25 1100  metoprolol tartrate (LOPRESSOR) split tablet 12.5 mg         -- Oral 2 times daily 02/22/25 0950          Hyperglycemia/Diabetes Medications:   Antihyperglycemics (From admission, onward)      Start     Stop Route Frequency Ordered    02/22/25 0924  insulin aspart U-100 pen 0-10 Units         -- SubQ Before meals & nightly PRN 02/22/25 0824            ASSESSMENT and PLAN    Cardiac/Vascular  * S/P CABG (coronary artery bypass graft)  Optimize BG control to improve wound healing        Primary hypertension  Uncontrolled HTN can worsen insulin resistance.         Endocrine  Transient hyperglycemia post procedure  Endocrinology consulted for BG management.   BG goal 110-140 (CTS Protocol)     - D/C IIP  - Novolog (Insulin Aspart) prn for BG excursions [facility] SSI (150/25)  - BG checks AC/HS  - Hypoglycemia protocol in place      ** Please notify Endocrine for any change and/or advance in diet**  ** Please call Endocrine for any BG related issues **    Discharge Planning:   TBD. Please notify endocrinology prior " to discharge.             King Lundy, DNP, FNP  Endocrinology  [facility]

## 2025-02-22 NOTE — PLAN OF CARE
Problem: Adult Inpatient Plan of Care  Goal: Absence of Hospital-Acquired Illness or Injury  Outcome: Progressing     Problem: Wound  Goal: Optimal Coping  Outcome: Progressing  Goal: Optimal Functional Ability  Outcome: Progressing  Goal: Absence of Infection Signs and Symptoms  Outcome: Progressing  Goal: Optimal Pain Control and Function  Outcome: Progressing  Goal: Skin Health and Integrity  Outcome: Progressing  Goal: Optimal Wound Healing  Outcome: Progressing

## 2025-02-22 NOTE — ASSESSMENT & PLAN NOTE
Endocrinology consulted for BG management.   BG goal 110-140 (CTS Protocol)     - D/C IIP  - Novolog (Insulin Aspart) prn for BG excursions [facility] [place] SSI (150/25)  - BG checks AC/HS  - Hypoglycemia protocol in place      ** Please notify Endocrine for any change and/or advance in diet**  ** Please call Endocrine for any BG related issues **    Discharge Planning:   TBD. Please notify endocrinology prior to discharge.

## 2025-02-22 NOTE — NURSING TRANSFER
Nursing Transfer Note      2/22/2025   2:45 PM    Nurse giving handoff:Prosper ARGUETA  Nurse receiving handoff:Celestine ARGUETA    Reason patient is being transferred: SD orders    Transfer To:      Transfer via wheelchair    Transfer with cardiac monitoring    Transported by RN    Transfer Vital Signs:  Blood Pressure:125/61  Heart Rate:74  O2:96  Temperature:98.7  Respirations:20    Telemetry: Box Number [id number], Rate 71, Rhythm NSR C PACs, and Telemetry  [place]  Order for Tele Monitor? Yes    Additional Lines: Chest Tube and Snell Catheter    Medicines sent: muporicin    Any special needs or follow-up needed: n/a    Patient belongings transferred with patient: Yes    Chart send with patient: Yes    Notified: Spouse, deferred to patient via text    Patient reassessed at:  (date, time)  1  Upon arrival to floor: cardiac monitor applied, patient oriented to room, call bell in reach, and bed in lowest position

## 2025-02-22 NOTE — PT/OT/SLP EVAL
Occupational Therapy  Co- Evaluation    Name: Ye Partida  MRN: [id number]  Admitting Diagnosis: S/P CABG (coronary artery bypass graft)  Recent Surgery: Procedure(s) (LRB):  CORONARY ARTERY BYPASS GRAFT (CABG) (N/A)  HARVEST-VEIN-ENDOVASCULAR (Left) 1 Day Post-Op    Recommendations:     Discharge Recommendations: No Therapy Indicated  Discharge Equipment Recommendations:  none  Barriers to discharge:  None    Assessment:     Ye Partida is a 69 y.o. male with a medical diagnosis of S/P CABG (coronary artery bypass graft).  He presents alert and cooperative with good overall strength. Performance deficits affecting function: decreased upper extremity function, impaired cardiopulmonary response to activity, gait instability, impaired balance, impaired endurance, impaired self care skills, impaired functional mobility, decreased coordination, pain, decreased safety awareness.  Pt benefits from co-treatment with PT to accommodate pt's activity tolerance and progression with therapy.      Rehab Prognosis: Good; patient would benefit from acute skilled OT services to address these deficits and reach maximum level of function.       Plan:     Patient to be seen 5 x/week to address the above listed problems via self-care/home management, therapeutic activities, therapeutic exercises  Plan of Care Expires: 03/01/25  Plan of Care Reviewed with: patient, son    Subjective     Chief Complaint: reports premorbid back/neck pain  Patient/Family Comments/goals: to get better and go home    Occupational Profile:  Living Environment: lives with spouse in 2  with bed/bath downstairs and no MARIAMA; WIS  Previous level of function: (I) with ADL and ambulation; drives  Roles and Routines: caretaker to self and home  Equipment Used at Home: none  Assistance upon Discharge: family    Pain/Comfort:  Pain Rating 1: 2/10  Location - Side 1: Bilateral  Location - Orientation 1: generalized  Location 1: chest  Pain Addressed 1:  Reposition, Distraction  Pain Rating Post-Intervention 1: 2/10    Patients cultural, spiritual, Orthodox conflicts given the current situation: no    Objective:     Communicated with: RN prior to session.  Patient found supine with blood pressure cuff, pulse ox (continuous), telemetry, arterial line, central line, chest tube, external pacer, south catheter, peripheral IV upon OT entry to room.    General Precautions: Standard, fall, sternal  Orthopedic Precautions:    Braces:    Respiratory Status: Room air    Occupational Performance:    Bed Mobility:    Patient completed Scooting/Bridging with stand by assistance  Patient completed Supine to Sit with stand by assistance    Functional Mobility/Transfers:  Patient completed Sit <> Stand Transfer with stand by assistance  with  no assistive device   Functional Mobility: SBA to/from bathroom and around room    Activities of Daily Living:  Grooming: independence    Upper Body Dressing: contact guard assistance    Lower Body Dressing: minimum assistance    Toileting: contact guard assistance simulated    Cognitive/Visual Perceptual:  Oriented to: Person, Place, Time and Situation  Follows Commands/attention: Follows multistep  commands  Communication: clear/fluent  Memory:  No Deficits noted  Safety awareness/insight to disability: intact  Coping skills/emotional control: Appropriate to situation    Physical Exam:  Postural examination/scapula alignment:    -      FFP  Sensation:    -       Intact  Upper Extremity Range of Motion:     -       Right Upper Extremity: WFL  -       Left Upper Extremity: WFL  Upper Extremity Strength:    -       Right Upper Extremity: WFL  -       Left Upper Extremity: WFL   Strength:    -       Right Upper Extremity: WFL  -       Left Upper Extremity: WFL  Fine Motor Coordination:    -       Intact  Gross motor coordination:   WFL      AMPAC 6 Click ADL:  AMPAC Total Score: 18    Treatment & Education:  Pt ed on OT POC  Pt ed on  sternal precautions during ADL; handout provided  Pt stood at sink with SBA and mobilized around room  Pt ed on ROM ex's daily for increased overall strength and endurance to reduce risk of hospital acquired weakness  Pt ed on safety with ADL and fall risk  Reinforced use of call button to contact nursing staff for assistance with mobility      Patient left up in chair with all lines intact, call button in reach, RN notified, and son present    GOALS:   Multidisciplinary Problems       Occupational Therapy Goals          Problem: Occupational Therapy    Goal Priority Disciplines Outcome Interventions   Occupational Therapy Goal     OT, PT/OT Progressing    Description: Goals to be met by: 3/1/25     Patient will increase functional independence with ADLs by performing:    Feeding with [place].  UE Dressing with Set-up Assistance.  LE Dressing with Set-up Assistance.  Grooming while standing at sink with Set-up Assistance.  Toileting from toilet with Supervision for hygiene and clothing management.   Toilet transfer to toilet with Supervision.                         DME Justifications:  No DME recommended requiring DME justifications    History:     Past Medical History:   Diagnosis Date    Hyperlipidemia     Pyloric stenosis, congenital     s/p repair    Seizures          Past Surgical History:   Procedure Laterality Date    CORONARY ANGIOGRAPHY Right 2/19/2025    Procedure: ANGIOGRAM, CORONARY ARTERY;  Surgeon: Ari Latham III, MD;  Location: [facility] CATH LAB;  Service: Cardiology;  Laterality: Right;    pyloric stenosis      TONSILLECTOMY         Time Tracking:     OT Date of Treatment: 02/22/25  OT Start Time: 1047  OT Stop Time: 1110  OT Total Time (min): 23 min    Billable Minutes:Evaluation 10  Self Care/Home Management 10    2/22/2025

## 2025-02-22 NOTE — PLAN OF CARE
Problem: Physical Therapy  Goal: Physical Therapy Goal  Description: Goals to be met by: 3/3/25     Patient will increase functional independence with mobility by performin. Supine to sit with independent   2. Sit to stand transfer with Supervision  3. Gait  x 250 feet with Supervision .     Outcome: Progressing   Evaluation completed and goals appropriate 2025

## 2025-02-23 LAB
ALBUMIN SERPL BCP-MCNC: 3.3 G/DL (ref 3.5–5.2)
ALP SERPL-CCNC: 50 U/L (ref 40–150)
ALT SERPL W/O P-5'-P-CCNC: 16 U/L (ref 10–44)
ANION GAP SERPL CALC-SCNC: 7 MMOL/L (ref 8–16)
APTT PPP: 26.8 SEC (ref 21–32)
AST SERPL-CCNC: 37 U/L (ref 10–40)
BASOPHILS # BLD AUTO: 0.03 K/UL (ref 0–0.2)
BASOPHILS NFR BLD: 0.4 % (ref 0–1.9)
BILIRUB SERPL-MCNC: 0.7 MG/DL (ref 0.1–1)
BUN SERPL-MCNC: 16 MG/DL (ref 8–23)
CALCIUM SERPL-MCNC: 8.4 MG/DL (ref 8.7–10.5)
CHLORIDE SERPL-SCNC: 109 MMOL/L (ref 95–110)
CO2 SERPL-SCNC: 25 MMOL/L (ref 23–29)
CREAT SERPL-MCNC: 1.1 MG/DL (ref 0.5–1.4)
DIFFERENTIAL METHOD BLD: ABNORMAL
EOSINOPHIL # BLD AUTO: 0.1 K/UL (ref 0–0.5)
EOSINOPHIL NFR BLD: 1.4 % (ref 0–8)
ERYTHROCYTE [DISTWIDTH] IN BLOOD BY AUTOMATED COUNT: 16.1 % (ref 11.5–14.5)
EST. GFR  (NO RACE VARIABLE): >60 ML/MIN/1.73 M^2
GLUCOSE SERPL-MCNC: 132 MG/DL (ref 70–110)
HCT VFR BLD AUTO: 28.3 % (ref 40–54)
HGB BLD-MCNC: 9 G/DL (ref 14–18)
IMM GRANULOCYTES # BLD AUTO: 0.04 K/UL (ref 0–0.04)
IMM GRANULOCYTES NFR BLD AUTO: 0.5 % (ref 0–0.5)
INR PPP: 1.1 (ref 0.8–1.2)
LYMPHOCYTES # BLD AUTO: 0.9 K/UL (ref 1–4.8)
LYMPHOCYTES NFR BLD: 12.1 % (ref 18–48)
MAGNESIUM SERPL-MCNC: 2.2 MG/DL (ref 1.6–2.6)
MAGNESIUM SERPL-MCNC: 2.4 MG/DL (ref 1.6–2.6)
MCH RBC QN AUTO: 30.6 PG (ref 27–31)
MCHC RBC AUTO-ENTMCNC: 31.8 G/DL (ref 32–36)
MCV RBC AUTO: 96 FL (ref 82–98)
MONOCYTES # BLD AUTO: 0.8 K/UL (ref 0.3–1)
MONOCYTES NFR BLD: 10.5 % (ref 4–15)
NEUTROPHILS # BLD AUTO: 5.8 K/UL (ref 1.8–7.7)
NEUTROPHILS NFR BLD: 75.1 % (ref 38–73)
NRBC BLD-RTO: 0 /100 WBC
PHOSPHATE SERPL-MCNC: 2.5 MG/DL (ref 2.7–4.5)
PLATELET # BLD AUTO: 149 K/UL (ref 150–450)
PMV BLD AUTO: 10.7 FL (ref 9.2–12.9)
POCT GLUCOSE: 113 MG/DL (ref 70–110)
POCT GLUCOSE: 119 MG/DL (ref 70–110)
POCT GLUCOSE: 128 MG/DL (ref 70–110)
POTASSIUM SERPL-SCNC: 4.6 MMOL/L (ref 3.5–5.1)
POTASSIUM SERPL-SCNC: 4.7 MMOL/L (ref 3.5–5.1)
POTASSIUM SERPL-SCNC: 4.8 MMOL/L (ref 3.5–5.1)
PROT SERPL-MCNC: 5.3 G/DL (ref 6–8.4)
PROTHROMBIN TIME: 12 SEC (ref 9–12.5)
RBC # BLD AUTO: 2.94 M/UL (ref 4.6–6.2)
SODIUM SERPL-SCNC: 141 MMOL/L (ref 136–145)
WBC # BLD AUTO: 7.74 K/UL (ref 3.9–12.7)

## 2025-02-23 PROCEDURE — 25000003 PHARM REV CODE 250: Performed by: STUDENT IN AN ORGANIZED HEALTH CARE EDUCATION/TRAINING PROGRAM

## 2025-02-23 PROCEDURE — 85610 PROTHROMBIN TIME: CPT | Performed by: STUDENT IN AN ORGANIZED HEALTH CARE EDUCATION/TRAINING PROGRAM

## 2025-02-23 PROCEDURE — 83735 ASSAY OF MAGNESIUM: CPT

## 2025-02-23 PROCEDURE — 20600001 HC STEP DOWN PRIVATE ROOM

## 2025-02-23 PROCEDURE — 25000003 PHARM REV CODE 250

## 2025-02-23 PROCEDURE — 51798 US URINE CAPACITY MEASURE: CPT

## 2025-02-23 PROCEDURE — 85025 COMPLETE CBC W/AUTO DIFF WBC: CPT

## 2025-02-23 PROCEDURE — 84100 ASSAY OF PHOSPHORUS: CPT

## 2025-02-23 PROCEDURE — 85730 THROMBOPLASTIN TIME PARTIAL: CPT | Performed by: STUDENT IN AN ORGANIZED HEALTH CARE EDUCATION/TRAINING PROGRAM

## 2025-02-23 PROCEDURE — 84132 ASSAY OF SERUM POTASSIUM: CPT

## 2025-02-23 PROCEDURE — 63600175 PHARM REV CODE 636 W HCPCS: Performed by: STUDENT IN AN ORGANIZED HEALTH CARE EDUCATION/TRAINING PROGRAM

## 2025-02-23 PROCEDURE — 80053 COMPREHEN METABOLIC PANEL: CPT

## 2025-02-23 RX ORDER — FAMOTIDINE 20 MG/1
20 TABLET, FILM COATED ORAL 2 TIMES DAILY
Status: DISCONTINUED | OUTPATIENT
Start: 2025-02-23 | End: 2025-02-25 | Stop reason: HOSPADM

## 2025-02-23 RX ORDER — SODIUM,POTASSIUM PHOSPHATES 280-250MG
2 POWDER IN PACKET (EA) ORAL ONCE
Status: COMPLETED | OUTPATIENT
Start: 2025-02-23 | End: 2025-02-23

## 2025-02-23 RX ORDER — FUROSEMIDE 10 MG/ML
20 INJECTION INTRAMUSCULAR; INTRAVENOUS 2 TIMES DAILY
Status: DISCONTINUED | OUTPATIENT
Start: 2025-02-23 | End: 2025-02-25 | Stop reason: HOSPADM

## 2025-02-23 RX ORDER — POTASSIUM CHLORIDE 20 MEQ/1
20 TABLET, EXTENDED RELEASE ORAL DAILY
Status: DISCONTINUED | OUTPATIENT
Start: 2025-02-24 | End: 2025-02-24

## 2025-02-23 RX ORDER — BISACODYL 10 MG/1
10 SUPPOSITORY RECTAL ONCE
Status: DISCONTINUED | OUTPATIENT
Start: 2025-02-23 | End: 2025-02-24

## 2025-02-23 RX ADMIN — ASPIRIN 81 MG CHEWABLE TABLET 81 MG: 81 TABLET CHEWABLE at 09:02

## 2025-02-23 RX ADMIN — CLOPIDOGREL BISULFATE 75 MG: 75 TABLET ORAL at 09:02

## 2025-02-23 RX ADMIN — FAMOTIDINE 20 MG: 20 TABLET, FILM COATED ORAL at 09:02

## 2025-02-23 RX ADMIN — METOPROLOL TARTRATE 12.5 MG: 25 TABLET, FILM COATED ORAL at 09:02

## 2025-02-23 RX ADMIN — LAMOTRIGINE 200 MG: 100 TABLET ORAL at 09:02

## 2025-02-23 RX ADMIN — LORAZEPAM 0.5 MG: 0.5 TABLET ORAL at 09:02

## 2025-02-23 RX ADMIN — TAMSULOSIN HYDROCHLORIDE 0.4 MG: 0.4 CAPSULE ORAL at 09:02

## 2025-02-23 RX ADMIN — ACETAMINOPHEN 1000 MG: 500 TABLET ORAL at 05:02

## 2025-02-23 RX ADMIN — POTASSIUM & SODIUM PHOSPHATES POWDER PACK 280-160-250 MG 2 PACKET: 280-160-250 PACK at 09:02

## 2025-02-23 RX ADMIN — FUROSEMIDE 20 MG: 10 INJECTION, SOLUTION INTRAMUSCULAR; INTRAVENOUS at 09:02

## 2025-02-23 RX ADMIN — POLYETHYLENE GLYCOL 3350 17 G: 17 POWDER, FOR SOLUTION ORAL at 04:02

## 2025-02-23 RX ADMIN — MUPIROCIN: 20 OINTMENT TOPICAL at 09:02

## 2025-02-23 RX ADMIN — ATORVASTATIN CALCIUM 40 MG: 40 TABLET, FILM COATED ORAL at 09:02

## 2025-02-23 RX ADMIN — ACETAMINOPHEN 1000 MG: 500 TABLET ORAL at 12:02

## 2025-02-23 RX ADMIN — FUROSEMIDE 20 MG: 10 INJECTION, SOLUTION INTRAMUSCULAR; INTRAVENOUS at 05:02

## 2025-02-23 RX ADMIN — DOCUSATE SODIUM 100 MG: 100 CAPSULE, LIQUID FILLED ORAL at 09:02

## 2025-02-23 RX ADMIN — OXYCODONE HYDROCHLORIDE 10 MG: 10 TABLET ORAL at 04:02

## 2025-02-23 NOTE — NURSING
DO Karyna notified of patient having blood tinged urine via south. Patient has no complaints of pain/burning. No new orders given. Notified of delirium and confusion that occurred on night shift.      10:32 Karyna notified of patient getting out of bed, almost ripping pacer wired from site. Patient stated he was going to the restroom. Nurse explained to him there's no need to get out the bed d/t south still being in place. Patient redirected and put back into bed and educated on not getting out the bed without assistance. Instructed to call nurse with needs. All lines still intact, chest tube dressing clean dry and intact. Tele sitter orders placed and the room.

## 2025-02-23 NOTE — PLAN OF CARE
Problem: Adult Inpatient Plan of Care  Goal: Plan of Care Review  Outcome: Progressing  Goal: Patient-Specific Goal (Individualized)  Outcome: Progressing  Goal: Absence of Hospital-Acquired Illness or Injury  Outcome: Progressing  Goal: Optimal Comfort and Wellbeing  Outcome: Progressing  Goal: Readiness for Transition of Care  Outcome: Progressing     Problem: Wound  Goal: Optimal Coping  Outcome: Progressing  Goal: Optimal Functional Ability  Outcome: Progressing  Goal: Absence of Infection Signs and Symptoms  Outcome: Progressing  Goal: Improved Oral Intake  Outcome: Progressing  Goal: Optimal Pain Control and Function  Outcome: Progressing  Goal: Skin Health and Integrity  Outcome: Progressing  Goal: Optimal Wound Healing  Outcome: Progressing     Problem: Fall Injury Risk  Goal: Absence of Fall and Fall-Related Injury  Outcome: Progressing     Problem: Infection  Goal: Absence of Infection Signs and Symptoms  Outcome: Progressing     Problem: Restraint, Nonviolent  Goal: Absence of Harm or Injury  Outcome: Progressing     Problem: Skin Injury Risk Increased  Goal: Skin Health and Integrity  Outcome: Progressing

## 2025-02-23 NOTE — CARE UPDATE
Care Update:     No acute events overnight. Patient in room 328/328 A. Blood glucose stable on current inpatient regimen.No hypoglycemia noted over the past 24-hours. Endocrine will continue to follow and manage insulin orders inpatient.  2 Days Post-Op    Steroid use- None.   Renal function-   Lab Results   Component Value Date    CREATININE 1.1 02/23/2025        Diet Cardiac Standard Tray     POCT Glucose   Date Value Ref Range Status   02/22/2025 135 (H) 70 - 110 mg/dL Final   02/22/2025 126 (H) 70 - 110 mg/dL Final   02/22/2025 110 70 - 110 mg/dL Final   02/22/2025 100 70 - 110 mg/dL Final   02/22/2025 85 70 - 110 mg/dL Final   02/22/2025 70 70 - 110 mg/dL Final   02/22/2025 116 (H) 70 - 110 mg/dL Final   02/22/2025 115 (H) 70 - 110 mg/dL Final   02/22/2025 100 70 - 110 mg/dL Final   02/22/2025 105 70 - 110 mg/dL Final   02/22/2025 102 70 - 110 mg/dL Final   02/22/2025 103 70 - 110 mg/dL Final   02/22/2025 115 (H) 70 - 110 mg/dL Final   02/21/2025 137 (H) 70 - 110 mg/dL Final   02/21/2025 120 (H) 70 - 110 mg/dL Final   02/21/2025 137 (H) 70 - 110 mg/dL Final   02/21/2025 158 (H) 70 - 110 mg/dL Final   02/21/2025 147 (H) 70 - 110 mg/dL Final   02/21/2025 137 (H) 70 - 110 mg/dL Final   02/21/2025 122 (H) 70 - 110 mg/dL Final   02/21/2025 120 (H) 70 - 110 mg/dL Final   02/21/2025 119 (H) 70 - 110 mg/dL Final   02/21/2025 108 70 - 110 mg/dL Final   02/20/2025 129 (H) 70 - 110 mg/dL Final   02/20/2025 89 70 - 110 mg/dL Final   02/20/2025 132 (H) 70 - 110 mg/dL Final     Lab Results   Component Value Date    HGBA1C 5.2 01/29/2025       Endocrine  Transient hyperglycemia post procedure  Endocrinology consulted for BG management.   BG goal 110-140 (CTS Protocol)      - Novolog (Insulin Aspart) prn for BG excursions [facility] SSI (150/25)  - BG checks AC/HS  - Hypoglycemia protocol in place        ** Please notify Endocrine for any change and/or advance in diet**  ** Please call Endocrine for any BG related issues **      Discharge Planning:   TBD. Please notify endocrinology prior to discharge.    King Lundy DNP, FNP-C  Department of Endocrinology  Inpatient Glycemic Management

## 2025-02-23 NOTE — PLAN OF CARE
Problem: Adult Inpatient Plan of Care  Goal: Absence of Hospital-Acquired Illness or Injury  Outcome: Progressing  Goal: Optimal Comfort and Wellbeing  Outcome: Progressing  Goal: Readiness for Transition of Care  Outcome: Progressing     Problem: Wound  Goal: Optimal Functional Ability  Outcome: Progressing  Goal: Absence of Infection Signs and Symptoms  Outcome: Progressing  Goal: Skin Health and Integrity  Outcome: Progressing     Problem: Skin Injury Risk Increased  Goal: Skin Health and Integrity  Outcome: Progressing

## 2025-02-23 NOTE — SUBJECTIVE & OBJECTIVE
Interval History: Uneventful step down. NAEO. VSS on RA. Snell removal this morning.       Medications:  Continuous Infusions:  Scheduled Meds:   acetaminophen  1,000 mg Oral Q6H    aspirin  81 mg Oral Daily    atorvastatin  40 mg Oral Daily    bisacodyL  10 mg Rectal Once    clopidogreL  75 mg Oral Daily    docusate sodium  100 mg Oral BID    famotidine  20 mg Oral BID    furosemide (LASIX) injection  20 mg Intravenous BID    lamoTRIgine  200 mg Oral Daily    LORazepam  0.5 mg Oral BID    metoprolol tartrate  12.5 mg Oral BID    mupirocin   Nasal BID    polyethylene glycol  17 g Oral Daily    [START ON 2/24/2025] potassium chloride  20 mEq Oral Daily    potassium, sodium phosphates  2 packet Oral Once    tamsulosin  0.4 mg Oral Daily     PRN Meds:  Current Facility-Administered Medications:     0.9%  NaCl infusion (for blood administration), , Intravenous, Q24H PRN    dextrose 50%, 12.5 g, Intravenous, PRN    dextrose 50%, 25 g, Intravenous, PRN    glucagon (human recombinant), 1 mg, Intramuscular, PRN    glucose, 16 g, Oral, PRN    glucose, 24 g, Oral, PRN    insulin aspart U-100, 0-10 Units, Subcutaneous, QID (AC + HS) PRN    melatonin, 6 mg, Oral, Nightly PRN    metoclopramide, 5 mg, Intravenous, Q6H PRN    naloxone, 0.02 mg, Intravenous, PRN    ondansetron, 4 mg, Intravenous, Q6H PRN    oxyCODONE, 5 mg, Oral, Q4H PRN    oxyCODONE, 10 mg, Oral, Q4H PRN    sodium chloride 0.9%, 10 mL, Intravenous, PRN    sodium chloride 0.9%, 10 mL, Intravenous, Q12H PRN     Objective:     Vital Signs (Most Recent):  Temp: 98.1 °F (36.7 °C) (02/23/25 0702)  Pulse: 70 (02/23/25 0702)  Resp: 16 (02/23/25 0702)  BP: 114/64 (02/23/25 0702)  SpO2: (!) 91 % (02/23/25 0702) Vital Signs (24h Range):  Temp:  [97.9 °F (36.6 °C)-99.4 °F (37.4 °C)] 98.1 °F (36.7 °C)  Pulse:  [61-90] 70  Resp:  [14-27] 16  SpO2:  [89 %-100 %] 91 %  BP: ()/(53-72) 114/64  Arterial Line BP: ()/(50-66) 127/63     Weight: 66.6 kg (146 lb 13.2  oz)  Body mass index is 22.32 kg/m².    SpO2: (!) 91 %       Intake/Output - Last 3 Shifts         02/21 0700  02/22 0659 02/22 0700  02/23 0659 02/23 0700  02/24 0659    P.O.  1680 240    I.V. (mL/kg) 1737.6 (26.1) 2.4 (0)     Blood 771      IV Piggyback 4007.2      Total Intake(mL/kg) 6515.8 (97.8) 1682.4 (25.3) 240 (3.6)    Urine (mL/kg/hr) 2260 (1.4) 1053 (0.7)     Chest Tube 490 321     Total Output 2750 1374     Net +3765.8 +308.4 +240                   Lines/Drains/Airways       Drain  Duration                  Chest Tube 02/21/25 1401 Tube - 1 Left Mediastinal 1 day         Chest Tube 02/21/25 1401 Tube - 2 Right Mediastinal 1 day         Chest Tube 02/21/25 1401 Tube - 3 Left Pleural 1 day         Urethral Catheter 02/21/25 0949 Silicone;Temperature probe;Straight-tip;Non-latex 14 Fr. 1 day              Line  Duration                  Pacer Wires 02/21/25 1401 1 day              Peripheral Intravenous Line  Duration                  Peripheral IV - Single Lumen 02/19/25 1542 20 G Left;Posterior Forearm 3 days         Peripheral IV - Single Lumen 02/21/25 0948 16 G Right Antecubital 1 day                     Physical Exam  Constitutional:       General: He is not in acute distress.  Cardiovascular:      Rate and Rhythm: Normal rate and regular rhythm.      Comments:  SS, pacing wires in place  Pulmonary:      Effort: Pulmonary effort is normal. No respiratory distress.   Abdominal:      General: There is no distension.      Palpations: Abdomen is soft.      Tenderness: There is no abdominal tenderness.   Musculoskeletal:         General: Swelling present.   Skin:     General: Skin is warm and dry.      Capillary Refill: Capillary refill takes less than 2 seconds.   Neurological:      General: No focal deficit present.      Mental Status: He is alert.            Significant Labs:  All pertinent labs from the last 24 hours have been reviewed.    Significant Diagnostics:  I have reviewed all pertinent  imaging results/findings within the past 24 hours.

## 2025-02-23 NOTE — PROGRESS NOTES
02/22/25 2315 02/23/25 0030   Vital Signs   Temp 98.6 °F (37 °C)  --    Temp Source Oral  --    Pulse 71  --    Heart Rate Source Monitor  --    Resp 18  --    SpO2 98 %  --    BP (!) 86/53 104/65   MAP (mmHg) 64 79   BP Location Right arm Right arm   Patient Position Lying Sitting       2315: Assessed pt. Asymptomatic.  0030: Pt reassessed. BP back within patient's baseline.

## 2025-02-23 NOTE — PROGRESS NOTES
[facility] - [facility]iology Stepdown  Cardiothoracic Surgery  Progress Note    Patient Name: Ye Partida  MRN: [id number]  Admission Date: 2/19/2025  Hospital Length of Stay: 3 days  Code Status: Full Code   Attending Physician: Flavio Leal MD   Referring Provider: Self, Aaareferral  Principal Problem:S/P CABG (coronary artery bypass graft)      Subjective:     Post-Op Info:  Procedure(s) (LRB):  CORONARY ARTERY BYPASS GRAFT (CABG) (N/A)  HARVEST-VEIN-ENDOVASCULAR (Left)   2 Days Post-Op     Interval History: Uneventful step down. NAEO. VSS on RA. Snell removal this morning.       Medications:  Continuous Infusions:  Scheduled Meds:   acetaminophen  1,000 mg Oral Q6H    aspirin  81 mg Oral Daily    atorvastatin  40 mg Oral Daily    bisacodyL  10 mg Rectal Once    clopidogreL  75 mg Oral Daily    docusate sodium  100 mg Oral BID    famotidine  20 mg Oral BID    furosemide (LASIX) injection  20 mg Intravenous BID    lamoTRIgine  200 mg Oral Daily    LORazepam  0.5 mg Oral BID    metoprolol tartrate  12.5 mg Oral BID    mupirocin   Nasal BID    polyethylene glycol  17 g Oral Daily    [START ON 2/24/2025] potassium chloride  20 mEq Oral Daily    potassium, sodium phosphates  2 packet Oral Once    tamsulosin  0.4 mg Oral Daily     PRN Meds:  Current Facility-Administered Medications:     0.9%  NaCl infusion (for blood administration), , Intravenous, Q24H PRN    dextrose 50%, 12.5 g, Intravenous, PRN    dextrose 50%, 25 g, Intravenous, PRN    glucagon (human recombinant), 1 mg, Intramuscular, PRN    glucose, 16 g, Oral, PRN    glucose, 24 g, Oral, PRN    insulin aspart U-100, 0-10 Units, Subcutaneous, QID (AC + HS) PRN    melatonin, 6 mg, Oral, Nightly PRN    metoclopramide, 5 mg, Intravenous, Q6H PRN    naloxone, 0.02 mg, Intravenous, PRN    ondansetron, 4 mg, Intravenous, Q6H PRN    oxyCODONE, 5 mg, Oral, Q4H PRN    oxyCODONE, 10 mg, Oral, Q4H PRN    sodium chloride 0.9%, 10 mL, Intravenous, PRN    sodium chloride  0.9%, 10 mL, Intravenous, Q12H PRN     Objective:     Vital Signs (Most Recent):  Temp: 98.1 °F (36.7 °C) (02/23/25 0702)  Pulse: 70 (02/23/25 0702)  Resp: 16 (02/23/25 0702)  BP: 114/64 (02/23/25 0702)  SpO2: (!) 91 % (02/23/25 0702) Vital Signs (24h Range):  Temp:  [97.9 °F (36.6 °C)-99.4 °F (37.4 °C)] 98.1 °F (36.7 °C)  Pulse:  [61-90] 70  Resp:  [14-27] 16  SpO2:  [89 %-100 %] 91 %  BP: ()/(53-72) 114/64  Arterial Line BP: ()/(50-66) 127/63     Weight: 66.6 kg (146 lb 13.2 oz)  Body mass index is 22.32 kg/m².    SpO2: (!) 91 %       Intake/Output - Last 3 Shifts         02/21 0700 02/22 0659 02/22 0700 02/23 0659 02/23 0700  02/24 0659    P.O.  1680 240    I.V. (mL/kg) 1737.6 (26.1) 2.4 (0)     Blood 771      IV Piggyback 4007.2      Total Intake(mL/kg) 6515.8 (97.8) 1682.4 (25.3) 240 (3.6)    Urine (mL/kg/hr) 2260 (1.4) 1053 (0.7)     Chest Tube 490 321     Total Output 2750 1374     Net +3765.8 +308.4 +240                   Lines/Drains/Airways       Drain  Duration                  Chest Tube 02/21/25 1401 Tube - 1 Left Mediastinal 1 day         Chest Tube 02/21/25 1401 Tube - 2 Right Mediastinal 1 day         Chest Tube 02/21/25 1401 Tube - 3 Left Pleural 1 day         Urethral Catheter 02/21/25 0949 Silicone;Temperature probe;Straight-tip;Non-latex 14 Fr. 1 day              Line  Duration                  Pacer Wires 02/21/25 1401 1 day              Peripheral Intravenous Line  Duration                  Peripheral IV - Single Lumen 02/19/25 1542 20 G Left;Posterior Forearm 3 days         Peripheral IV - Single Lumen 02/21/25 0948 16 G Right Antecubital 1 day                     Physical Exam  Constitutional:       General: He is not in acute distress.  Cardiovascular:      Rate and Rhythm: Normal rate and regular rhythm.      Comments:  SS, pacing wires in place  Pulmonary:      Effort: Pulmonary effort is normal. No respiratory distress.   Abdominal:      General: There is no distension.       Palpations: Abdomen is soft.      Tenderness: There is no abdominal tenderness.   Musculoskeletal:         General: Swelling present.   Skin:     General: Skin is warm and dry.      Capillary Refill: Capillary refill takes less than 2 seconds.   Neurological:      General: No focal deficit present.      Mental Status: He is alert.            Significant Labs:  All pertinent labs from the last 24 hours have been reviewed.    Significant Diagnostics:  I have reviewed all pertinent imaging results/findings within the past 24 hours.  Assessment/Plan:     * S/P CABG (coronary artery bypass graft)  S/p CABG x2    - ASA, statin, BB  - Plavix for incomplete revascularization to circumflex system  - Maintain sternal precautions   - Lasix diuresis  - Multimodal pain management   - Encourage ambulation  - PT/OT  - Cardiac diet with 1500 cc fluid restriction   - Bowel regimen in place, suppository today  - Pepcid for ulcer prevention   - Pacer wires intact, plan to remove closer to discharge   - Follow chest tube output, removal when appropriate  - Monitor electrolytes to keep Mag above 2 and Potassium above 4  - OOBTC  - Encourage IS use  - Flomax, south removal today      Transient hyperglycemia post procedure  Endocrine following    CAD (coronary artery disease)  Mr. Ye Partida is a 69 year old male with a PMHx of CAD, HTN, seizure disorder, and peripheral neuropathy [place] [facility] pain for 3 days. Stress test done for a preop evaluation to be a kidney donor was positive. Stress echo revealed EF 50-55%, EKG revealed ST seg depression in inferolateral leads (II, III, aVF, V5 and V6), post stress EF reduced to 43%.    During his admission, he underwent a cardiac evaluation which demonstrated multivessel coronary artery disease with Prox RCA lesion was 99% stenosed, Mid Cx lesion was 90% stenosed and Prox LAD to Mid LAD lesion was 80% stenosed.      Cath films reviewed and target present.  Plans  for OR in th AM, patient and family aware and agree with plan.  Please contact CTS prior to adding on cardiac medications.   Will place pre-op orders today.  Patient will be NPO at midnight in anticipation of surgical revascularization in the morning.     Mixed hyperlipidemia  statin    Generalized convulsive epilepsy with intractable epilepsy  Lamotrigine and lorazepam        Randy Troncoso, DO  Cardiothoracic Surgery  [facility] - [facility] Step[facility]

## 2025-02-23 NOTE — ANESTHESIA POSTPROCEDURE EVALUATION
Anesthesia Post Evaluation    Patient: Ye Partida    Procedure(s) Performed: Procedure(s) (LRB):  CORONARY ARTERY BYPASS GRAFT (CABG) (N/A)  HARVEST-VEIN-ENDOVASCULAR (Left)    Final Anesthesia Type: general      Patient location during evaluation: floor  Patient participation: Yes- Able to Participate  Level of consciousness: awake  Post-procedure vital signs: reviewed and stable  Pain management: adequate  Airway patency: patent    PONV status at discharge: No PONV  Anesthetic complications: no      Cardiovascular status: hemodynamically stable  Respiratory status: unassisted  Follow-up not needed.              Vitals Value Taken Time   /64 02/23/25 07:02   Temp 36.7 °C (98.1 °F) 02/23/25 07:02   Pulse 66 02/23/25 08:30   Resp 16 02/23/25 07:02   SpO2 91 % 02/23/25 07:02         No case tracking events are documented in the log.      Pain/Pawan Score: Pain Rating Prior to Med Admin: 10 (2/23/2025  4:03 AM)  Pain Rating Post Med Admin: 0 (2/23/2025  5:03 AM)

## 2025-02-23 NOTE — ASSESSMENT & PLAN NOTE
S/p CABG x2    - ASA, statin, BB  - Plavix for incomplete revascularization to circumflex system  - Maintain sternal precautions   - Lasix diuresis  - Multimodal pain management   - Encourage ambulation  - PT/OT  - Cardiac diet with 1500 cc fluid restriction   - Bowel regimen in place, suppository today  - Pepcid for ulcer prevention   - Pacer wires intact, plan to remove closer to discharge   - Follow chest tube output, removal when appropriate  - Monitor electrolytes to keep Mag above 2 and Potassium above 4  - OOBTC  - Encourage IS use  - Flomax, south removal today

## 2025-02-24 PROBLEM — D62 ACUTE BLOOD LOSS ANEMIA: Status: ACTIVE | Noted: 2025-02-24

## 2025-02-24 PROBLEM — E83.39 HYPOPHOSPHATEMIA: Status: ACTIVE | Noted: 2025-02-24

## 2025-02-24 LAB
ALBUMIN SERPL BCP-MCNC: 2.7 G/DL (ref 3.5–5.2)
ALP SERPL-CCNC: 54 U/L (ref 40–150)
ALT SERPL W/O P-5'-P-CCNC: 15 U/L (ref 10–44)
ANION GAP SERPL CALC-SCNC: 5 MMOL/L (ref 8–16)
AST SERPL-CCNC: 31 U/L (ref 10–40)
BASOPHILS # BLD AUTO: 0.02 K/UL (ref 0–0.2)
BASOPHILS NFR BLD: 0.3 % (ref 0–1.9)
BILIRUB SERPL-MCNC: 0.5 MG/DL (ref 0.1–1)
BLD PROD TYP BPU: NORMAL
BLOOD UNIT EXPIRATION DATE: NORMAL
BLOOD UNIT TYPE CODE: 6200
BLOOD UNIT TYPE: NORMAL
BUN SERPL-MCNC: 14 MG/DL (ref 8–23)
CALCIUM SERPL-MCNC: 7.9 MG/DL (ref 8.7–10.5)
CHLORIDE SERPL-SCNC: 104 MMOL/L (ref 95–110)
CO2 SERPL-SCNC: 28 MMOL/L (ref 23–29)
CODING SYSTEM: NORMAL
CREAT SERPL-MCNC: 0.8 MG/DL (ref 0.5–1.4)
CROSSMATCH INTERPRETATION: NORMAL
DIFFERENTIAL METHOD BLD: ABNORMAL
DISPENSE STATUS: NORMAL
EOSINOPHIL # BLD AUTO: 0.3 K/UL (ref 0–0.5)
EOSINOPHIL NFR BLD: 4.7 % (ref 0–8)
ERYTHROCYTE [DISTWIDTH] IN BLOOD BY AUTOMATED COUNT: 15.8 % (ref 11.5–14.5)
EST. GFR  (NO RACE VARIABLE): >60 ML/MIN/1.73 M^2
GLUCOSE SERPL-MCNC: 96 MG/DL (ref 70–110)
HCT VFR BLD AUTO: 25.6 % (ref 40–54)
HGB BLD-MCNC: 8.3 G/DL (ref 14–18)
IMM GRANULOCYTES # BLD AUTO: 0.03 K/UL (ref 0–0.04)
IMM GRANULOCYTES NFR BLD AUTO: 0.4 % (ref 0–0.5)
LYMPHOCYTES # BLD AUTO: 1 K/UL (ref 1–4.8)
LYMPHOCYTES NFR BLD: 14.5 % (ref 18–48)
MAGNESIUM SERPL-MCNC: 1.9 MG/DL (ref 1.6–2.6)
MCH RBC QN AUTO: 30.5 PG (ref 27–31)
MCHC RBC AUTO-ENTMCNC: 32.4 G/DL (ref 32–36)
MCV RBC AUTO: 94 FL (ref 82–98)
MONOCYTES # BLD AUTO: 0.8 K/UL (ref 0.3–1)
MONOCYTES NFR BLD: 11.9 % (ref 4–15)
NEUTROPHILS # BLD AUTO: 4.8 K/UL (ref 1.8–7.7)
NEUTROPHILS NFR BLD: 68.2 % (ref 38–73)
NRBC BLD-RTO: 0 /100 WBC
NUM UNITS TRANS PACKED RBC: NORMAL
NUM UNITS TRANS PACKED RBC: NORMAL
PHOSPHATE SERPL-MCNC: 1.7 MG/DL (ref 2.7–4.5)
PLATELET # BLD AUTO: 141 K/UL (ref 150–450)
PMV BLD AUTO: 11.1 FL (ref 9.2–12.9)
POCT GLUCOSE: 108 MG/DL (ref 70–110)
POTASSIUM SERPL-SCNC: 3.6 MMOL/L (ref 3.5–5.1)
PROT SERPL-MCNC: 4.9 G/DL (ref 6–8.4)
RBC # BLD AUTO: 2.72 M/UL (ref 4.6–6.2)
SODIUM SERPL-SCNC: 137 MMOL/L (ref 136–145)
TRANS ERYTHROCYTES VOL PATIENT: NORMAL ML
TRANS ERYTHROCYTES VOL PATIENT: NORMAL ML
WBC # BLD AUTO: 7.08 K/UL (ref 3.9–12.7)

## 2025-02-24 PROCEDURE — 63600175 PHARM REV CODE 636 W HCPCS: Performed by: STUDENT IN AN ORGANIZED HEALTH CARE EDUCATION/TRAINING PROGRAM

## 2025-02-24 PROCEDURE — 36415 COLL VENOUS BLD VENIPUNCTURE: CPT | Performed by: STUDENT IN AN ORGANIZED HEALTH CARE EDUCATION/TRAINING PROGRAM

## 2025-02-24 PROCEDURE — 97530 THERAPEUTIC ACTIVITIES: CPT

## 2025-02-24 PROCEDURE — 25000003 PHARM REV CODE 250

## 2025-02-24 PROCEDURE — 25000003 PHARM REV CODE 250: Performed by: STUDENT IN AN ORGANIZED HEALTH CARE EDUCATION/TRAINING PROGRAM

## 2025-02-24 PROCEDURE — 20600001 HC STEP DOWN PRIVATE ROOM

## 2025-02-24 PROCEDURE — 83735 ASSAY OF MAGNESIUM: CPT | Performed by: STUDENT IN AN ORGANIZED HEALTH CARE EDUCATION/TRAINING PROGRAM

## 2025-02-24 PROCEDURE — 97530 THERAPEUTIC ACTIVITIES: CPT | Mod: CQ

## 2025-02-24 PROCEDURE — 97116 GAIT TRAINING THERAPY: CPT | Mod: CQ

## 2025-02-24 PROCEDURE — 80053 COMPREHEN METABOLIC PANEL: CPT

## 2025-02-24 PROCEDURE — 25000003 PHARM REV CODE 250: Performed by: PHYSICIAN ASSISTANT

## 2025-02-24 PROCEDURE — 84100 ASSAY OF PHOSPHORUS: CPT

## 2025-02-24 PROCEDURE — 85025 COMPLETE CBC W/AUTO DIFF WBC: CPT

## 2025-02-24 RX ORDER — AMOXICILLIN 250 MG
2 CAPSULE ORAL 2 TIMES DAILY
Status: DISCONTINUED | OUTPATIENT
Start: 2025-02-24 | End: 2025-02-25 | Stop reason: HOSPADM

## 2025-02-24 RX ORDER — POTASSIUM CHLORIDE 20 MEQ/1
20 TABLET, EXTENDED RELEASE ORAL 2 TIMES DAILY
Status: DISCONTINUED | OUTPATIENT
Start: 2025-02-24 | End: 2025-02-25

## 2025-02-24 RX ADMIN — METOPROLOL TARTRATE 12.5 MG: 25 TABLET, FILM COATED ORAL at 08:02

## 2025-02-24 RX ADMIN — FUROSEMIDE 20 MG: 10 INJECTION, SOLUTION INTRAMUSCULAR; INTRAVENOUS at 08:02

## 2025-02-24 RX ADMIN — ATORVASTATIN CALCIUM 40 MG: 40 TABLET, FILM COATED ORAL at 08:02

## 2025-02-24 RX ADMIN — POLYETHYLENE GLYCOL 3350 17 G: 17 POWDER, FOR SOLUTION ORAL at 08:02

## 2025-02-24 RX ADMIN — ACETAMINOPHEN 1000 MG: 500 TABLET ORAL at 06:02

## 2025-02-24 RX ADMIN — DIBASIC SODIUM PHOSPHATE, MONOBASIC POTASSIUM PHOSPHATE AND MONOBASIC SODIUM PHOSPHATE 2 TABLET: 852; 155; 130 TABLET ORAL at 10:02

## 2025-02-24 RX ADMIN — CLOPIDOGREL BISULFATE 75 MG: 75 TABLET ORAL at 08:02

## 2025-02-24 RX ADMIN — TAMSULOSIN HYDROCHLORIDE 0.4 MG: 0.4 CAPSULE ORAL at 08:02

## 2025-02-24 RX ADMIN — SENNOSIDES AND DOCUSATE SODIUM 2 TABLET: 50; 8.6 TABLET ORAL at 08:02

## 2025-02-24 RX ADMIN — ASPIRIN 81 MG CHEWABLE TABLET 81 MG: 81 TABLET CHEWABLE at 08:02

## 2025-02-24 RX ADMIN — LORAZEPAM 0.5 MG: 0.5 TABLET ORAL at 08:02

## 2025-02-24 RX ADMIN — LAMOTRIGINE 200 MG: 100 TABLET ORAL at 08:02

## 2025-02-24 RX ADMIN — POTASSIUM CHLORIDE 20 MEQ: 1500 TABLET, EXTENDED RELEASE ORAL at 08:02

## 2025-02-24 RX ADMIN — DIBASIC SODIUM PHOSPHATE, MONOBASIC POTASSIUM PHOSPHATE AND MONOBASIC SODIUM PHOSPHATE 2 TABLET: 852; 155; 130 TABLET ORAL at 08:02

## 2025-02-24 RX ADMIN — ACETAMINOPHEN 1000 MG: 500 TABLET ORAL at 12:02

## 2025-02-24 RX ADMIN — ACETAMINOPHEN 1000 MG: 500 TABLET ORAL at 05:02

## 2025-02-24 RX ADMIN — FUROSEMIDE 20 MG: 10 INJECTION, SOLUTION INTRAMUSCULAR; INTRAVENOUS at 05:02

## 2025-02-24 RX ADMIN — FAMOTIDINE 20 MG: 20 TABLET, FILM COATED ORAL at 08:02

## 2025-02-24 RX ADMIN — POTASSIUM CHLORIDE 20 MEQ: 1500 TABLET, EXTENDED RELEASE ORAL at 10:02

## 2025-02-24 NOTE — PLAN OF CARE
Problem: Occupational Therapy  Goal: Occupational Therapy Goal  Description: Goals to be met by: 03/01/2025     Patient will increase functional independence with ADLs by performing:    Feeding with [place].  UE Dressing with Set-up Assistance.  LE Dressing with Set-up Assistance.  Grooming while standing at sink with Set-up Assistance.  Toileting from toilet with Supervision for hygiene and clothing management.   Toilet transfer to toilet with Supervision.    Outcome: Progressing

## 2025-02-24 NOTE — PROGRESS NOTES
[facility] - [facility]iology Stepdown  Cardiothoracic Surgery  Progress Note    Patient Name: Ye Partida  MRN: [id number]  Admission Date: 2/19/2025  Hospital Length of Stay: 4 days  Code Status: Full Code   Attending Physician: Flavio Leal MD   Referring Provider: Self, Aaareferral  Principal Problem:S/P CABG (coronary artery bypass graft)            Subjective:     Post-Op Info:  Procedure(s) (LRB):  CORONARY ARTERY BYPASS GRAFT (CABG) (N/A)  HARVEST-VEIN-ENDOVASCULAR (Left)   3 Days Post-Op     Interval History: NAEON. Will likely remove some chest tubes today. OR dressing removed. Incision cleansed with betadine and covered with an island dressing. Bowel regimen changed. Patient reports having a stomach surgery in the past and only goes maybe 3 times per week. Will add Boost. Wean oxygen as tolerated. Electrolytes replaced.      Review of Systems   Constitutional: Negative for malaise/fatigue.   Cardiovascular:  Positive for dyspnea on exertion. Negative for chest pain and claudication.   Respiratory:  Negative for shortness of breath.    Hematologic/Lymphatic: Negative for bleeding problem.   Gastrointestinal:  Negative for abdominal pain.   Genitourinary:  Negative for dysuria.   Neurological:  Positive for weakness.     Medications:  Continuous Infusions:  Scheduled Meds:   acetaminophen  1,000 mg Oral Q6H    aspirin  81 mg Oral Daily    atorvastatin  40 mg Oral Daily    clopidogreL  75 mg Oral Daily    famotidine  20 mg Oral BID    furosemide (LASIX) injection  20 mg Intravenous BID    k phos di & mono-sod phos mono  2 tablet Oral BID    lamoTRIgine  200 mg Oral Daily    LORazepam  0.5 mg Oral BID    metoprolol tartrate  12.5 mg Oral BID    polyethylene glycol  17 g Oral Daily    potassium chloride  20 mEq Oral BID    senna-docusate 8.6-50 mg  2 tablet Oral BID    tamsulosin  0.4 mg Oral Daily     PRN Meds:  Current Facility-Administered Medications:     0.9%  NaCl infusion (for blood administration), ,  Intravenous, Q24H PRN    melatonin, 6 mg, Oral, Nightly PRN    metoclopramide, 5 mg, Intravenous, Q6H PRN    naloxone, 0.02 mg, Intravenous, PRN    ondansetron, 4 mg, Intravenous, Q6H PRN    sodium chloride 0.9%, 10 mL, Intravenous, PRN    sodium chloride 0.9%, 10 mL, Intravenous, Q12H PRN     Objective:     Vital Signs (Most Recent):  Temp: 97.5 °F (36.4 °C) (02/24/25 0750)  Pulse: 77 (02/24/25 0829)  Resp: 19 (02/24/25 0750)  BP: (!) 142/76 (02/24/25 0750)  SpO2: 97 % (02/24/25 0750) Vital Signs (24h Range):  Temp:  [97.5 °F (36.4 °C)-99.2 °F (37.3 °C)] 97.5 °F (36.4 °C)  Pulse:  [67-90] 77  Resp:  [18-19] 19  SpO2:  [93 %-97 %] 97 %  BP: (109-142)/(65-76) 142/76     Weight: 66.6 kg (146 lb 13.2 oz)  Body mass index is 22.32 kg/m².    SpO2: 97 %       Intake/Output - Last 3 Shifts         02/22 0700  02/23 0659 02/23 0700  02/24 0659 02/24 0700  02/25 0659    P.O. 1680 660     I.V. (mL/kg) 2.4 (0)      Blood       IV Piggyback       Total Intake(mL/kg) 1682.4 (25.3) 660 (9.9)     Urine (mL/kg/hr) 1053 (0.7) 1950 (1.2) 200 (1.1)    Chest Tube 321 250     Total Output 1374 2200 200    Net +308.4 -1540 -200                   Lines/Drains/Airways       Drain  Duration                  Chest Tube 02/21/25 1401 Tube - 1 Left Mediastinal 2 days         Chest Tube 02/21/25 1401 Tube - 2 Right Mediastinal 2 days         Chest Tube 02/21/25 1401 Tube - 3 Left Pleural 2 days              Line  Duration                  Pacer Wires 02/21/25 1401 2 days              Peripheral Intravenous Line  Duration                  Peripheral IV - Single Lumen 02/19/25 1542 20 G Left;Posterior Forearm 4 days         Peripheral IV - Single Lumen 02/21/25 0948 16 G Right Antecubital 2 days                     Physical Exam  Constitutional:       General: He is not in acute distress.  HENT:      Head: Normocephalic and atraumatic.   Eyes:      Conjunctiva/sclera: Conjunctivae normal.   Cardiovascular:      Rate and Rhythm: Normal rate and  "regular rhythm.      Comments: Chest tubes to suction   Pacer wires in place   Pulmonary:      Effort: Pulmonary effort is normal. No respiratory distress.      Comments: 2L NC     Musculoskeletal:         General: No swelling. Normal range of motion.      Cervical back: Normal range of motion.   Skin:     Coloration: Skin is not pale.      Comments: Midline sternal incision c/d/i   Neurological:      General: No focal deficit present.      Mental Status: He is alert.   Psychiatric:         Mood and Affect: Mood normal.            Significant Labs:  BMP:   Recent Labs   Lab 02/24/25  0646   GLU 96      K 3.6      CO2 28   BUN 14   CREATININE 0.8   CALCIUM 7.9*   MG 1.9     Cardiac markers: No results for input(s): "CKMB", "CPKMB", "TROPONINT", "TROPONINI", "MYOGLOBIN" in the last 48 hours.  CBC:   Recent Labs   Lab 02/24/25  0646   WBC 7.08   RBC 2.72*   HGB 8.3*   HCT 25.6*   *   MCV 94   MCH 30.5   MCHC 32.4     CMP:   Recent Labs   Lab 02/24/25  0646   GLU 96   CALCIUM 7.9*   ALBUMIN 2.7*   PROT 4.9*      K 3.6   CO2 28      BUN 14   CREATININE 0.8   ALKPHOS 54   ALT 15   AST 31   BILITOT 0.5     Coagulation:   Recent Labs   Lab 02/23/25  0424   INR 1.1   APTT 26.8     Lactic Acid: No results for input(s): "LACTATE" in the last 48 hours.    Significant Diagnostics:  I have reviewed all pertinent imaging results/findings within the past 24 hours.  Assessment/Plan:     * S/P CABG (coronary artery bypass graft)  S/p CABG x2    - ASA, statin, BB  - Plavix for incomplete revascularization to circumflex system  - Maintain sternal precautions   - Lasix diuresis  - Multimodal pain management   - Encourage ambulation  - PT/OT  - Cardiac diet with 1500 cc fluid restriction   - Bowel regimen in place, suppository today  - Pepcid for ulcer prevention   - Pacer wires intact, plan to remove closer to discharge   - Possible chest tube removal (has 2 meds and 1 pleural)   - Monitor electrolytes to " keep Mag above 2 and Potassium above 4  - OOBTC  - Encourage IS use  - Flomax, south out   - oxygen wean as tolerated       Hypophosphatemia  Daily lab   Replaced PO     Acute blood loss anemia  CBC daily     Transient hyperglycemia post procedure  Endocrine following    CAD (coronary artery disease)  S/P CABG     Primary hypertension  BB     Mixed hyperlipidemia  statin    Generalized convulsive epilepsy with intractable epilepsy  Lamotrigine and lorazepam      Dispo: CSU. D/C when medically stable     Liliam Pelayo PA-C  Cardiothoracic Surgery  [facility]

## 2025-02-24 NOTE — CARE UPDATE
SIGN OFF    Discontinue BG monitoring. Will sign off. Please re-consult Endo as needed.    Patient has no Dx of DM, and is tolerating PO intake without BG excursions and/or complications.     Thank you for the consult.     Lab Results   Component Value Date    HGBA1C 5.2 01/29/2025     POCT Glucose   Date Value Ref Range Status   02/23/2025 119 (H) 70 - 110 mg/dL Final   02/23/2025 128 (H) 70 - 110 mg/dL Final   02/23/2025 113 (H) 70 - 110 mg/dL Final   02/22/2025 135 (H) 70 - 110 mg/dL Final   02/22/2025 126 (H) 70 - 110 mg/dL Final   02/22/2025 110 70 - 110 mg/dL Final   02/22/2025 100 70 - 110 mg/dL Final   02/22/2025 85 70 - 110 mg/dL Final   02/22/2025 70 70 - 110 mg/dL Final   02/22/2025 116 (H) 70 - 110 mg/dL Final   02/22/2025 115 (H) 70 - 110 mg/dL Final   02/22/2025 100 70 - 110 mg/dL Final   02/22/2025 105 70 - 110 mg/dL Final   02/22/2025 102 70 - 110 mg/dL Final   02/22/2025 103 70 - 110 mg/dL Final   02/22/2025 115 (H) 70 - 110 mg/dL Final   02/21/2025 137 (H) 70 - 110 mg/dL Final   02/21/2025 120 (H) 70 - 110 mg/dL Final   02/21/2025 137 (H) 70 - 110 mg/dL Final   02/21/2025 158 (H) 70 - 110 mg/dL Final   02/21/2025 147 (H) 70 - 110 mg/dL Final   02/21/2025 137 (H) 70 - 110 mg/dL Final   02/21/2025 122 (H) 70 - 110 mg/dL Final   02/21/2025 120 (H) 70 - 110 mg/dL Final   02/21/2025 119 (H) 70 - 110 mg/dL Final         ** Please call Endocrine for any BG related issues **      King Lundy DNP, FNP-C  Department of Endocrinology  Inpatient Glycemic Management

## 2025-02-24 NOTE — ASSESSMENT & PLAN NOTE
S/p CABG x2    - ASA, statin, BB  - Plavix for incomplete revascularization to circumflex system  - Maintain sternal precautions   - Lasix diuresis  - Multimodal pain management   - Encourage ambulation  - PT/OT  - Cardiac diet with 1500 cc fluid restriction   - Bowel regimen in place, suppository today  - Pepcid for ulcer prevention   - Pacer wires intact, plan to remove closer to discharge   - Possible chest tube removal (has 2 meds and 1 pleural)   - Monitor electrolytes to keep Mag above 2 and Potassium above 4  - OOBTC  - Encourage IS use  - Flomax, south out   - oxygen wean as tolerated

## 2025-02-24 NOTE — NURSING
Pt's external pacer was off due to low battery. Battery replaced, but pacer setting was not the same after pacer is turned back on. Back up rate changed to 50, but not sure of output and sensitivity settings and there is no setting order. CTS on-call MD notified and ok with 10 output and 2.0 sensitivity.

## 2025-02-24 NOTE — PT/OT/SLP PROGRESS
Occupational Therapy  Treatment    Name: Ye Partida  MRN: [id number]  Admitting Diagnosis: S/P CABG (coronary artery bypass graft)  Recent Surgery: Procedure(s) (LRB):  CORONARY ARTERY BYPASS GRAFT (CABG) (N/A)  HARVEST-VEIN-ENDOVASCULAR (Left) 3 Days Post-Op    Recommendations:     Discharge Recommendations: No Therapy Indicated  Discharge Equipment Recommendations: shower chair  Barriers to discharge: None    Assessment:     Ye Partida is a 69 y.o. male with a medical diagnosis of S/P CABG (coronary artery bypass graft). He presents with performance deficits affecting function including: impaired endurance, impaired self care skills, impaired functional mobility, gait instability, impaired balance, decreased coordination, decreased safety awareness, impaired cardiopulmonary response to activity. Patient pleasant and agreeable to participate in therapy session this PM. Patient making progress with established OT goals. Patient currently requires CGA-SBA with ADLs and CGA-SBA for transfers/functional mobility. Patient would benefit from continued skilled acute OT services in order to maximize IND with ADLs and functional mobility to ensure safe return to PLOF in the least restrictive environment.    Rehab Prognosis: Good; patient would benefit from acute OT services to address these deficits and reach maximum level of function.    Plan:     Patient to be seen 5 x/week to address the above listed problems via self-care/home management, therapeutic activities, therapeutic exercises, neuromuscular re-education  Plan of Care Expires: 03/01/25  Plan of Care Reviewed with: patient    Subjective     Chief Complaint: None stated by patient at this time. Patient agreeable to participate in therapy this PM.  Patient Comments/Goals: Patient with goal to return to PLOF.  Pain/Comfort:  Pain Rating 1: 0/10  Pain Rating Post-Intervention 1: other (see comments) (unrated)    Objective:     Communicated with RN  prior to session. Patient cleared to participate in therapy at this time. Patient found HOB elevated with telemetry, oxygen upon OT entry to room. No visitors present in room upon OT entry.    General Precautions: Standard, fall, sternal   Orthopedic Precautions: N/A   Braces: N/A  Respiratory Status: Nasal cannula, flow 1 L/min    Occupational Performance    Bed Mobility:  Patient completed Supine to Sit with stand by assistance, with verbal cues for maintenance of sternal precautions  Patient completed anterior Scooting towards the EOB with stand by assistance  Patient completed Sit to Supine with stand by assistance    Functional Mobility/Transfers:  Patient completed Sit <> Stand Transfer from EOB with stand by assistance with no assistive device, with verbal cues for maintenance of sternal precautions  Patient completed Toilet Transfer Step Transfer technique with contact guard assistance with no assistive device  Patient completed Sit <> Stand Transfer from chair with stand by assistance with no assistive device, with verbal cues for maintenance of sternal precautions  Patient completed Chair <> Bed Transfer using Step Transfer technique with contact guard assistance with no assistive device  Functional Mobility: Patient participated in in-room mobility to simulate home distances, ambulating to/from the restroom with contact guard assistance and no assistive device. Patient mildly unsteady, however no LOB noted. No SOB noted.    Activities of Daily Living:  Grooming: contact guard assistance to perform hand hygiene standing at sink  Upper Body Dressing: stand by assistance to dashawn gown in a robe-like fashion seated EOB  Lower Body Dressing: stand by assistance to dashawn B socks seated EOB  Toileting: contact guard assistance to perform clothing management at toilet; hygiene not performed at this time 2/2 merely simulating toileting, pt without need to use the restroom at this time    [facility] 6 Click ADL:  [facility]  Total Score: 20    Treatment & Education:  Patient educated on:   Role of OT, OT POC, and discharge planning.  Safe transfer techniques and proper body mechanics for fall prevention and improved independence with functional transfers.  Importance of OOB activities to increase endurance and tolerance for increased participation in daily ADLs.  Sternal precautions (no pushing, no pulling, and no lifting) and various compensatory/adaptive strategies to utilize with functional activities/ADLs to increase/maintain independence.  Utilizing the call bell to request for assistance with all functional mobility to ensure safety during hospital stay.  Whiteboard updated.    Patient verbalized understanding and all questions were addressed within the scope of OT.    Patient left HOB elevated with all lines intact, call button in reach, RN notified, and no visitors present.    GOALS:   Multidisciplinary Problems       Occupational Therapy Goals          Problem: Occupational Therapy    Goal Priority Disciplines Outcome Interventions   Occupational Therapy Goal     OT, PT/OT Progressing    Description: Goals to be met by: 03/01/2025     Patient will increase functional independence with ADLs by performing:    Feeding with [place].  UE Dressing with Set-up Assistance.  LE Dressing with Set-up Assistance.  Grooming while standing at sink with Set-up Assistance.  Toileting from toilet with Supervision for hygiene and clothing management.   Toilet transfer to toilet with Supervision.                       DME Justifications:  No DME recommended requiring DME justifications    Time Tracking:     OT Date of Treatment: 02/24/25  OT Start Time: 1250  OT Stop Time: 1308  OT Total Time (min): 18 min    Billable Minutes: Therapeutic Activity 18    2/24/2025

## 2025-02-24 NOTE — SUBJECTIVE & OBJECTIVE
Interval History: NAEON. Will likely remove some chest tubes today. OR dressing removed. Incision cleansed with betadine and covered with an island dressing. Bowel regimen changed. Patient reports having a stomach surgery in the past and only goes maybe 3 times per week. Will add Boost. Wean oxygen as tolerated. Electrolytes replaced.      Review of Systems   Constitutional: Negative for malaise/fatigue.   Cardiovascular:  Positive for dyspnea on exertion. Negative for chest pain and claudication.   Respiratory:  Negative for shortness of breath.    Hematologic/Lymphatic: Negative for bleeding problem.   Gastrointestinal:  Negative for abdominal pain.   Genitourinary:  Negative for dysuria.   Neurological:  Positive for weakness.     Medications:  Continuous Infusions:  Scheduled Meds:   acetaminophen  1,000 mg Oral Q6H    aspirin  81 mg Oral Daily    atorvastatin  40 mg Oral Daily    clopidogreL  75 mg Oral Daily    famotidine  20 mg Oral BID    furosemide (LASIX) injection  20 mg Intravenous BID    k phos di & mono-sod phos mono  2 tablet Oral BID    lamoTRIgine  200 mg Oral Daily    LORazepam  0.5 mg Oral BID    metoprolol tartrate  12.5 mg Oral BID    polyethylene glycol  17 g Oral Daily    potassium chloride  20 mEq Oral BID    senna-docusate 8.6-50 mg  2 tablet Oral BID    tamsulosin  0.4 mg Oral Daily     PRN Meds:  Current Facility-Administered Medications:     0.9%  NaCl infusion (for blood administration), , Intravenous, Q24H PRN    melatonin, 6 mg, Oral, Nightly PRN    metoclopramide, 5 mg, Intravenous, Q6H PRN    naloxone, 0.02 mg, Intravenous, PRN    ondansetron, 4 mg, Intravenous, Q6H PRN    sodium chloride 0.9%, 10 mL, Intravenous, PRN    sodium chloride 0.9%, 10 mL, Intravenous, Q12H PRN     Objective:     Vital Signs (Most Recent):  Temp: 97.5 °F (36.4 °C) (02/24/25 0750)  Pulse: 77 (02/24/25 0829)  Resp: 19 (02/24/25 0750)  BP: (!) 142/76 (02/24/25 0750)  SpO2: 97 % (02/24/25 0750) Vital Signs (24h  Range):  Temp:  [97.5 °F (36.4 °C)-99.2 °F (37.3 °C)] 97.5 °F (36.4 °C)  Pulse:  [67-90] 77  Resp:  [18-19] 19  SpO2:  [93 %-97 %] 97 %  BP: (109-142)/(65-76) 142/76     Weight: 66.6 kg (146 lb 13.2 oz)  Body mass index is 22.32 kg/m².    SpO2: 97 %       Intake/Output - Last 3 Shifts         02/22 0700  02/23 0659 02/23 0700  02/24 0659 02/24 0700  02/25 0659    P.O. 1680 660     I.V. (mL/kg) 2.4 (0)      Blood       IV Piggyback       Total Intake(mL/kg) 1682.4 (25.3) 660 (9.9)     Urine (mL/kg/hr) 1053 (0.7) 1950 (1.2) 200 (1.1)    Chest Tube 321 250     Total Output 1374 2200 200    Net +308.4 -1540 -200                   Lines/Drains/Airways       Drain  Duration                  Chest Tube 02/21/25 1401 Tube - 1 Left Mediastinal 2 days         Chest Tube 02/21/25 1401 Tube - 2 Right Mediastinal 2 days         Chest Tube 02/21/25 1401 Tube - 3 Left Pleural 2 days              Line  Duration                  Pacer Wires 02/21/25 1401 2 days              Peripheral Intravenous Line  Duration                  Peripheral IV - Single Lumen 02/19/25 1542 20 G Left;Posterior Forearm 4 days         Peripheral IV - Single Lumen 02/21/25 0948 16 G Right Antecubital 2 days                     Physical Exam  Constitutional:       General: He is not in acute distress.  HENT:      Head: Normocephalic and atraumatic.   Eyes:      Conjunctiva/sclera: Conjunctivae normal.   Cardiovascular:      Rate and Rhythm: Normal rate and regular rhythm.      Comments: Chest tubes to suction   Pacer wires in place   Pulmonary:      Effort: Pulmonary effort is normal. No respiratory distress.      Comments: 2L NC     Musculoskeletal:         General: No swelling. Normal range of motion.      Cervical back: Normal range of motion.   Skin:     Coloration: Skin is not pale.      Comments: Midline sternal incision c/d/i   Neurological:      General: No focal deficit present.      Mental Status: He is alert.   Psychiatric:         Mood and  "Affect: Mood normal.            Significant Labs:  BMP:   Recent Labs   Lab 02/24/25  0646   GLU 96      K 3.6      CO2 28   BUN 14   CREATININE 0.8   CALCIUM 7.9*   MG 1.9     Cardiac markers: No results for input(s): "CKMB", "CPKMB", "TROPONINT", "TROPONINI", "MYOGLOBIN" in the last 48 hours.  CBC:   Recent Labs   Lab 02/24/25  0646   WBC 7.08   RBC 2.72*   HGB 8.3*   HCT 25.6*   *   MCV 94   MCH 30.5   MCHC 32.4     CMP:   Recent Labs   Lab 02/24/25  0646   GLU 96   CALCIUM 7.9*   ALBUMIN 2.7*   PROT 4.9*      K 3.6   CO2 28      BUN 14   CREATININE 0.8   ALKPHOS 54   ALT 15   AST 31   BILITOT 0.5     Coagulation:   Recent Labs   Lab 02/23/25  0424   INR 1.1   APTT 26.8     Lactic Acid: No results for input(s): "LACTATE" in the last 48 hours.    Significant Diagnostics:  I have reviewed all pertinent imaging results/findings within the past 24 hours.  "

## 2025-02-24 NOTE — PT/OT/SLP PROGRESS
Physical Therapy Treatment    Patient Name:  Ye Partida   MRN:  [id number]    Recommendations:     Discharge Recommendations: No Therapy Indicated  Discharge Equipment Recommendations: none  Barriers to discharge: None    Assessment:     Ye Partida is a 69 y.o. male admitted with a medical diagnosis of S/P CABG (coronary artery bypass graft).  He presents with the following impairments/functional limitations: weakness, impaired endurance, decreased safety awareness, impaired skin, decreased ROM, impaired cardiopulmonary response to activity, gait instability . Patient showed improved endurance and mobility today, as the result of improved c/o dizziness. Patient needs to be reminded about sternal precautions when moving in bed and transferring from sit<>stand.    Rehab Prognosis: Good; patient would benefit from acute skilled PT services to address these deficits and reach maximum level of function.    Recent Surgery: Procedure(s) (LRB):  CORONARY ARTERY BYPASS GRAFT (CABG) (N/A)  HARVEST-VEIN-ENDOVASCULAR (Left) 3 Days Post-Op    Plan:     During this hospitalization, patient to be seen 5 x/week to address the identified rehab impairments via gait training, therapeutic activities and progress toward the following goals:    Plan of Care Expires:  03/20/25    Subjective     Chief Complaint: fatigue, as He was unable to sleep well last couple of nights.  Patient/Family Comments/goals: to heal and to get back to normal.  Pain/Comfort:  Pain Rating 1: 0/10  Location - Orientation 1: generalized  Location 1: sternal  Pain Rating Post-Intervention 1: 0/10      Objective:     Communicated with NSG prior to session.  Patient found HOB elevated with bed alarm, telemetry, oxygen upon PT entry to room.     General Precautions: Standard, fall, sternal  Orthopedic Precautions: N/A  Braces: N/A  Respiratory Status: Nasal cannula, flow 1 L/min     Functional Mobility:  Bed Mobility:     Rolling Right: stand by  assistance  Scooting: contact guard assistance  Supine to Sit: contact guard assistance  Sit to Supine: stand by assistance and contact guard assistance  Transfers:     Sit to Stand:  contact guard assistance with no AD  Gait: 102 ft x 2 trials with no AD with CGA, with a rest break sitting.      AM-PAC 6 CLICK MOBILITY  Turning over in bed (including adjusting bedclothes, sheets and blankets)?: 4  Sitting down on and standing up from a chair with arms (e.g., wheelchair, bedside commode, etc.): 3  Moving from lying on back to sitting on the side of the bed?: 3  Moving to and from a bed to a chair (including a wheelchair)?: 3  Need to walk in hospital room?: 3  Climbing 3-5 steps with a railing?: 3  Basic Mobility Total Score: 19       Treatment & Education:  Reviewed sternal restrictions. Adjusted gown and sat at EOB to prepare for treatment.     Patient left HOB elevated with all lines intact, call button in reach, and bed alarm on..    GOALS:   Multidisciplinary Problems       Physical Therapy Goals          Problem: Physical Therapy    Goal Priority Disciplines Outcome Interventions   Physical Therapy Goal     PT, PT/OT Progressing    Description: Goals to be met by: 3/3/25     Patient will increase functional independence with mobility by performin. Supine to sit with independent   2. Sit to stand transfer with Supervision  3. Gait  x 250 feet with Supervision .                          DME Justifications:  No DME recommended requiring DME justifications    Time Tracking:     PT Received On: 25  PT Start Time: 1510     PT Stop Time: 1534  PT Total Time (min): 24 min     Billable Minutes: Gait Training 12 and Therapeutic Activity 12    Treatment Type: Treatment  PT/PTA: PTA     Number of PTA visits since last PT visit: 2025

## 2025-02-24 NOTE — PLAN OF CARE
Pt maintained free from falls/trauma/injuries and skin breakdown. Pt denied pain or discomfort. Plan of care reviewed. Pt verbalized understanding. All questions and concerns addressed.     Problem: Adult Inpatient Plan of Care  Goal: Plan of Care Review  Outcome: Progressing  Goal: Patient-Specific Goal (Individualized)  Outcome: Progressing  Goal: Absence of Hospital-Acquired Illness or Injury  Outcome: Progressing  Goal: Optimal Comfort and Wellbeing  Outcome: Progressing  Goal: Readiness for Transition of Care  Outcome: Progressing     Problem: Wound  Goal: Optimal Coping  Outcome: Progressing  Goal: Optimal Functional Ability  Outcome: Progressing  Goal: Absence of Infection Signs and Symptoms  Outcome: Progressing  Goal: Improved Oral Intake  Outcome: Progressing  Goal: Optimal Pain Control and Function  Outcome: Progressing  Goal: Skin Health and Integrity  Outcome: Progressing  Goal: Optimal Wound Healing  Outcome: Progressing     Problem: Fall Injury Risk  Goal: Absence of Fall and Fall-Related Injury  Outcome: Progressing     Problem: Infection  Goal: Absence of Infection Signs and Symptoms  Outcome: Progressing     Problem: Restraint, Nonviolent  Goal: Absence of Harm or Injury  Outcome: Progressing     Problem: Skin Injury Risk Increased  Goal: Skin Health and Integrity  Outcome: Progressing

## 2025-02-24 NOTE — PLAN OF CARE
Plan of care reviewed with patient. Patient is AOX4 and VS stable. Patient remained free of falls and trauma, fall precautions are in place. Patient is ambulating with assist informed patient to call call light when getting out of bed. Patient done IS a few times throughout shift.  Patient has no complaints of pain. Chest tubes removed and pacemaker isolated. Patient has no questions at this time. Wheels are locked and the bed is in lowest position. The call bell is within reach. Telemetry is on.  Plan of care ongoing.         Problem: Adult Inpatient Plan of Care  Goal: Plan of Care Review  Outcome: Progressing  Goal: Patient-Specific Goal (Individualized)  Outcome: Progressing  Goal: Absence of Hospital-Acquired Illness or Injury  Outcome: Progressing  Goal: Optimal Comfort and Wellbeing  Outcome: Progressing  Goal: Readiness for Transition of Care  Outcome: Progressing     Problem: Wound  Goal: Optimal Coping  Outcome: Progressing  Goal: Optimal Functional Ability  Outcome: Progressing  Goal: Absence of Infection Signs and Symptoms  Outcome: Progressing  Goal: Improved Oral Intake  Outcome: Progressing

## 2025-02-25 ENCOUNTER — PATIENT MESSAGE (OUTPATIENT)
Dept: CARDIOTHORACIC SURGERY | Facility: CLINIC | Age: 70
End: 2025-02-25
Payer: MEDICARE

## 2025-02-25 ENCOUNTER — DOCUMENTATION ONLY (OUTPATIENT)
Dept: CARDIOTHORACIC SURGERY | Facility: CLINIC | Age: 70
End: 2025-02-25
Payer: MEDICARE

## 2025-02-25 VITALS
HEIGHT: 68 IN | RESPIRATION RATE: 19 BRPM | HEART RATE: 83 BPM | DIASTOLIC BLOOD PRESSURE: 74 MMHG | WEIGHT: 135.38 LBS | OXYGEN SATURATION: 94 % | BODY MASS INDEX: 20.52 KG/M2 | SYSTOLIC BLOOD PRESSURE: 116 MMHG | TEMPERATURE: 98 F

## 2025-02-25 LAB
ALBUMIN SERPL BCP-MCNC: 2.9 G/DL (ref 3.5–5.2)
ALP SERPL-CCNC: 65 U/L (ref 40–150)
ALT SERPL W/O P-5'-P-CCNC: 19 U/L (ref 10–44)
ANION GAP SERPL CALC-SCNC: 11 MMOL/L (ref 8–16)
AST SERPL-CCNC: 32 U/L (ref 10–40)
BASOPHILS # BLD AUTO: 0.03 K/UL (ref 0–0.2)
BASOPHILS NFR BLD: 0.4 % (ref 0–1.9)
BILIRUB SERPL-MCNC: 0.7 MG/DL (ref 0.1–1)
BUN SERPL-MCNC: 12 MG/DL (ref 8–23)
CALCIUM SERPL-MCNC: 8.5 MG/DL (ref 8.7–10.5)
CHLORIDE SERPL-SCNC: 103 MMOL/L (ref 95–110)
CO2 SERPL-SCNC: 27 MMOL/L (ref 23–29)
CREAT SERPL-MCNC: 0.7 MG/DL (ref 0.5–1.4)
DIFFERENTIAL METHOD BLD: ABNORMAL
EOSINOPHIL # BLD AUTO: 0.2 K/UL (ref 0–0.5)
EOSINOPHIL NFR BLD: 2.4 % (ref 0–8)
ERYTHROCYTE [DISTWIDTH] IN BLOOD BY AUTOMATED COUNT: 15.1 % (ref 11.5–14.5)
EST. GFR  (NO RACE VARIABLE): >60 ML/MIN/1.73 M^2
GLUCOSE SERPL-MCNC: 104 MG/DL (ref 70–110)
HCT VFR BLD AUTO: 31.1 % (ref 40–54)
HGB BLD-MCNC: 10.2 G/DL (ref 14–18)
IMM GRANULOCYTES # BLD AUTO: 0.05 K/UL (ref 0–0.04)
IMM GRANULOCYTES NFR BLD AUTO: 0.6 % (ref 0–0.5)
LYMPHOCYTES # BLD AUTO: 0.7 K/UL (ref 1–4.8)
LYMPHOCYTES NFR BLD: 9 % (ref 18–48)
MAGNESIUM SERPL-MCNC: 1.9 MG/DL (ref 1.6–2.6)
MCH RBC QN AUTO: 30.4 PG (ref 27–31)
MCHC RBC AUTO-ENTMCNC: 32.8 G/DL (ref 32–36)
MCV RBC AUTO: 93 FL (ref 82–98)
MONOCYTES # BLD AUTO: 0.8 K/UL (ref 0.3–1)
MONOCYTES NFR BLD: 9.6 % (ref 4–15)
NEUTROPHILS # BLD AUTO: 6.1 K/UL (ref 1.8–7.7)
NEUTROPHILS NFR BLD: 78 % (ref 38–73)
NRBC BLD-RTO: 0 /100 WBC
PHOSPHATE SERPL-MCNC: 2.2 MG/DL (ref 2.7–4.5)
PLATELET # BLD AUTO: 187 K/UL (ref 150–450)
PMV BLD AUTO: 10.4 FL (ref 9.2–12.9)
POTASSIUM SERPL-SCNC: 3.5 MMOL/L (ref 3.5–5.1)
PROT SERPL-MCNC: 5.6 G/DL (ref 6–8.4)
RBC # BLD AUTO: 3.35 M/UL (ref 4.6–6.2)
SODIUM SERPL-SCNC: 141 MMOL/L (ref 136–145)
WBC # BLD AUTO: 7.78 K/UL (ref 3.9–12.7)

## 2025-02-25 PROCEDURE — 25000003 PHARM REV CODE 250

## 2025-02-25 PROCEDURE — 25000003 PHARM REV CODE 250: Performed by: PHYSICIAN ASSISTANT

## 2025-02-25 PROCEDURE — 85025 COMPLETE CBC W/AUTO DIFF WBC: CPT

## 2025-02-25 PROCEDURE — 63600175 PHARM REV CODE 636 W HCPCS: Performed by: STUDENT IN AN ORGANIZED HEALTH CARE EDUCATION/TRAINING PROGRAM

## 2025-02-25 PROCEDURE — 97116 GAIT TRAINING THERAPY: CPT

## 2025-02-25 PROCEDURE — 84100 ASSAY OF PHOSPHORUS: CPT

## 2025-02-25 PROCEDURE — 83735 ASSAY OF MAGNESIUM: CPT | Performed by: STUDENT IN AN ORGANIZED HEALTH CARE EDUCATION/TRAINING PROGRAM

## 2025-02-25 PROCEDURE — 80053 COMPREHEN METABOLIC PANEL: CPT

## 2025-02-25 PROCEDURE — 25000003 PHARM REV CODE 250: Performed by: STUDENT IN AN ORGANIZED HEALTH CARE EDUCATION/TRAINING PROGRAM

## 2025-02-25 PROCEDURE — 97535 SELF CARE MNGMENT TRAINING: CPT

## 2025-02-25 RX ORDER — AMOXICILLIN 250 MG
2 CAPSULE ORAL 2 TIMES DAILY PRN
Qty: 28 TABLET | Refills: 0 | Status: SHIPPED | OUTPATIENT
Start: 2025-02-25 | End: 2025-03-04

## 2025-02-25 RX ORDER — METOPROLOL TARTRATE 25 MG/1
25 TABLET, FILM COATED ORAL 2 TIMES DAILY
Qty: 60 TABLET | Refills: 11 | Status: SHIPPED | OUTPATIENT
Start: 2025-02-25 | End: 2026-02-25

## 2025-02-25 RX ORDER — ACETAMINOPHEN 500 MG
1000 TABLET ORAL EVERY 6 HOURS PRN
Qty: 30 TABLET | Refills: 0 | Status: SHIPPED | OUTPATIENT
Start: 2025-02-25

## 2025-02-25 RX ORDER — POTASSIUM CHLORIDE 20 MEQ/1
TABLET, EXTENDED RELEASE ORAL
Qty: 45 TABLET | Refills: 3 | Status: SHIPPED | OUTPATIENT
Start: 2025-02-25 | End: 2025-06-02

## 2025-02-25 RX ORDER — FUROSEMIDE 20 MG/1
TABLET ORAL
Qty: 45 TABLET | Refills: 3 | Status: SHIPPED | OUTPATIENT
Start: 2025-02-25 | End: 2025-05-26

## 2025-02-25 RX ORDER — TAMSULOSIN HYDROCHLORIDE 0.4 MG/1
0.4 CAPSULE ORAL DAILY
Qty: 30 CAPSULE | Refills: 0 | Status: SHIPPED | OUTPATIENT
Start: 2025-02-26 | End: 2026-02-26

## 2025-02-25 RX ORDER — METOPROLOL TARTRATE 25 MG/1
25 TABLET, FILM COATED ORAL 2 TIMES DAILY
Status: DISCONTINUED | OUTPATIENT
Start: 2025-02-25 | End: 2025-02-25 | Stop reason: HOSPADM

## 2025-02-25 RX ORDER — NAPROXEN SODIUM 220 MG/1
81 TABLET, FILM COATED ORAL DAILY
Qty: 30 TABLET | Refills: 11 | Status: SHIPPED | OUTPATIENT
Start: 2025-02-26 | End: 2026-02-26

## 2025-02-25 RX ORDER — POTASSIUM CHLORIDE 20 MEQ/1
40 TABLET, EXTENDED RELEASE ORAL 2 TIMES DAILY
Status: DISCONTINUED | OUTPATIENT
Start: 2025-02-25 | End: 2025-02-25 | Stop reason: HOSPADM

## 2025-02-25 RX ORDER — ATORVASTATIN CALCIUM 40 MG/1
40 TABLET, FILM COATED ORAL DAILY
Qty: 30 TABLET | Refills: 11 | Status: SHIPPED | OUTPATIENT
Start: 2025-02-26 | End: 2026-02-26

## 2025-02-25 RX ORDER — TALC
6 POWDER (GRAM) TOPICAL NIGHTLY PRN
Qty: 30 TABLET | Refills: 0 | Status: SHIPPED | OUTPATIENT
Start: 2025-02-25

## 2025-02-25 RX ORDER — CLOPIDOGREL BISULFATE 75 MG/1
75 TABLET ORAL DAILY
Qty: 30 TABLET | Refills: 3 | Status: SHIPPED | OUTPATIENT
Start: 2025-02-26 | End: 2026-02-26

## 2025-02-25 RX ORDER — FAMOTIDINE 20 MG/1
20 TABLET, FILM COATED ORAL 2 TIMES DAILY
Qty: 60 TABLET | Refills: 0 | Status: SHIPPED | OUTPATIENT
Start: 2025-02-25 | End: 2026-02-25

## 2025-02-25 RX ADMIN — ATORVASTATIN CALCIUM 40 MG: 40 TABLET, FILM COATED ORAL at 08:02

## 2025-02-25 RX ADMIN — FUROSEMIDE 20 MG: 10 INJECTION, SOLUTION INTRAMUSCULAR; INTRAVENOUS at 08:02

## 2025-02-25 RX ADMIN — METOPROLOL TARTRATE 25 MG: 25 TABLET, FILM COATED ORAL at 08:02

## 2025-02-25 RX ADMIN — LORAZEPAM 0.5 MG: 0.5 TABLET ORAL at 08:02

## 2025-02-25 RX ADMIN — TAMSULOSIN HYDROCHLORIDE 0.4 MG: 0.4 CAPSULE ORAL at 08:02

## 2025-02-25 RX ADMIN — LAMOTRIGINE 200 MG: 100 TABLET ORAL at 08:02

## 2025-02-25 RX ADMIN — FAMOTIDINE 20 MG: 20 TABLET, FILM COATED ORAL at 08:02

## 2025-02-25 RX ADMIN — DIBASIC SODIUM PHOSPHATE, MONOBASIC POTASSIUM PHOSPHATE AND MONOBASIC SODIUM PHOSPHATE 2 TABLET: 852; 155; 130 TABLET ORAL at 08:02

## 2025-02-25 RX ADMIN — ASPIRIN 81 MG CHEWABLE TABLET 81 MG: 81 TABLET CHEWABLE at 08:02

## 2025-02-25 RX ADMIN — CLOPIDOGREL BISULFATE 75 MG: 75 TABLET ORAL at 08:02

## 2025-02-25 RX ADMIN — POTASSIUM CHLORIDE 40 MEQ: 1500 TABLET, EXTENDED RELEASE ORAL at 08:02

## 2025-02-25 NOTE — PT/OT/SLP PROGRESS
"Occupational Therapy   Treatment/Discharge Summary     Name: Ye Partida  MRN: [id number]  Admitting Diagnosis:  S/P CABG (coronary artery bypass graft)  4 Days Post-Op    Recommendations:     Discharge Recommendations: No Therapy Indicated  Discharge Equipment Recommendations:  none  Barriers to discharge:  None    Assessment:     Ye Partida is a 69 y.o. male with a medical diagnosis of S/P CABG (coronary artery bypass graft).  Pt tolerated session well and without incident.  He has since d/c from the hospital at time of this note.  He presents with the following.  Performance deficits affecting function are impaired endurance, impaired self care skills, impaired functional mobility, gait instability, decreased safety awareness, impaired cardiopulmonary response to activity (sternal precautions).     Plan:     (Pt has since d/c from the hospital at time of note.)   Plan of Care Expires: 03/01/25  Plan of Care Reviewed with: patient, spouse    Subjective     Chief Complaint: sternal pain when coughing  Patient/Family Comments/goals: pt was pleasant and agreeable to therapy  Pain/Comfort:  Pain Rating 1: other (see comments) (pt said "there's no pain unless I cough")  Location - Orientation 1: generalized  Location 1: sternal  Pain Addressed 1: Distraction  Pain Rating Post-Intervention 1: other (see comments) (unchanged)    Objective:     Communicated with: nurse prior to session.  Patient found up in chair with telemetry with his wife present upon OT entry to room.    General Precautions: Standard, fall, sternal    Orthopedic Precautions:N/A  Braces: N/A  Respiratory Status: Room air     Occupational Performance:     Bed Mobility:    N/A due to pt's sitting in bedside chair at beginning and end of session    Functional Mobility/Transfers:  Patient completed Sit <> Stand Transfer from bedside chair x 1 trial with supervision with no assistive device, requiring cueing to adhere to his sternal " precautions   Functional Mobility: Pt ambulated ~12 ft x 2 trials with SBA with no AD.  He reported no SOB or dizziness and had no LOB.     Activities of Daily Living:  Grooming: stand by assistance to perform oral care while standing at bathroom sink      [facility] 6 Click ADL: 19    Treatment & Education:  Pt and his wife edu on role of OT, POC, his sternal precautions, safety when performing self care tasks, benefit of performing OOB activity, and safety when performing functional transfers and mobility.    - Self care tasks completed-- as noted above      Patient left up in chair with all lines intact, call button in reach, and his wife present    GOALS:   Multidisciplinary Problems       Occupational Therapy Goals          Problem: Occupational Therapy    Goal Priority Disciplines Outcome Interventions   Occupational Therapy Goal     OT, PT/OT Progressing    Description: Goals to be met by: 03/01/2025     Patient will increase functional independence with ADLs by performing:    Feeding with [place].  UE Dressing with Set-up Assistance.  LE Dressing with Set-up Assistance.  Grooming while standing at sink with Set-up Assistance.  Toileting from toilet with Supervision for hygiene and clothing management.   Toilet transfer to toilet with Supervision.                         DME Justifications:  No DME recommended requiring DME justifications    Time Tracking:     OT Date of Treatment: 02/25/25  OT Start Time: 0956  OT Stop Time: 1004  OT Total Time (min): 8 min    Billable Minutes:Self Care/Home Management 8 min    OT/ROLANDO: OT     Number of ROLANDO visits since last OT visit: 0    2/25/2025

## 2025-02-25 NOTE — PLAN OF CARE
Recommendations     1. Continue cardiac diet as tolerated   2. Continue boost plus - modify to once daily  3. Encourage good intake   4. RD to monitor weight, labs, intake     Goals:   1. % nutritional needs met with diet   2. Maintain weight during admission  Nutrition Goal Status: progressing towards goal  Communication of RD Recs: other (comment) (POC)     Nutrition Discharge Planning     Nutrition Discharge Planning: Therapeutic diet (comments), General healthy diet  Therapeutic diet (comments): cardiac diet

## 2025-02-25 NOTE — PT/OT/SLP PROGRESS
Physical Therapy Treatment    Patient Name:  Ye Partida   MRN:  [id number]  Admit Date: 2025  Admitting Diagnosis:  S/P CABG (coronary artery bypass graft)   Length of Stay: 5 days  Recent Surgery: Procedure(s) (LRB):  CORONARY ARTERY BYPASS GRAFT (CABG) (N/A)  HARVEST-VEIN-ENDOVASCULAR (Left) 4 Days Post-Op    Recommendations:     Discharge Recommendations:  No Therapy Indicated   Discharge Equipment Recommendations: none   Barriers to discharge: none    Plan:     During this hospitalization, patient to be seen 5 x/week to address the listed problems via gait training, therapeutic exercises, therapeutic activities, neuromuscular re-education  Plan of Care Expires:  25  Plan of Care Reviewed with: patient    Assessment:     Ye Partida is a 69 y.o. male admitted with a medical diagnosis of S/P CABG (coronary artery bypass graft). Pt progressing towards goals, but not at PLOF. Pt tolerated session well.   Pt is improving with therapy evidenced by increased gait distance. SpO2 > 90% on room air during ambulation trial. Will continue to progress mobility per patient's tolerance. Pt would benefit from continued acute PT to maximize functional mobility after surgical intervention.      Problem List: impaired endurance, impaired functional mobility, gait instability, impaired cardiopulmonary response to activity, decreased safety awareness.  Rehab Prognosis: Good     GOALS:   Multidisciplinary Problems       Physical Therapy Goals          Problem: Physical Therapy    Goal Priority Disciplines Outcome Interventions   Physical Therapy Goal     PT, PT/OT Progressing    Description: Goals to be met by: 3/3/25     Patient will increase functional independence with mobility by performin. Supine to sit with independent   2. Sit to stand transfer with Supervision  3. Gait  x 250 feet with Supervision .                          Subjective   Communicated with RN prior to session.  Patient found HOB  "elevated upon PT entry to room, agreeable to evaluation. Ye Partida's alone during session. Telesitter present.    Chief Complaint: none reported   Patient/Family Comments/goals: to get better and return home   Pain/Comfort:  Pain Rating 1: 0/10  Pain Rating Post-Intervention 1: 0/10    Objective:   Patient found with: telemetry   General Precautions: Standard, Cardiac fall, sternal   Orthopedic Precautions:N/A   Braces: N/A   Oxygen Device: Nasal Cannula  Vitals: BP (!) 162/92 (BP Location: Right arm, Patient Position: Lying)   Pulse 83   Temp 98.3 °F (36.8 °C) (Oral)   Resp 18   Ht 5' 8" (1.727 m)   Wt 61.4 kg (135 lb 5.8 oz)   SpO2 (!) 91%   BMI 20.58 kg/m²     Outcome Measures:  AM-PAC 6 CLICK MOBILITY  Turning over in bed (including adjusting bedclothes, sheets and blankets)?: 4  Sitting down on and standing up from a chair with arms (e.g., wheelchair, bedside commode, etc.): 3  Moving from lying on back to sitting on the side of the bed?: 4  Moving to and from a bed to a chair (including a wheelchair)?: 3  Need to walk in hospital room?: 3  Climbing 3-5 steps with a railing?: 3  Basic Mobility Total Score: 20    Functional Mobility:  Additional staff present: NA  Bed Mobility:   Supine to Sit: independence; HOB elevated  Scooting anteriorly to EOB to have both feet planted on floor: independence    Sitting Balance at Edge of Bed:  Assistance Level Required: [place]    Transfers:   Sit <> Stand Transfer: stand by assistance with no assistive device from EOB      Gait:  Patient ambulated: 170ft + 170ft (seated rest break between trials)   Patient required: CGA progressing to SBA  Patient used: no assistive device  Gait Pattern observed: reciprocal gait  Gait Deviation(s): occasional unsteady gait, decreased step length, narrow base of support, and decreased srikanth  Impairments due to: impaired balance, decreased strength, and decreased endurance  Comments:   Pt with improved gait stability " throughout gait trial   No SOB  Verbal cueing for attention to task, purposeful steps, and normal arm swing      Therapeutic Activities, Exercises, and Education:   Educated pt on PT role/POC  Educated pt on importance of OOB activity and daily ambulation   Educated pt on sternal precautions  Pt verbalized understanding     T/f to chair to increase tolerance to OOB activity and to create optimal positioning for lung expansion       Patient left up in chair with all lines intact, call button in reach, RN notified, and wife and telesitter present..    Time Tracking:     PT Received On: 02/25/25  PT Start Time: 0935     PT Stop Time: 0951  PT Total Time (min): 16 min       Billable Minutes:   Gait Training 12    Treatment Type: Treatment  PT/PTA: PT

## 2025-02-25 NOTE — PROGRESS NOTES
Met with pt at bedside and reviewed home wound care instructions upon discharge for pt's midsternal and chest tube site incisions, and left leg vein harvest site, s/p CABG.  Reviewed daily inspection and cleaning of surgical incisions once discharged and instructed pt to call the clinic with any questions or concerns.  Pt instructed to remove dressings in place once discharged, with the exception of steristrips if present, and to leave incisions open to air.  Pt instructed to wash directly over his surgical incisions daily with soap and water, pat them dry with a clean towel, leave them open to air, and refrain from applying anything to his incisions, other than soap and water.  Pt instructed to inspect his incisions daily and to notify Dr. Leal's office if he notices any openings, redness, swelling, or drainage, or if he experiences any fevers.  Pt provided with a hand-out (see below) which contains detailed instructions on daily wound care inspection and care, once discharged.  Pt reminded of his 5 pound lifting, pushing, and pulling restrictions for the first 6 weeks following his surgery and to refrain from driving until released by Dr. Leal.  Pt instructed to continue utilizing his Incentive Spirometer to expand his lungs and prevent pneumonia. Pt instructed to weigh himself every morning, after waking up and going to the restroom, and to notify the clinic if he experiences a 3 pound weight gain from one morning to the next.  Pt encouraged to walk as much as possible throughout each day.  Pt reminded of daily sodium restriction.  Pt informed of his post-op appts on 3/24 and was provided with a copy of his appts for that day.  Pt verbalized understanding to all instructions and denied having any questions at this time.        Showering   If your incisions are healing and there is no drainage - it is ok to take a shower.   Shower with your back to the shower spray.  It is ok for your incision to get wet, but  the shower spray should not directly hit your chest.  Do not soak in a tub.   The water temperature should be warm -- not too hot or cold. Extreme water temperatures can cause you to feel faint.  It is expected that you wash your incisions when you shower, however only use soap and water to cleanse the sites.  Use normal bar soap, not perfumed soap or body wash. During your recovery, do not try a new brand of soap.   Place soapy water on your hand or a clean washcloth and gently wash your incisions using an up-and-down motion.   Do not apply ointments, oils, or salves to your incisions.   Pat the skin gently to dry.      Wash your hands before and after caring for or touching your incisions.  Look in the mirror daily. Inspect your incisions for redness, drainage and warmth. Your incisions should be healing; there should NOT be an increase in opening.  Gently wash your incisions everyday with soap and warm water using a clean washcloth, or your hand and light touch.  Gently pat dry with a clean towel.  You do not need to cover your incisions unless they are draining.  If you are experiencing drainage, it is important to call your doctor.       When to call your doctor:   Increased drainage or oozing from the incision(s)  Increased opening of the incision line(s) - there should not be gaps or pulling apart areas of the incision(s)  Redness along the incision(s) - if the incisions were red or pink when you left the hospital, they should be improving and not becoming more red  Warmth along the incision line(s)  Increased body temperature / Fever - greater than 101 degrees Fahrenheit  If you have diabetes and your blood sugar levels begin to vary

## 2025-02-25 NOTE — PROGRESS NOTES
"[facility] - Cardiology Stepdown  Adult Nutrition  Progress Note    SUMMARY       Recommendations    1. Continue cardiac diet as tolerated   2. Continue boost plus - modify to once daily  3. Encourage good intake   4. RD to monitor weight, labs, intake    Goals:   1. % nutritional needs met with diet   2. Maintain weight during admission  Nutrition Goal Status: progressing towards goal  Communication of RD Recs: other (comment) (POC)    Nutrition Discharge Planning    Nutrition Discharge Planning: Therapeutic diet (comments), General healthy diet  Therapeutic diet (comments): cardiac diet    Assessment and Plan    Nutrition Problem  Increased nutrient needs (protein)     Related to (etiology):  Increased demand for protein 2/2 physiological needs/wound healing     Signs and Symptoms (as evidenced by):  S/p CABG     Interventions/Recommendations (treatment strategy):  Collaboration of nutritional care with other providers  [id number]     Nutrition Diagnosis Status:  Continues    Reason for Assessment    Reason For Assessment: RD follow-up  Diagnosis: cardiac disease (S/P CABG (coronary artery bypass graft))  General Information Comments: Pt admitted with  S/P CABG (coronary artery bypass graft). Pt having good intake - PO: %. No GI s/s - except constipation - BM: 2/25. Fluid intake encouraged.    Nutrition/Diet History    Spiritual, Cultural Beliefs, Shinto Practices, Values that Affect Care: no  Food Allergies: NKFA  Factors Affecting Nutritional Intake: None identified at this time    Nutrition Related Social Determinants of Health: SDOH: None Identified    Food Insecurity: No Food Insecurity (2/23/2025)    Hunger Vital Sign     Worried About Running Out of Food in the Last Year: Never true     Ran Out of Food in the Last Year: Never true     Anthropometrics    Height: 5' 8" (172.7 cm)  Height (inches): 68 in  Height Method: Stated  Weight: 61.4 kg (135 lb 5.8 oz)  Weight (lb): 135.36 " lb  Weight Method: Standard Scale  Ideal Body Weight (IBW), Male: 154 lb  % Ideal Body Weight, Male (lb): 88.18 %  BMI (Calculated): 20.6  BMI Grade: 18.5-24.9 - normal    Lab/Procedures/Meds    Pertinent Labs Reviewed: reviewed  Pertinent Labs Comments: H/H 10.2/31.1 low, Ca 8.5 low, P 2.2 low, albumin 2.9 low  Pertinent Medications Reviewed: reviewed  Pertinent Medications Comments: aspirin, atorvastatin, clopidogrel, famotidine, furosemide, metoprolol, k phos di and mono-sod phos mono, senna, potassium chloride    Estimated/Assessed Needs    Weight Used For Calorie Calculations: 61.4 kg (135 lb 5.8 oz)  Energy Calorie Requirements (kcal): 1760 kcal  Energy Need Method: [place]-St Jeor (* 1.3)  Protein Requirements: 62-74 g/pro (1.0-1.2 g/kg)  Weight Used For Protein Calculations: 61.4 kg (135 lb 5.8 oz)        RDA Method (mL): 1760    Nutrition Prescription Ordered    Current Diet Order: cardiac  Oral Nutrition Supplement: boost plus TID    Evaluation of Received Nutrient/Fluid Intake    Energy Calories Required: meeting needs  Protein Required: meeting needs  Tolerance: tolerating  % Intake of Estimated Energy Needs: 75 - 100 %  % Meal Intake: 75 - 100 %    Nutrition Risk    Level of Risk/Frequency of Follow-up: low     Monitor and Evaluation    Monitor and Evaluation: Energy intake, Food and beverage intake, Protein intake, Diet order, Food and nutrition knowledge, Weight, Beliefs and attitudes, Electrolyte and renal panel, Gastrointestinal profile, Glucose/endocrine profile, Inflammatory profile, Lipid profile, Nutrition focused physical findings     Nutrition Follow-Up    RD Follow-up?: Yes

## 2025-02-25 NOTE — DISCHARGE SUMMARY
[facility] - Cardiology Stepdown  Cardiothoracic Surgery  Discharge Summary      Patient Name: Ye Partida  MRN: [id number]  Admission Date: 2/19/2025  Hospital Length of Stay: 5 days  Discharge Date and Time:  02/25/2025 9:50 AM  Attending Physician: Flavio Leal MD   Discharging Provider: Liliam Pelayo PA-C  Primary Care Provider: Cameron Tran MD    HPI:    This is a 69-year-old male who presented with positive stress test and was found to have coronary artery disease.  Patient has started to have unstable angina and was admitted from the cath lab.  Extensive discussions were carried out and after understanding risks and benefits patient wanted to proceed with surgical revascularization.  Patient was started on a heparin drip and consented for surgery.     Procedure(s) (LRB):  CORONARY ARTERY BYPASS GRAFT (CABG) (N/A)  HARVEST-VEIN-ENDOVASCULAR (Left)      Indwelling Lines/Drains at time of discharge:   Lines/Drains/Airways       Line  Duration                  Pacer Wires 02/21/25 1401 3 days                  Hospital Course: On 2/21/25, the patient was taken to the Operating Room for the above stated procedure. Please see the previously dictated operative report for complete details. Postoperatively, the patient was taken from the  Operating Room to the ICU where the vital signs were monitored and pain was kept under control. The patient was weaned from the drips and extubated in the ICU per protocol. Patient had some post operative delirium so narcotics were discontinued. Once hemodynamically stable, the patient was transferred to the Cardiac Step-Down floor for continued strengthening and ambulation. On postoperative day 5, the patient was ready for discharge to home. At the time of discharge, the patient was ambulating unassisted. Pain was well controlled with oral analgesics and the patient was tolerating the diet.     MOBILITY AND ACTIVITY: As tolerated. Patient may shower. No heavy  lifting of greater than 5 pounds and no driving.     DIET: An 1800-calorie ADA with a 1500 mL fluid restriction.     WOUND CARE INSTRUCTIONS: Check for redness, swelling and drainage around the  incision or wound. Patient is to call for any obvious bleeding, drainage, pus from the wound, unusual problems or difficulties or temperature of greater than 101   degrees.     FOLLOWUP: Follow up with Dr. Leal in approximately 3 weeks. Prior to this  appointment, the patient will have a chest x-ray and EKG.     Patient not placed on Ace-Inhibitor at the time of discharge due to potential for hypotension     Has follow up with cardiologist to discuss PCI of circumflex system     DISCHARGE CONDITION: At the time of discharge, the patient was in sinus rhythm and afebrile with stable vital signs.      Goals of Care Treatment Preferences:  Code Status: Full Code      Consults (From admission, onward)          Status Ordering Provider     Consult to Endocrinology  Once        Provider:  (Not yet assigned)    Completed NABEEL MAR     Inpatient consult to Registered Dietitian/Nutritionist  Once        Provider:  (Not yet assigned)    Completed NABEEL MAR     Inpatient consult to Cardiac Rehab  Once        Provider:  (Not yet assigned)    Completed MARTINEZ THAKUR     Inpatient consult to Cardiothoracic Surgery  Once        Provider:  (Not yet assigned)    Completed YASH KAUR     Inpatient consult to Cardiology  Once        Provider:  (Not yet assigned)    Completed TEJA SHEIKH            Significant Diagnostic Studies:     Pending Diagnostic Studies:       Procedure Component Value Units Date/Time    CBC Without Differential [[id number]] Collected: 02/22/25 0309    Order Status: Sent Lab Status: No result     Specimen: Blood     Comprehensive Metabolic Panel [[id number]] Collected: 02/22/25 0309    Order Status: Sent Lab Status: No result     Specimen: Blood     EKG 12-lead [[id number]]     Order Status: Sent  Lab Status: No result     Magnesium [[id number]] Collected: 02/22/25 0309    Order Status: Sent Lab Status: No result     Specimen: Blood     Phosphorus [[id number]] Collected: 02/22/25 0309    Order Status: Sent Lab Status: No result     Specimen: Blood             No new Assessment & Plan notes have been filed under this hospital service since the last note was generated.  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  Medications:  Reconciled Home Medications:      Medication List        START taking these medications      acetaminophen 500 MG tablet  Commonly known as: TYLENOL  Take 2 tablets (1,000 mg total) by mouth every 6 (six) hours as needed for Pain.     aspirin 81 MG Chew  Take 1 tablet (81 mg total) by mouth once daily.  Start taking on: February 26, 2025     atorvastatin 40 MG tablet  Commonly known as: LIPITOR  Take 1 tablet (40 mg total) by mouth once daily.  Start taking on: February 26, 2025     clopidogreL 75 mg tablet  Commonly known as: PLAVIX  Take 1 tablet (75 mg total) by mouth once daily.  Start taking on: February 26, 2025     famotidine 20 MG tablet  Commonly known as: PEPCID  Take 1 tablet (20 mg total) by mouth 2 (two) times daily.      furosemide 20 MG tablet  Commonly known as: LASIX  Take one tablet by mouth twice daily for 7 days then decrease to once daily     melatonin 3 mg tablet  Commonly known as: MELATIN  Take 2 tablets (6 mg total) by mouth nightly as needed for Insomnia.     metoprolol tartrate 25 MG tablet  Commonly known as: LOPRESSOR  Take 1 tablet (25 mg total) by mouth 2 (two) times daily.     potassium chloride SA 20 MEQ tablet  Commonly known as: K-DUR,KLOR-CON  Take one tablet by mouth twice daily for 7 days then decrease to once daily     senna-docusate 8.6-50 mg 8.6-50 mg per tablet  Commonly known as: PERICOLACE  Take 2 tablets by mouth 2 (two) times daily as needed for Constipation.     tamsulosin 0.4 mg Cap  Commonly known as: FLOMAX  Take 1 capsule (0.4 mg total) by mouth once daily.  Start taking on: February 26, 2025            CONTINUE taking these medications      lamoTRIgine 200 MG tablet  Commonly known as: LAMICTAL  Take 1 tablet by mouth  daily     LORazepam 0.5 MG tablet  Commonly known as: ATIVAN  Take 1 tablet (0.5 mg total) by mouth 2 (two) times daily. Take 1/2 tablet (0.25mg) in am, take 1 tablet (0.5mg) in pm     VITAMIN C 1000 MG tablet  Generic drug: ascorbic acid (vitamin C)  Take 1,000 mg by mouth once daily.            STOP taking these medications      ARTHRITIS STRENGTH BC POWDER ORAL            ASK your doctor about these medications      VITAMIN B-12 100 MCG tablet  Generic drug: cyanocobalamin  Take 100 mcg by mouth once daily.            Time spent on the discharge of patient: 30 minutes    Liliam Pelayo PA-C  Cardiothoracic Surgery  [facility] - Cardiology Stepdown

## 2025-02-25 NOTE — NURSING
Patient is ready for discharge. Patient stable alert and oriented. IVs removed. No complaints of pain. Discussed discharge plan. Reviewed medications,appointments, and answered questions with patient and wife. Patient wife went to pharmacy to pick Rx up. Patient left via wheelchair.

## 2025-02-25 NOTE — HOSPITAL COURSE
On 2/21/25, the patient was taken to the Operating Room for the above stated procedure. Please see the previously dictated operative report for complete details. Postoperatively, the patient was taken from the  Operating Room to the ICU where the vital signs were monitored and pain was kept under control. The patient was weaned from the drips and extubated in the ICU per protocol. Patient had some post operative delirium so narcotics were discontinued. Once hemodynamically stable, the patient was transferred to the Cardiac Step-Down floor for continued strengthening and ambulation. On postoperative day 5, the patient was ready for discharge to home. At the time of discharge, the patient was ambulating unassisted. Pain was well controlled with oral analgesics and the patient was tolerating the diet.     MOBILITY AND ACTIVITY: As tolerated. Patient may shower. No heavy lifting of greater than 5 pounds and no driving.     DIET: An 1800-calorie ADA with a 1500 mL fluid restriction.     WOUND CARE INSTRUCTIONS: Check for redness, swelling and drainage around the  incision or wound. Patient is to call for any obvious bleeding, drainage, pus from the wound, unusual problems or difficulties or temperature of greater than 101   degrees.     FOLLOWUP: Follow up with Dr. Leal in approximately 3 weeks. Prior to this  appointment, the patient will have a chest x-ray and EKG.     Patient not placed on Ace-Inhibitor at the time of discharge due to potential for hypotension     Has follow up with cardiologist to discuss PCI of circumflex system     DISCHARGE CONDITION: At the time of discharge, the patient was in sinus rhythm and afebrile with stable vital signs.

## 2025-02-26 ENCOUNTER — TELEPHONE (OUTPATIENT)
Dept: CARDIOTHORACIC SURGERY | Facility: CLINIC | Age: 70
End: 2025-02-26
Payer: MEDICARE

## 2025-02-26 NOTE — PLAN OF CARE
[facility]  Discharge Final Note    Primary Care Provider: Cameron Tran MD    Expected Discharge Date: 2/25/2025    Final Discharge Note (most recent)       Final Note - 02/25/25 1309          Final Note    Anticipated Discharge Disposition Home or Self Care     Hospital Resources/Appts/Education Provided Provided patient/caregiver with written discharge plan information                 Pt was discussed during a.m. care team huddle and has discharge orders. Pt Dispo: home with family and no needs.         RENATE Najera  [id number]

## 2025-02-26 NOTE — TELEPHONE ENCOUNTER
Called pt following hospital discharge.  Reiterated the need for pt to wash his incisions everyday with soap and water, refraining from the application of anything other than soap and water to his incisions, and to pat them dry with a clean towel, leaving them open to air.  Pt instructed to inspect his incisions daily and to notify Dr. Leal's office if he notices any openings, redness, swelling, or drainage to his incisions, or if he experiences any fevers.  Pt instructed to walk as much as he can throughout each day.  Reminded pt not to drive for the first 4 weeks after surgery, and to refrain from lifting, pushing, and pulling anything greater than 5 pounds for the first 6 weeks following his surgery.  Instructed pt to perform daily weights.  Pt instructed to notify the clinic if he has a weight gain greater than 3 pounds in one day.  Pt instructed to continue utilizing his Incentive Spirometer to expand his lungs and prevent pneumonia.  Pt reminded of his daily sodium restriction.  Pt reminded about post-op appts on 3/24.  Pt instructed to call the clinic with any questions or concerns.  Pt verbalized understanding to all instructions and denied questions at this time.

## 2025-02-28 ENCOUNTER — TELEPHONE (OUTPATIENT)
Dept: CARDIOTHORACIC SURGERY | Facility: CLINIC | Age: 70
End: 2025-02-28
Payer: MEDICARE

## 2025-02-28 LAB
POCT GLUCOSE: 104 MG/DL (ref 70–110)
POCT GLUCOSE: 117 MG/DL (ref 70–110)

## 2025-02-28 NOTE — TELEPHONE ENCOUNTER
Discussed patient's low blood pressure with JESSICA Pelayo. Pt is on Lasix BID. If he is not retaining fluid, he should stop Lasix and drink fluid. If he continues to have consistently low blood pressures and feel dizzy or faint, he should come in and get IV fluids.    Called patient. He stated he is not having problems with fluid and blood pressure increased to 125/75 after eating saltines and drinking coke. Instructed him to stop Lasix and K+ and continue to drink fluids. I will check in with him again on Monday. Explained if he consistently is low and symptoms of above, he should come in to get fluids.    Pt is concerned that it is the metoprolol that is causing his low blood pressure. Explained to patient that the metoprolol can have a small effect on blood pressure, but it is mainly given to keep pulse from getting elevated. Pt then asked if he knew me or had met me in the hospital. I stated I had not met him, but I had discussed the situation with JESSICA Pelayo and he is familiar with her. He accepted the instruction to stop the Lasix.    Will check in with him on Monday.    Julie Haase RN  CTS Nurse Navigator [phone]      ----- Message from [email or web address] sent at 2/28/2025  4:18 PM CST -----  Regarding: Advice / urgent  Contact: [phone]  Who call ? Ye Partida  What is the request Details : Pt calling to speak with someone in provider office regarding  procedure on 2/21. States he concerned   his blood pressure is 75/46.   Please call pt back.    Can clinic  use patient portal  : no What number to call back : [phone]

## 2025-03-03 ENCOUNTER — TELEPHONE (OUTPATIENT)
Dept: CARDIOTHORACIC SURGERY | Facility: CLINIC | Age: 70
End: 2025-03-03
Payer: MEDICARE

## 2025-03-03 NOTE — TELEPHONE ENCOUNTER
Called patient to follow up with BP. Left message with call back number.    Julie Haase RN  [facility] Nurse Navigator [phone]

## 2025-03-08 ENCOUNTER — HOSPITAL ENCOUNTER (INPATIENT)
Facility: HOSPITAL | Age: 70
LOS: 3 days | Discharge: HOME | DRG: 377 | End: 2025-03-11
Attending: EMERGENCY MEDICINE | Admitting: STUDENT IN AN ORGANIZED HEALTH CARE EDUCATION/TRAINING PROGRAM
Payer: MEDICARE

## 2025-03-08 DIAGNOSIS — I50.9 HEART FAILURE: ICD-10-CM

## 2025-03-08 DIAGNOSIS — R42 DIZZINESS: ICD-10-CM

## 2025-03-08 DIAGNOSIS — R07.9 CHEST PAIN: ICD-10-CM

## 2025-03-08 DIAGNOSIS — K92.0 GASTROINTESTINAL HEMORRHAGE WITH HEMATEMESIS: ICD-10-CM

## 2025-03-08 DIAGNOSIS — K92.2 GI BLEED: Primary | ICD-10-CM

## 2025-03-08 LAB
ABO + RH BLD: NORMAL
ALBUMIN SERPL BCP-MCNC: 2.7 G/DL (ref 3.5–5.2)
ALP SERPL-CCNC: 83 U/L (ref 40–150)
ALT SERPL W/O P-5'-P-CCNC: 12 U/L (ref 10–44)
ANION GAP SERPL CALC-SCNC: 9 MMOL/L (ref 8–16)
AST SERPL-CCNC: 27 U/L (ref 10–40)
BASOPHILS # BLD AUTO: 0.08 K/UL (ref 0–0.2)
BASOPHILS NFR BLD: 0.4 % (ref 0–1.9)
BILIRUB SERPL-MCNC: 0.2 MG/DL (ref 0.1–1)
BILIRUB UR QL STRIP: NEGATIVE
BLD GP AB SCN CELLS X3 SERPL QL: NORMAL
BLD PROD TYP BPU: NORMAL
BLOOD UNIT EXPIRATION DATE: NORMAL
BLOOD UNIT TYPE CODE: 6200
BLOOD UNIT TYPE: NORMAL
BUN SERPL-MCNC: 34 MG/DL (ref 6–30)
BUN SERPL-MCNC: 36 MG/DL (ref 8–23)
CALCIUM SERPL-MCNC: 8 MG/DL (ref 8.7–10.5)
CHLORIDE SERPL-SCNC: 104 MMOL/L (ref 95–110)
CHLORIDE SERPL-SCNC: 109 MMOL/L (ref 95–110)
CLARITY UR REFRACT.AUTO: CLEAR
CO2 SERPL-SCNC: 26 MMOL/L (ref 23–29)
CODING SYSTEM: NORMAL
COLOR UR AUTO: YELLOW
CREAT SERPL-MCNC: 0.8 MG/DL (ref 0.5–1.4)
CREAT SERPL-MCNC: 1 MG/DL (ref 0.5–1.4)
CROSSMATCH INTERPRETATION: NORMAL
DIFFERENTIAL METHOD BLD: ABNORMAL
DISPENSE STATUS: NORMAL
EOSINOPHIL # BLD AUTO: 0.1 K/UL (ref 0–0.5)
EOSINOPHIL NFR BLD: 0.2 % (ref 0–8)
ERYTHROCYTE [DISTWIDTH] IN BLOOD BY AUTOMATED COUNT: 14.7 % (ref 11.5–14.5)
EST. GFR  (NO RACE VARIABLE): >60 ML/MIN/1.73 M^2
GLUCOSE SERPL-MCNC: 126 MG/DL (ref 70–110)
GLUCOSE SERPL-MCNC: 126 MG/DL (ref 70–110)
GLUCOSE UR QL STRIP: NEGATIVE
HCT VFR BLD AUTO: 27.7 % (ref 40–54)
HCT VFR BLD CALC: 23 %PCV (ref 36–54)
HGB BLD-MCNC: 8.7 G/DL (ref 14–18)
HGB UR QL STRIP: NEGATIVE
IMM GRANULOCYTES # BLD AUTO: 0.2 K/UL (ref 0–0.04)
IMM GRANULOCYTES NFR BLD AUTO: 0.9 % (ref 0–0.5)
INFLUENZA A, MOLECULAR: NOT DETECTED
INFLUENZA B, MOLECULAR: NOT DETECTED
KETONES UR QL STRIP: NEGATIVE
LACTATE SERPL-SCNC: 1.8 MMOL/L (ref 0.5–2.2)
LEUKOCYTE ESTERASE UR QL STRIP: NEGATIVE
LIPASE SERPL-CCNC: 23 U/L (ref 4–60)
LYMPHOCYTES # BLD AUTO: 1.2 K/UL (ref 1–4.8)
LYMPHOCYTES NFR BLD: 5.7 % (ref 18–48)
MCH RBC QN AUTO: 29.4 PG (ref 27–31)
MCHC RBC AUTO-ENTMCNC: 31.4 G/DL (ref 32–36)
MCV RBC AUTO: 94 FL (ref 82–98)
MONOCYTES # BLD AUTO: 1 K/UL (ref 0.3–1)
MONOCYTES NFR BLD: 4.4 % (ref 4–15)
NEUTROPHILS # BLD AUTO: 19.2 K/UL (ref 1.8–7.7)
NEUTROPHILS NFR BLD: 88.4 % (ref 38–73)
NITRITE UR QL STRIP: NEGATIVE
NRBC BLD-RTO: 0 /100 WBC
PH UR STRIP: 5 [PH] (ref 5–8)
PLATELET # BLD AUTO: 786 K/UL (ref 150–450)
PMV BLD AUTO: 9.4 FL (ref 9.2–12.9)
POC IONIZED CALCIUM: 1.15 MMOL/L (ref 1.06–1.42)
POC TCO2 (MEASURED): 26 MMOL/L (ref 23–29)
POTASSIUM BLD-SCNC: 3.7 MMOL/L (ref 3.5–5.1)
POTASSIUM SERPL-SCNC: 3.7 MMOL/L (ref 3.5–5.1)
PROT SERPL-MCNC: 5.1 G/DL (ref 6–8.4)
PROT UR QL STRIP: NEGATIVE
RBC # BLD AUTO: 2.96 M/UL (ref 4.6–6.2)
RSV AG BY MOLECULAR METHOD: NOT DETECTED
SAMPLE: ABNORMAL
SARS-COV-2 RNA RESP QL NAA+PROBE: NOT DETECTED
SODIUM BLD-SCNC: 143 MMOL/L (ref 136–145)
SODIUM SERPL-SCNC: 144 MMOL/L (ref 136–145)
SP GR UR STRIP: 1.03 (ref 1–1.03)
SPECIMEN OUTDATE: NORMAL
TRANS ERYTHROCYTES VOL PATIENT: NORMAL ML
URN SPEC COLLECT METH UR: NORMAL
WBC # BLD AUTO: 21.68 K/UL (ref 3.9–12.7)

## 2025-03-08 PROCEDURE — 81003 URINALYSIS AUTO W/O SCOPE: CPT | Performed by: STUDENT IN AN ORGANIZED HEALTH CARE EDUCATION/TRAINING PROGRAM

## 2025-03-08 PROCEDURE — 96375 TX/PRO/DX INJ NEW DRUG ADDON: CPT

## 2025-03-08 PROCEDURE — 0241U SARS-COV2 (COVID) WITH FLU/RSV BY PCR: CPT | Performed by: STUDENT IN AN ORGANIZED HEALTH CARE EDUCATION/TRAINING PROGRAM

## 2025-03-08 PROCEDURE — 63600175 PHARM REV CODE 636 W HCPCS

## 2025-03-08 PROCEDURE — 63600175 PHARM REV CODE 636 W HCPCS: Performed by: EMERGENCY MEDICINE

## 2025-03-08 PROCEDURE — P9021 RED BLOOD CELLS UNIT: HCPCS | Performed by: EMERGENCY MEDICINE

## 2025-03-08 PROCEDURE — 30233N1 TRANSFUSION OF NONAUTOLOGOUS RED BLOOD CELLS INTO PERIPHERAL VEIN, PERCUTANEOUS APPROACH: ICD-10-PCS | Performed by: INTERNAL MEDICINE

## 2025-03-08 PROCEDURE — 36430 TRANSFUSION BLD/BLD COMPNT: CPT

## 2025-03-08 PROCEDURE — 96374 THER/PROPH/DIAG INJ IV PUSH: CPT

## 2025-03-08 PROCEDURE — 83605 ASSAY OF LACTIC ACID: CPT

## 2025-03-08 PROCEDURE — 85025 COMPLETE CBC W/AUTO DIFF WBC: CPT

## 2025-03-08 PROCEDURE — 99291 CRITICAL CARE FIRST HOUR: CPT | Mod: ,,, | Performed by: NURSE PRACTITIONER

## 2025-03-08 PROCEDURE — 25000003 PHARM REV CODE 250: Performed by: STUDENT IN AN ORGANIZED HEALTH CARE EDUCATION/TRAINING PROGRAM

## 2025-03-08 PROCEDURE — 93005 ELECTROCARDIOGRAM TRACING: CPT

## 2025-03-08 PROCEDURE — 12000002 HC ACUTE/MED SURGE SEMI-PRIVATE ROOM

## 2025-03-08 PROCEDURE — 80053 COMPREHEN METABOLIC PANEL: CPT | Performed by: EMERGENCY MEDICINE

## 2025-03-08 PROCEDURE — 93010 ELECTROCARDIOGRAM REPORT: CPT | Mod: ,,, | Performed by: INTERNAL MEDICINE

## 2025-03-08 PROCEDURE — 94761 N-INVAS EAR/PLS OXIMETRY MLT: CPT

## 2025-03-08 PROCEDURE — 80047 BASIC METABLC PNL IONIZED CA: CPT

## 2025-03-08 PROCEDURE — 86900 BLOOD TYPING SEROLOGIC ABO: CPT

## 2025-03-08 PROCEDURE — 86920 COMPATIBILITY TEST SPIN: CPT | Performed by: EMERGENCY MEDICINE

## 2025-03-08 PROCEDURE — 83690 ASSAY OF LIPASE: CPT | Performed by: EMERGENCY MEDICINE

## 2025-03-08 PROCEDURE — 99285 EMERGENCY DEPT VISIT HI MDM: CPT | Mod: 25

## 2025-03-08 RX ORDER — LORAZEPAM 0.5 MG/1
0.5 TABLET ORAL 2 TIMES DAILY
Status: DISCONTINUED | OUTPATIENT
Start: 2025-03-08 | End: 2025-03-11 | Stop reason: HOSPADM

## 2025-03-08 RX ORDER — ATORVASTATIN CALCIUM 40 MG/1
40 TABLET, FILM COATED ORAL DAILY
Status: DISCONTINUED | OUTPATIENT
Start: 2025-03-09 | End: 2025-03-11 | Stop reason: HOSPADM

## 2025-03-08 RX ORDER — GLUCAGON 1 MG
1 KIT INJECTION
Status: DISCONTINUED | OUTPATIENT
Start: 2025-03-08 | End: 2025-03-08

## 2025-03-08 RX ORDER — PROCHLORPERAZINE MALEATE 5 MG
10 TABLET ORAL EVERY 6 HOURS PRN
Status: DISCONTINUED | OUTPATIENT
Start: 2025-03-08 | End: 2025-03-11 | Stop reason: HOSPADM

## 2025-03-08 RX ORDER — HYDROCODONE BITARTRATE AND ACETAMINOPHEN 500; 5 MG/1; MG/1
TABLET ORAL
Status: DISCONTINUED | OUTPATIENT
Start: 2025-03-08 | End: 2025-03-11 | Stop reason: HOSPADM

## 2025-03-08 RX ORDER — NALOXONE HCL 0.4 MG/ML
0.2 VIAL (ML) INJECTION
Status: DISCONTINUED | OUTPATIENT
Start: 2025-03-08 | End: 2025-03-11 | Stop reason: HOSPADM

## 2025-03-08 RX ORDER — POLYETHYLENE GLYCOL 3350 17 G/17G
17 POWDER, FOR SOLUTION ORAL DAILY PRN
Status: DISCONTINUED | OUTPATIENT
Start: 2025-03-08 | End: 2025-03-11 | Stop reason: HOSPADM

## 2025-03-08 RX ORDER — SODIUM CHLORIDE 0.9 % (FLUSH) 0.9 %
10 SYRINGE (ML) INJECTION EVERY 12 HOURS PRN
Status: DISCONTINUED | OUTPATIENT
Start: 2025-03-08 | End: 2025-03-11 | Stop reason: HOSPADM

## 2025-03-08 RX ORDER — ACETAMINOPHEN 325 MG/1
650 TABLET ORAL EVERY 4 HOURS PRN
Status: DISCONTINUED | OUTPATIENT
Start: 2025-03-08 | End: 2025-03-11 | Stop reason: HOSPADM

## 2025-03-08 RX ORDER — SODIUM CHLORIDE 0.9 % (FLUSH) 0.9 %
10 SYRINGE (ML) INJECTION
Status: DISCONTINUED | OUTPATIENT
Start: 2025-03-08 | End: 2025-03-08

## 2025-03-08 RX ORDER — GLUCAGON 1 MG
1 KIT INJECTION
Status: DISCONTINUED | OUTPATIENT
Start: 2025-03-08 | End: 2025-03-09

## 2025-03-08 RX ORDER — PANTOPRAZOLE SODIUM 40 MG/10ML
40 INJECTION, POWDER, LYOPHILIZED, FOR SOLUTION INTRAVENOUS 2 TIMES DAILY
Status: DISCONTINUED | OUTPATIENT
Start: 2025-03-09 | End: 2025-03-11 | Stop reason: HOSPADM

## 2025-03-08 RX ORDER — ONDANSETRON HYDROCHLORIDE 2 MG/ML
4 INJECTION, SOLUTION INTRAVENOUS
Status: COMPLETED | OUTPATIENT
Start: 2025-03-08 | End: 2025-03-08

## 2025-03-08 RX ORDER — AMOXICILLIN 250 MG
1 CAPSULE ORAL 2 TIMES DAILY PRN
Status: DISCONTINUED | OUTPATIENT
Start: 2025-03-08 | End: 2025-03-11 | Stop reason: HOSPADM

## 2025-03-08 RX ORDER — SODIUM CHLORIDE 9 MG/ML
INJECTION, SOLUTION INTRAVENOUS CONTINUOUS
Status: ACTIVE | OUTPATIENT
Start: 2025-03-09 | End: 2025-03-09

## 2025-03-08 RX ORDER — IBUPROFEN 200 MG
24 TABLET ORAL
Status: DISCONTINUED | OUTPATIENT
Start: 2025-03-08 | End: 2025-03-09

## 2025-03-08 RX ORDER — ONDANSETRON 8 MG/1
8 TABLET, ORALLY DISINTEGRATING ORAL EVERY 8 HOURS PRN
Status: DISCONTINUED | OUTPATIENT
Start: 2025-03-08 | End: 2025-03-11 | Stop reason: HOSPADM

## 2025-03-08 RX ORDER — PANTOPRAZOLE SODIUM 40 MG/10ML
80 INJECTION, POWDER, LYOPHILIZED, FOR SOLUTION INTRAVENOUS
Status: COMPLETED | OUTPATIENT
Start: 2025-03-08 | End: 2025-03-08

## 2025-03-08 RX ORDER — TALC
6 POWDER (GRAM) TOPICAL NIGHTLY PRN
Status: DISCONTINUED | OUTPATIENT
Start: 2025-03-08 | End: 2025-03-08

## 2025-03-08 RX ORDER — CEFTRIAXONE 2 G/1
2 INJECTION, POWDER, FOR SOLUTION INTRAMUSCULAR; INTRAVENOUS
Status: COMPLETED | OUTPATIENT
Start: 2025-03-08 | End: 2025-03-08

## 2025-03-08 RX ORDER — IBUPROFEN 200 MG
16 TABLET ORAL
Status: DISCONTINUED | OUTPATIENT
Start: 2025-03-08 | End: 2025-03-09

## 2025-03-08 RX ORDER — INSULIN ASPART 100 [IU]/ML
0-5 INJECTION, SOLUTION INTRAVENOUS; SUBCUTANEOUS EVERY 6 HOURS PRN
Status: DISCONTINUED | OUTPATIENT
Start: 2025-03-08 | End: 2025-03-09

## 2025-03-08 RX ORDER — TALC
6 POWDER (GRAM) TOPICAL NIGHTLY PRN
Status: DISCONTINUED | OUTPATIENT
Start: 2025-03-08 | End: 2025-03-11 | Stop reason: HOSPADM

## 2025-03-08 RX ADMIN — LORAZEPAM 0.5 MG: 0.5 TABLET ORAL at 10:03

## 2025-03-08 RX ADMIN — CEFTRIAXONE SODIUM 2 G: 2 INJECTION, POWDER, FOR SOLUTION INTRAMUSCULAR; INTRAVENOUS at 07:03

## 2025-03-08 RX ADMIN — PANTOPRAZOLE SODIUM 80 MG: 40 INJECTION, POWDER, LYOPHILIZED, FOR SOLUTION INTRAVENOUS at 07:03

## 2025-03-08 RX ADMIN — ONDANSETRON 4 MG: 2 INJECTION INTRAMUSCULAR; INTRAVENOUS at 09:03

## 2025-03-08 NOTE — Clinical Note
Diagnosis: GI bleed [[id number]]   Future Attending Provider: MERLINE MCQUEEN [[id number]]   Reason for IP Medical Treatment  (Clinical interventions that can only be accomplished in the IP setting? ) :: GI bleed   Plans for Post-Acute care--if anticipated (pick the single best option):: A. No post acute care anticipated at this time

## 2025-03-09 PROBLEM — G40.909 SEIZURE DISORDER: Status: ACTIVE | Noted: 2025-03-09

## 2025-03-09 PROBLEM — D72.829 LEUKOCYTOSIS: Status: ACTIVE | Noted: 2025-03-09

## 2025-03-09 PROBLEM — R57.9 SHOCK: Status: ACTIVE | Noted: 2025-03-09

## 2025-03-09 PROBLEM — D64.9 SYMPTOMATIC ANEMIA: Status: ACTIVE | Noted: 2025-03-09

## 2025-03-09 PROBLEM — R55 SYNCOPE: Status: ACTIVE | Noted: 2025-03-09

## 2025-03-09 PROBLEM — R90.89 ABNORMAL BRAIN CT: Status: ACTIVE | Noted: 2025-03-09

## 2025-03-09 PROBLEM — I50.20 HFREF (HEART FAILURE WITH REDUCED EJECTION FRACTION): Status: ACTIVE | Noted: 2025-03-09

## 2025-03-09 LAB
ALBUMIN SERPL BCP-MCNC: 2.6 G/DL (ref 3.5–5.2)
ALP SERPL-CCNC: 75 U/L (ref 40–150)
ALT SERPL W/O P-5'-P-CCNC: 10 U/L (ref 10–44)
ANION GAP SERPL CALC-SCNC: 11 MMOL/L (ref 8–16)
APTT PPP: 21.5 SEC (ref 21–32)
AST SERPL-CCNC: 17 U/L (ref 10–40)
BASOPHILS # BLD AUTO: 0.05 K/UL (ref 0–0.2)
BASOPHILS # BLD AUTO: 0.06 K/UL (ref 0–0.2)
BASOPHILS # BLD AUTO: 0.07 K/UL (ref 0–0.2)
BASOPHILS NFR BLD: 0.3 % (ref 0–1.9)
BASOPHILS NFR BLD: 0.3 % (ref 0–1.9)
BASOPHILS NFR BLD: 0.4 % (ref 0–1.9)
BILIRUB SERPL-MCNC: 0.7 MG/DL (ref 0.1–1)
BNP SERPL-MCNC: 146 PG/ML (ref 0–99)
BUN SERPL-MCNC: 42 MG/DL (ref 8–23)
CALCIUM SERPL-MCNC: 7.9 MG/DL (ref 8.7–10.5)
CHLORIDE SERPL-SCNC: 110 MMOL/L (ref 95–110)
CO2 SERPL-SCNC: 21 MMOL/L (ref 23–29)
CREAT SERPL-MCNC: 0.8 MG/DL (ref 0.5–1.4)
DIFFERENTIAL METHOD BLD: ABNORMAL
EOSINOPHIL # BLD AUTO: 0 K/UL (ref 0–0.5)
EOSINOPHIL # BLD AUTO: 0 K/UL (ref 0–0.5)
EOSINOPHIL # BLD AUTO: 0.1 K/UL (ref 0–0.5)
EOSINOPHIL NFR BLD: 0.2 % (ref 0–8)
EOSINOPHIL NFR BLD: 0.2 % (ref 0–8)
EOSINOPHIL NFR BLD: 0.5 % (ref 0–8)
ERYTHROCYTE [DISTWIDTH] IN BLOOD BY AUTOMATED COUNT: 14.5 % (ref 11.5–14.5)
ERYTHROCYTE [DISTWIDTH] IN BLOOD BY AUTOMATED COUNT: 14.6 % (ref 11.5–14.5)
ERYTHROCYTE [DISTWIDTH] IN BLOOD BY AUTOMATED COUNT: 14.9 % (ref 11.5–14.5)
EST. GFR  (NO RACE VARIABLE): >60 ML/MIN/1.73 M^2
GLUCOSE SERPL-MCNC: 129 MG/DL (ref 70–110)
HCT VFR BLD AUTO: 22.4 % (ref 40–54)
HCT VFR BLD AUTO: 24.1 % (ref 40–54)
HCT VFR BLD AUTO: 24.2 % (ref 40–54)
HGB BLD-MCNC: 7 G/DL (ref 14–18)
HGB BLD-MCNC: 7.6 G/DL (ref 14–18)
HGB BLD-MCNC: 7.7 G/DL (ref 14–18)
IMM GRANULOCYTES # BLD AUTO: 0.12 K/UL (ref 0–0.04)
IMM GRANULOCYTES # BLD AUTO: 0.12 K/UL (ref 0–0.04)
IMM GRANULOCYTES # BLD AUTO: 0.13 K/UL (ref 0–0.04)
IMM GRANULOCYTES NFR BLD AUTO: 0.6 % (ref 0–0.5)
IMM GRANULOCYTES NFR BLD AUTO: 0.7 % (ref 0–0.5)
IMM GRANULOCYTES NFR BLD AUTO: 0.7 % (ref 0–0.5)
INR PPP: 1.1 (ref 0.8–1.2)
LACTATE SERPL-SCNC: 1.7 MMOL/L (ref 0.5–2.2)
LACTATE SERPL-SCNC: 2.2 MMOL/L (ref 0.5–2.2)
LYMPHOCYTES # BLD AUTO: 1.3 K/UL (ref 1–4.8)
LYMPHOCYTES # BLD AUTO: 1.9 K/UL (ref 1–4.8)
LYMPHOCYTES # BLD AUTO: 2.2 K/UL (ref 1–4.8)
LYMPHOCYTES NFR BLD: 13.4 % (ref 18–48)
LYMPHOCYTES NFR BLD: 6.8 % (ref 18–48)
LYMPHOCYTES NFR BLD: 9.5 % (ref 18–48)
MAGNESIUM SERPL-MCNC: 2 MG/DL (ref 1.6–2.6)
MCH RBC QN AUTO: 29.5 PG (ref 27–31)
MCH RBC QN AUTO: 29.7 PG (ref 27–31)
MCH RBC QN AUTO: 29.8 PG (ref 27–31)
MCHC RBC AUTO-ENTMCNC: 31.3 G/DL (ref 32–36)
MCHC RBC AUTO-ENTMCNC: 31.5 G/DL (ref 32–36)
MCHC RBC AUTO-ENTMCNC: 31.8 G/DL (ref 32–36)
MCV RBC AUTO: 93 FL (ref 82–98)
MCV RBC AUTO: 94 FL (ref 82–98)
MCV RBC AUTO: 95 FL (ref 82–98)
MONOCYTES # BLD AUTO: 1.1 K/UL (ref 0.3–1)
MONOCYTES # BLD AUTO: 1.4 K/UL (ref 0.3–1)
MONOCYTES # BLD AUTO: 1.6 K/UL (ref 0.3–1)
MONOCYTES NFR BLD: 5.8 % (ref 4–15)
MONOCYTES NFR BLD: 8 % (ref 4–15)
MONOCYTES NFR BLD: 8.8 % (ref 4–15)
MRSA SCREEN BY PCR: NOT DETECTED
NEUTROPHILS # BLD AUTO: 12.5 K/UL (ref 1.8–7.7)
NEUTROPHILS # BLD AUTO: 15.9 K/UL (ref 1.8–7.7)
NEUTROPHILS # BLD AUTO: 16.3 K/UL (ref 1.8–7.7)
NEUTROPHILS NFR BLD: 76.2 % (ref 38–73)
NEUTROPHILS NFR BLD: 81.3 % (ref 38–73)
NEUTROPHILS NFR BLD: 86.3 % (ref 38–73)
NRBC BLD-RTO: 0 /100 WBC
OHS QRS DURATION: 72 MS
OHS QTC CALCULATION: 428 MS
PHOSPHATE SERPL-MCNC: 3.8 MG/DL (ref 2.7–4.5)
PLATELET # BLD AUTO: 398 K/UL (ref 150–450)
PLATELET # BLD AUTO: 410 K/UL (ref 150–450)
PLATELET # BLD AUTO: 503 K/UL (ref 150–450)
PMV BLD AUTO: 9.3 FL (ref 9.2–12.9)
PMV BLD AUTO: 9.6 FL (ref 9.2–12.9)
PMV BLD AUTO: 9.7 FL (ref 9.2–12.9)
POCT GLUCOSE: 124 MG/DL (ref 70–110)
POCT GLUCOSE: 128 MG/DL (ref 70–110)
POTASSIUM SERPL-SCNC: 3.5 MMOL/L (ref 3.5–5.1)
PROT SERPL-MCNC: 4.6 G/DL (ref 6–8.4)
PROTHROMBIN TIME: 11.9 SEC (ref 9–12.5)
RBC # BLD AUTO: 2.36 M/UL (ref 4.6–6.2)
RBC # BLD AUTO: 2.58 M/UL (ref 4.6–6.2)
RBC # BLD AUTO: 2.58 M/UL (ref 4.6–6.2)
SODIUM SERPL-SCNC: 142 MMOL/L (ref 136–145)
WBC # BLD AUTO: 16.37 K/UL (ref 3.9–12.7)
WBC # BLD AUTO: 18.84 K/UL (ref 3.9–12.7)
WBC # BLD AUTO: 19.48 K/UL (ref 3.9–12.7)

## 2025-03-09 PROCEDURE — 25000003 PHARM REV CODE 250: Performed by: INTERNAL MEDICINE

## 2025-03-09 PROCEDURE — 87040 BLOOD CULTURE FOR BACTERIA: CPT | Performed by: STUDENT IN AN ORGANIZED HEALTH CARE EDUCATION/TRAINING PROGRAM

## 2025-03-09 PROCEDURE — 25000003 PHARM REV CODE 250: Performed by: STUDENT IN AN ORGANIZED HEALTH CARE EDUCATION/TRAINING PROGRAM

## 2025-03-09 PROCEDURE — 85610 PROTHROMBIN TIME: CPT | Performed by: STUDENT IN AN ORGANIZED HEALTH CARE EDUCATION/TRAINING PROGRAM

## 2025-03-09 PROCEDURE — 63600175 PHARM REV CODE 636 W HCPCS: Performed by: STUDENT IN AN ORGANIZED HEALTH CARE EDUCATION/TRAINING PROGRAM

## 2025-03-09 PROCEDURE — 84100 ASSAY OF PHOSPHORUS: CPT | Performed by: STUDENT IN AN ORGANIZED HEALTH CARE EDUCATION/TRAINING PROGRAM

## 2025-03-09 PROCEDURE — 63600175 PHARM REV CODE 636 W HCPCS: Performed by: INTERNAL MEDICINE

## 2025-03-09 PROCEDURE — 85025 COMPLETE CBC W/AUTO DIFF WBC: CPT | Performed by: STUDENT IN AN ORGANIZED HEALTH CARE EDUCATION/TRAINING PROGRAM

## 2025-03-09 PROCEDURE — 83605 ASSAY OF LACTIC ACID: CPT | Performed by: STUDENT IN AN ORGANIZED HEALTH CARE EDUCATION/TRAINING PROGRAM

## 2025-03-09 PROCEDURE — P9021 RED BLOOD CELLS UNIT: HCPCS | Performed by: STUDENT IN AN ORGANIZED HEALTH CARE EDUCATION/TRAINING PROGRAM

## 2025-03-09 PROCEDURE — 36415 COLL VENOUS BLD VENIPUNCTURE: CPT | Performed by: STUDENT IN AN ORGANIZED HEALTH CARE EDUCATION/TRAINING PROGRAM

## 2025-03-09 PROCEDURE — 86920 COMPATIBILITY TEST SPIN: CPT | Performed by: STUDENT IN AN ORGANIZED HEALTH CARE EDUCATION/TRAINING PROGRAM

## 2025-03-09 PROCEDURE — 20600001 HC STEP DOWN PRIVATE ROOM

## 2025-03-09 PROCEDURE — 99223 1ST HOSP IP/OBS HIGH 75: CPT | Mod: GC,,, | Performed by: INTERNAL MEDICINE

## 2025-03-09 PROCEDURE — 85730 THROMBOPLASTIN TIME PARTIAL: CPT | Performed by: STUDENT IN AN ORGANIZED HEALTH CARE EDUCATION/TRAINING PROGRAM

## 2025-03-09 PROCEDURE — 93005 ELECTROCARDIOGRAM TRACING: CPT

## 2025-03-09 PROCEDURE — 83880 ASSAY OF NATRIURETIC PEPTIDE: CPT | Performed by: STUDENT IN AN ORGANIZED HEALTH CARE EDUCATION/TRAINING PROGRAM

## 2025-03-09 PROCEDURE — 83735 ASSAY OF MAGNESIUM: CPT | Performed by: STUDENT IN AN ORGANIZED HEALTH CARE EDUCATION/TRAINING PROGRAM

## 2025-03-09 PROCEDURE — 87641 MR-STAPH DNA AMP PROBE: CPT | Performed by: INTERNAL MEDICINE

## 2025-03-09 PROCEDURE — 80053 COMPREHEN METABOLIC PANEL: CPT | Performed by: STUDENT IN AN ORGANIZED HEALTH CARE EDUCATION/TRAINING PROGRAM

## 2025-03-09 PROCEDURE — 93010 ELECTROCARDIOGRAM REPORT: CPT | Mod: ,,, | Performed by: INTERNAL MEDICINE

## 2025-03-09 RX ORDER — CEFTRIAXONE 1 G/1
1 INJECTION, POWDER, FOR SOLUTION INTRAMUSCULAR; INTRAVENOUS
Status: DISCONTINUED | OUTPATIENT
Start: 2025-03-09 | End: 2025-03-09

## 2025-03-09 RX ADMIN — VANCOMYCIN HYDROCHLORIDE 1250 MG: 1.25 INJECTION, POWDER, LYOPHILIZED, FOR SOLUTION INTRAVENOUS at 09:03

## 2025-03-09 RX ADMIN — ATORVASTATIN CALCIUM 40 MG: 40 TABLET, FILM COATED ORAL at 09:03

## 2025-03-09 RX ADMIN — LORAZEPAM 0.5 MG: 0.5 TABLET ORAL at 09:03

## 2025-03-09 RX ADMIN — SODIUM CHLORIDE 500 ML: 9 INJECTION, SOLUTION INTRAVENOUS at 04:03

## 2025-03-09 RX ADMIN — PANTOPRAZOLE SODIUM 40 MG: 40 INJECTION, POWDER, FOR SOLUTION INTRAVENOUS at 09:03

## 2025-03-09 RX ADMIN — PIPERACILLIN SODIUM AND TAZOBACTAM SODIUM 4.5 G: 4; .5 INJECTION, POWDER, FOR SOLUTION INTRAVENOUS at 06:03

## 2025-03-09 RX ADMIN — VANCOMYCIN HYDROCHLORIDE 1000 MG: 1 INJECTION, POWDER, LYOPHILIZED, FOR SOLUTION INTRAVENOUS at 09:03

## 2025-03-09 RX ADMIN — LAMOTRIGINE 200 MG: 25 TABLET ORAL at 09:03

## 2025-03-09 RX ADMIN — PIPERACILLIN SODIUM AND TAZOBACTAM SODIUM 4.5 G: 4; .5 INJECTION, POWDER, FOR SOLUTION INTRAVENOUS at 10:03

## 2025-03-09 RX ADMIN — SODIUM CHLORIDE: 9 INJECTION, SOLUTION INTRAVENOUS at 01:03

## 2025-03-09 NOTE — SUBJECTIVE & OBJECTIVE
Past Medical History:   Diagnosis Date    Hyperlipidemia     Pyloric stenosis, congenital     s/p repair    Seizures        Past Surgical History:   Procedure Laterality Date    CORONARY ANGIOGRAPHY Right 2025    Procedure: ANGIOGRAM, CORONARY ARTERY;  Surgeon: Ari Latham III, MD;  Location: [facility] CATH LAB;  Service: Cardiology;  Laterality: Right;    CORONARY ARTERY BYPASS GRAFT (CABG) N/A 2025    Procedure: CORONARY ARTERY BYPASS GRAFT (CABG);  Surgeon: Flavio Leal MD;  Location: [facility] OR [address] [place] [address];  Service: Cardiovascular;  Laterality: N/A;  CABG x 2  LIMA TO LAD  SVG TO RCA    ENDOSCOPIC HARVEST OF VEIN Left 2025    Procedure: HARVEST-VEIN-ENDOVASCULAR;  Surgeon: Flavio Leal MD;  Location: [facility] OR [address] [place] [address];  Service: Cardiovascular;  Laterality: Left;  VEIN START-1043  VEIN OUT-1112  VEIN PREP START-1113  VEIN READY-1131    pyloric stenosis      TONSILLECTOMY         Review of patient's allergies indicates:   Allergen Reactions    Antihistamines - alkylamine Other (See Comments)     Heart race    Codeine Other (See Comments)     Heart race       Family History       Problem Relation (Age of Onset)    Alcohol abuse Sister, Brother    Arthritis Mother    Cancer Mother, Father    Crohn's disease Son    Diabetes Son    Heart disease Sister    Hypertension Mother, Sister, Brother    Kidney disease Son    Obesity Sister, Brother          Tobacco Use    Smoking status: Former     Current packs/day: 0.00     Types: Cigarettes     Quit date:      Years since quittin.2    Smokeless tobacco: Never   Substance and Sexual Activity    Alcohol use: No    Drug use: No    Sexual activity: Yes     Partners: Female      Review of Systems   Constitutional:  Positive for fatigue. Negative for fever.   Respiratory:  Negative for shortness of breath.    Cardiovascular:  Negative for chest pain and leg swelling.   Gastrointestinal:  Positive for nausea.        Hematemesis   Neurological:  Positive for  syncope, weakness and light-headedness. Negative for headaches.     Objective:     Vital Signs (Most Recent):  Temp: 97.6 °F (36.4 °C) (03/08/25 2114)  Pulse: 101 (03/08/25 2114)  Resp: 17 (03/08/25 2114)  BP: 102/68 (03/08/25 2114)  SpO2: 99 % (03/08/25 2114) Vital Signs (24h Range):  Temp:  [97.4 °F (36.3 °C)-97.8 °F (36.6 °C)] 97.6 °F (36.4 °C)  Pulse:  [] 101  Resp:  [16-21] 17  SpO2:  [99 %-100 %] 99 %  BP: (102-112)/(59-69) 102/68   Weight: 60.3 kg (133 lb)  Body mass index is 20.22 kg/m².      Intake/Output Summary (Last 24 hours) at 3/8/2025 2131  Last data filed at 3/8/2025 1814  Gross per 24 hour   Intake 500 ml   Output --   Net 500 ml          Physical Exam  Vitals and nursing note reviewed.   Constitutional:       General: He is not in acute distress.     Appearance: He is ill-appearing.   HENT:      Head: Normocephalic and atraumatic.      Mouth/Throat:      Mouth: Mucous membranes are dry.      Pharynx: Oropharynx is clear.   Eyes:      Pupils: Pupils are equal, round, and reactive to light.   Cardiovascular:      Rate and Rhythm: Regular rhythm. Tachycardia present.      Pulses: Normal pulses.   Pulmonary:      Effort: Pulmonary effort is normal. No respiratory distress.   Abdominal:      General: Bowel sounds are normal.      Palpations: Abdomen is soft.   Musculoskeletal:      Right lower leg: No edema.      Left lower leg: No edema.   Skin:     General: Skin is warm.      Capillary Refill: Capillary refill takes less than 2 seconds.      Coloration: Skin is pale.   Neurological:      General: No focal deficit present.      Mental Status: He is alert and oriented to person, place, and time. Mental status is at baseline.      GCS: GCS eye subscore is 4. GCS verbal subscore is 5. GCS motor subscore is 6.   Psychiatric:         Mood and Affect: Mood normal.            Vents:     Lines/Drains/Airways       Peripheral Intravenous Line  Duration                  Peripheral IV - Single Lumen  03/08/25 18 G Right Antecubital <1 day         Peripheral IV - Single Lumen 03/08/25 1932 18 G Anterior;Distal;Left Forearm <1 day                  Significant Labs:    CBC/Anemia Profile:  Recent Labs   Lab 03/08/25  1857 03/08/25  1913   WBC 21.68*  --    HGB 8.7*  --    HCT 27.7* 23*   *  --    MCV 94  --    RDW 14.7*  --         Chemistries:  Recent Labs   Lab 03/08/25 1929      K 3.7      CO2 26   BUN 36*   CREATININE 0.8   CALCIUM 8.0*   ALBUMIN 2.7*   PROT 5.1*   BILITOT 0.2   ALKPHOS 83   ALT 12   AST 27       All pertinent labs within the past 24 hours have been reviewed.    Significant Imaging: I have reviewed all pertinent imaging results/findings within the past 24 hours.

## 2025-03-09 NOTE — ASSESSMENT & PLAN NOTE
Patient's blood pressure range in the last 24 hours was: BP  Min: 91/59  Max: 112/59.The patient's inpatient anti-hypertensive regimen is listed below:  Current Antihypertensives       Plan  - With shock, will hold BP rx. Monitor and restart as indicated.

## 2025-03-09 NOTE — ASSESSMENT & PLAN NOTE
Patient with known CAD s/p CABG 02/21/2025. He was on DAPT but in setting of bleeding will hold and only continue statin at this time. Monitor for S/Sx of angina/ACS. Continue to monitor on telemetry.

## 2025-03-09 NOTE — ASSESSMENT & PLAN NOTE
Would suspect hypovolemic/hemorrhagic in origin with GIB but could have septic element with leukocytosis however this could also be reactive/hemocentration and no obvious source at this time and s/p a dose of CTX in the ED. Improved with both IVF and now receiving PRBC.    Plan  - Monitor on tele and continuous pulse ox.  - CBC q6 for now and monitor closely for any further hematemesis/melena/hematochezia or other stigmata of bleeding.  - Hold DVT ppx for now.  - Transfuse PRN and will also run mIVF after blood finishes.

## 2025-03-09 NOTE — ASSESSMENT & PLAN NOTE
Mildly reduced EF. Evolemic to hypovolemic appearing on exam.    Plan  - Order BNP and echo for evaluation.  - Hold BB and other meds due to hypotension at this time. Restart as able.

## 2025-03-09 NOTE — CONSULTS
[facility] Dept  Critical Care Medicine  Consult Note    Patient Name: Ye Partida  MRN: [id number]  Admission Date: 3/8/2025  Hospital Length of Stay: 0 days  Code Status: Full Code  Attending Physician: Jorge Arnold,*   Primary Care Provider: Cameron Tran MD   Principal Problem: Gastrointestinal bleed    Inpatient consult to Critical Care Medicine  Consult performed by: Shayan Nixon NP  Consult ordered by: Kasey Barfield MD        Subjective:     HPI:  Mr. Partida is a 69 year old male with pyloric stenosis s/p surgical intervention and CAD s/p CABG 2 weeks ago who [place] [facility] [place]em[place]. Patient reports progressive lethargy, weakness, and poor PO intake x 3 days. Reports he recently stopped his blood pressure medications last week as his BP were consistently 70s/40s. Today, he attempted to present to the emergency department and was so weak that he attempted to crawl on the floor to the car, but subsequently had syncope and woke up with coffee ground emesis surrounding him. EMS reports emesis around patient with head abrasion. EMS reports SBP 60s and he was given 600 mL of IVF with improvement in blood pressure.     In our emergency department, Hgb 8.7 and HDS. CTH completed given syncope with abrasion. He was given protonix, zofran, and ceftriaxone. 1u PRBC ordered.      Hospital/ICU Course:  No notes on file    Past Medical History:   Diagnosis Date    Hyperlipidemia     Pyloric stenosis, congenital     s/p repair    Seizures        Past Surgical History:   Procedure Laterality Date    CORONARY ANGIOGRAPHY Right 2/19/2025    Procedure: ANGIOGRAM, CORONARY ARTERY;  Surgeon: Ari Latham III, MD;  Location: [facility] CATH LAB;  Service: Cardiology;  Laterality: Right;    CORONARY ARTERY BYPASS GRAFT (CABG) N/A 2/21/2025    Procedure: CORONARY ARTERY BYPASS GRAFT (CABG);  Surgeon: Flavio Leal MD;  Location: [facility] OR [address] [place] [address];  Service: Cardiovascular;   Laterality: N/A;  CABG x 2  LIMA TO LAD  SVG TO RCA    ENDOSCOPIC HARVEST OF VEIN Left 2025    Procedure: HARVEST-VEIN-ENDOVASCULAR;  Surgeon: Flavio Leal MD;  Location: [facility] OR [address] [place] [address];  Service: Cardiovascular;  Laterality: Left;  VEIN START-1043  VEIN OUT-1112  VEIN PREP START-1113  VEIN READY-1131    pyloric stenosis      TONSILLECTOMY         Review of patient's allergies indicates:   Allergen Reactions    Antihistamines - alkylamine Other (See Comments)     Heart race    Codeine Other (See Comments)     Heart race       Family History       Problem Relation (Age of Onset)    Alcohol abuse Sister, Brother    Arthritis Mother    Cancer Mother, Father    Crohn's disease Son    Diabetes Son    Heart disease Sister    Hypertension Mother, Sister, Brother    Kidney disease Son    Obesity Sister, Brother          Tobacco Use    Smoking status: Former     Current packs/day: 0.00     Types: Cigarettes     Quit date:      Years since quittin.2    Smokeless tobacco: Never   Substance and Sexual Activity    Alcohol use: No    Drug use: No    Sexual activity: Yes     Partners: Female      Review of Systems   Constitutional:  Positive for fatigue. Negative for fever.   Respiratory:  Negative for shortness of breath.    Cardiovascular:  Negative for chest pain and leg swelling.   Gastrointestinal:  Positive for nausea.        Hematemesis   Neurological:  Positive for syncope, weakness and light-headedness. Negative for headaches.     Objective:     Vital Signs (Most Recent):  Temp: 97.6 °F (36.4 °C) (25)  Pulse: 101 (25)  Resp: 17 (25)  BP: 102/68 (25)  SpO2: 99 % (25) Vital Signs (24h Range):  Temp:  [97.4 °F (36.3 °C)-97.8 °F (36.6 °C)] 97.6 °F (36.4 °C)  Pulse:  [] 101  Resp:  [16-21] 17  SpO2:  [99 %-100 %] 99 %  BP: (102-112)/(59-69) 102/68   Weight: 60.3 kg (133 lb)  Body mass index is 20.22 kg/m².      Intake/Output Summary (Last 24 hours)  at 3/8/2025 2131  Last data filed at 3/8/2025 1814  Gross per 24 hour   Intake 500 ml   Output --   Net 500 ml          Physical Exam  Vitals and nursing note reviewed.   Constitutional:       General: He is not in acute distress.     Appearance: He is ill-appearing.   HENT:      Head: Normocephalic and atraumatic.      Mouth/Throat:      Mouth: Mucous membranes are dry.      Pharynx: Oropharynx is clear.   Eyes:      Pupils: Pupils are equal, round, and reactive to light.   Cardiovascular:      Rate and Rhythm: Regular rhythm. Tachycardia present.      Pulses: Normal pulses.   Pulmonary:      Effort: Pulmonary effort is normal. No respiratory distress.   Abdominal:      General: Bowel sounds are normal.      Palpations: Abdomen is soft.   Musculoskeletal:      Right lower leg: No edema.      Left lower leg: No edema.   Skin:     General: Skin is warm.      Capillary Refill: Capillary refill takes less than 2 seconds.      Coloration: Skin is pale.   Neurological:      General: No focal deficit present.      Mental Status: He is alert and oriented to person, place, and time. Mental status is at baseline.      GCS: GCS eye subscore is 4. GCS verbal subscore is 5. GCS motor subscore is 6.   Psychiatric:         Mood and Affect: Mood normal.            Vents:     Lines/Drains/Airways       Peripheral Intravenous Line  Duration                  Peripheral IV - Single Lumen 03/08/25 18 G Right Antecubital <1 day         Peripheral IV - Single Lumen 03/08/25 1932 18 G Anterior;Distal;Left Forearm <1 day                  Significant Labs:    CBC/Anemia Profile:  Recent Labs   Lab 03/08/25  1857 03/08/25  1913   WBC 21.68*  --    HGB 8.7*  --    HCT 27.7* 23*   *  --    MCV 94  --    RDW 14.7*  --         Chemistries:  Recent Labs   Lab 03/08/25  1929      K 3.7      CO2 26   BUN 36*   CREATININE 0.8   CALCIUM 8.0*   ALBUMIN 2.7*   PROT 5.1*   BILITOT 0.2   ALKPHOS 83   ALT 12   AST 27       All pertinent  "labs within the past 24 hours have been reviewed.    Significant Imaging: I have reviewed all pertinent imaging results/findings within the past 24 hours.    ABG  No results for input(s): "PH", "PO2", "PCO2", "HCO3", "BE" in the last 168 hours.  Assessment/Plan:     GI  * Gastrointestinal bleed  69 year old male with CAD 2 week s/p CABG on DAPT who presented to [facility] [place] via EMS after hematemesis and syncope.     Recent Labs   Lab 03/08/25  1857 03/08/25  1913   WBC 21.68*  --    RBC 2.96*  --    HGB 8.7*  --    HCT 27.7* 23*   *  --    MCV 94  --    MCH 29.4  --    MCHC 31.4*  --      - GI consulted   - 1u PRBC ordered   - Maintain type and screen   - Maintain large bore IV access   - Protonix 80 mg x 1 followed by 40 mg BID   - Trend CBC   - Transfuse for hgb < 7   - Correct any coagulopathy w FFP / PLT   - Hold ASA / Plavix   - MAP > 65   - Nausea management   - NPO         Critical Care Daily Checklist:    A: Awake: RASS Goal/Actual Goal:    Actual:     B: Spontaneous Breathing Trial Performed?     C: SAT & SBT Coordinated?  N/A                      D: Delirium: CAM-ICU     E: Early Mobility Performed? No   F: Feeding Goal:    Status:     Current Diet Order   Procedures    Diet NPO Except for: Sips with Medication     Except for::   Sips with Medication      AS: Analgesia/Sedation PRN    T: Thromboembolic Prophylaxis Hold given GIB   H: HOB > 300 Yes   U: Stress Ulcer Prophylaxis (if needed) Protonix    G: Glucose Control 140-180   B: Bowel Function     I: Indwelling Catheter (Lines & Snell) Necessity Maintain large bore access    D: De-escalation of Antimicrobials/Pharmacotherapies     Plan for the day/ETD 1 u PRBC. Trend CBC. GI consulted.     Code Status:  Family/Goals of Care: Full Code  At bedside      Critical Care Time: 45 minutes  Critical secondary to Patient has a condition that poses threat to life and bodily function: gastrointestinal bleeding     Plan of care discussed with attending physician. " Attestation to follow.      Critical care was time spent personally by me on the following activities: development of treatment plan with patient or surrogate and bedside caregivers, discussions with consultants, evaluation of patient's response to treatment, examination of patient, ordering and performing treatments and interventions, ordering and review of laboratory studies, ordering and review of radiographic studies, pulse oximetry, re-evaluation of patient's condition. This critical care time did not overlap with that of any other provider or involve time for any procedures.    Thank you for your consult. I will sign off. Please contact us if you have any additional questions.     Shayan Nixon NP  Critical Care Medicine  [facility]

## 2025-03-09 NOTE — H&P
[facility] - Emergency Dept  [facility]  History & Physical    Patient Name: Ye Partida  MRN: [id number]  Patient Class: IP- Inpatient  Admission Date: 3/8/2025  Attending Physician: Jorge Arnold,*   Primary Care Provider: Cameron Tran MD         Patient information was obtained from patient, spouse/SO, past medical records, and ER records.     Subjective:     Principal Problem:Gastrointestinal bleed    Chief Complaint:   Chief Complaint   Patient presents with    Melena     Arrives via EMS from home. + GI bleed with EMS, pale, lethargic, diaphoretic. 2 weeks ago received coronary artery bypass. + blood thinners. Symptoms started 1 hour ago. + fall with head trauma, LOC, emesis, and melena.         HPI: Ye Partida is a 69 y.o. male prior smoker (quit 40s) with CAD s/p recent CABG 02/21/2025 on ASA/plavix, HFreF (40-50% 02/2025) HTN, HLD, pyloric stenosis as a child, and epilepsy who presents for worsening fatigue and weakness over the last ~1x week. Pt and wife report he began to have low BP ~1x week ago (70/40s at home) and he felt very fatigued with generalized weakness. They discussed with CTS who recommended stopping lasix and metoprolol. Pt and wife report he felt better and had improved BP after that and even was able to exercise (walking) on 3/5 and 3/6 then felt fatigued the next day which he thought was because he overdid it with the exercise. On the day of presentation, pt's fatigue and generalized weakness had persisted and worsened to the point where the patient had very decreased appetite and felt too weak to get up by himself and at that time, pt and wife decided that he needed to go to the ED for evaluation. They also noted he had become diaphoretic and fatigued with any sort of exertion but denied any chest pain, dyspnea, palpitations, LE edema. They tried to help him get up and walk to leave but pt was very weak and had to rest on the ground as his legs  started to go out from under him and he began to have dark/coffee ground emesis so wife placed him on his side and called EMS. Pt reports feeling like he passed out briefly while he was down and woke up to hear his wife talking on the phone about him. Per EMS report, patient found hypotensive with SBP in the 60s. They noted that there was melena and coffee-ground emesis all over the home however pt and wife did not note any melena/hematochezia at that time or before. EMS started gave him about 600 cc of normal saline with improvement in BP.  Pt discussed with MICU and CTS in ED who recommended 1u blood transfusion and admission to . Pt denies heavy alcohol use currently or in the past including any issues with withdrawal.    Past Medical History:   Diagnosis Date    Hyperlipidemia     Pyloric stenosis, congenital     s/p repair    Seizures        Past Surgical History:   Procedure Laterality Date    CORONARY ANGIOGRAPHY Right 2/19/2025    Procedure: ANGIOGRAM, CORONARY ARTERY;  Surgeon: Ari Latham III, MD;  Location: [facility] CATH LAB;  Service: Cardiology;  Laterality: Right;    CORONARY ARTERY BYPASS GRAFT (CABG) N/A 2/21/2025    Procedure: CORONARY ARTERY BYPASS GRAFT (CABG);  Surgeon: Flavio Leal MD;  Location: [facility] OR [address] [place] [address];  Service: Cardiovascular;  Laterality: N/A;  CABG x 2  LIMA TO LAD  SVG TO RCA    ENDOSCOPIC HARVEST OF VEIN Left 2/21/2025    Procedure: HARVEST-VEIN-ENDOVASCULAR;  Surgeon: Flavio Leal MD;  Location: [facility] OR [address] [place] [address];  Service: Cardiovascular;  Laterality: Left;  VEIN START-1043  VEIN OUT-1112  VEIN PREP START-1113  VEIN READY-1131    pyloric stenosis      TONSILLECTOMY         Review of patient's allergies indicates:   Allergen Reactions    Antihistamines - alkylamine Other (See Comments)     Heart race    Codeine Other (See Comments)     Heart race       No current facility-administered medications on file prior to encounter.     Current Outpatient Medications on File Prior  to Encounter   Medication Sig    acetaminophen (TYLENOL) 500 MG tablet Take 2 tablets (1,000 mg total) by mouth every 6 (six) hours as needed for Pain.    ascorbic acid (VITAMIN C) 1000 MG tablet Take 1,000 mg by mouth once daily.    aspirin 81 MG Chew Chew and swallow 1 tablet (81 mg total) by mouth once daily.    atorvastatin (LIPITOR) 40 MG tablet Take 1 tablet (40 mg total) by mouth once daily.    clopidogreL (PLAVIX) 75 mg tablet Take 1 tablet (75 mg total) by mouth once daily.    cyanocobalamin (VITAMIN B-12) 100 MCG tablet Take 100 mcg by mouth once daily.    famotidine (PEPCID) 20 MG tablet Take 1 tablet (20 mg total) by mouth 2 (two) times daily.    furosemide (LASIX) 20 MG tablet Take 1 tablet (20 mg total) by mouth 2 (two) times a day for 7 days, THEN 1 tablet (20 mg total) once daily.    lamotrigine (LAMICTAL) 200 MG tablet Take 1 tablet by mouth  daily    lorazepam (ATIVAN) 0.5 MG tablet Take 1 tablet (0.5 mg total) by mouth 2 (two) times daily. Take 1/2 tablet (0.25mg) in am, take 1 tablet (0.5mg) in pm (Patient taking differently: Take 0.5 mg by mouth 2 (two) times daily.)    melatonin (MELATIN) 3 mg tablet Take 2 tablets (6 mg total) by mouth nightly as needed for Insomnia.    metoprolol tartrate (LOPRESSOR) 25 MG tablet Take 1 tablet (25 mg total) by mouth 2 (two) times daily.    potassium chloride SA (K-DUR,KLOR-CON) 20 MEQ tablet Take 1 tablet (20 mEq total) by mouth 2 (two) times a day for 7 days, THEN 1 tablet (20 mEq total) once daily.    tamsulosin (FLOMAX) 0.4 mg Cap Take 1 capsule (0.4 mg total) by mouth once daily.     Family History       Problem Relation (Age of Onset)    Alcohol abuse Sister, Brother    Arthritis Mother    Cancer Mother, Father    Crohn's disease Son    Diabetes Son    Heart disease Sister    Hypertension Mother, Sister, Brother    Kidney disease Son    Obesity Sister, Brother          Tobacco Use    Smoking status: Former     Current packs/day: 0.00     Types:  Cigarettes     Quit date:      Years since quittin.2    Smokeless tobacco: Never   Substance and Sexual Activity    Alcohol use: No    Drug use: No    Sexual activity: Yes     Partners: Female     Review of Systems   Constitutional:  Positive for appetite change, diaphoresis and fatigue. Negative for fever.   HENT:  Negative for congestion, rhinorrhea and sore throat.    Eyes:  Negative for visual disturbance.   Respiratory:  Negative for cough and shortness of breath.    Cardiovascular:  Negative for chest pain, palpitations and leg swelling.   Gastrointestinal:  Positive for nausea and vomiting. Negative for abdominal pain.   Genitourinary:  Negative for decreased urine volume, dysuria and hematuria.   Skin:  Negative for rash.   Neurological:  Positive for dizziness, syncope, weakness (Generalized weakness) and light-headedness.   Hematological:  Does not bruise/bleed easily.     Objective:     Vital Signs (Most Recent):  Temp: 98.3 °F (36.8 °C) (25)  Pulse: 104 (25)  Resp: 16 (25)  BP: 96/62 (25)  SpO2: 100 % (25) Vital Signs (24h Range):  Temp:  [97.4 °F (36.3 °C)-98.3 °F (36.8 °C)] 98.3 °F (36.8 °C)  Pulse:  [] 104  Resp:  [16-21] 16  SpO2:  [99 %-100 %] 100 %  BP: ()/(59-69) 96/62     Weight: 60.3 kg (133 lb)  Body mass index is 20.22 kg/m².     Physical Exam  Vitals reviewed.   Constitutional:       General: He is not in acute distress.     Appearance: He is not diaphoretic.   HENT:      Head: Normocephalic and atraumatic.      Mouth/Throat:      Mouth: Mucous membranes are dry.   Eyes:      General:         Right eye: No discharge.         Left eye: No discharge.      Conjunctiva/sclera: Conjunctivae normal.      Pupils: Pupils are equal, round, and reactive to light.   Cardiovascular:      Rate and Rhythm: Regular rhythm. Tachycardia present.      Heart sounds: Normal heart sounds.   Pulmonary:      Effort: Pulmonary effort is  normal. No respiratory distress.      Breath sounds: Normal breath sounds. No wheezing or rales.   Abdominal:      General: Bowel sounds are normal. There is no distension.      Palpations: Abdomen is soft.      Tenderness: There is no abdominal tenderness. There is no guarding or rebound.   Musculoskeletal:      Cervical back: Normal range of motion and neck supple.      Right lower leg: No edema.      Left lower leg: No edema.   Skin:     General: Skin is warm and dry.      Coloration: Skin is pale.   Neurological:      General: No focal deficit present.      Mental Status: He is alert and oriented to person, place, and time.      Cranial Nerves: Cranial nerves 2-12 are intact. No dysarthria or facial asymmetry.      Motor: No weakness, tremor or abnormal muscle tone.   Psychiatric:         Mood and Affect: Mood normal.         Behavior: Behavior normal.            CRANIAL NERVES     CN III, IV, VI   Pupils are equal, round, and reactive to light.       Significant Labs: All pertinent labs within the past 24 hours have been reviewed.  Recent Lab Results         03/08/25 1929 03/08/25 1913 03/08/25  1857        Unit Blood Type Code     6200  [P]       Unit Expiration     [id number]  [P]       Unit Blood Type     A POS  [P]       Albumin 2.7           ALP 83           ALT 12           Anion Gap 9           AST 27           Baso #     0.08       Basophil %     0.4       BILIRUBIN TOTAL 0.2  Comment: For infants and newborns, interpretation of results should be based  on gestational age, weight and in agreement with clinical  observations.    Premature Infant recommended reference ranges:  Up to 24 hours.............<8.0 mg/dL  Up to 48 hours............<12.0 mg/dL  3-5 days..................<15.0 mg/dL  6-29 days.................<15.0 mg/dL             BUN 36           Calcium 8.0           Chloride 109           CO2 26           CODING SYSTEM     [id number]  [P]       Creatinine 0.8           Crossmatch  Interpretation     Compatible  [P]       Differential Method     Automated       DISPENSE STATUS     ISSUED  [P]       eGFR >60.0           Eos #     0.1       Eos %     0.2       Glucose 126           Gran # (ANC)     19.2       Gran %     88.4       Group & Rh     A POS       Hematocrit     27.7       Hemoglobin     8.7       Immature Grans (Abs)     0.20  Comment: Mild elevation in immature granulocytes is non specific and   can be seen in a variety of conditions including stress response,   acute inflammation, trauma and pregnancy. Correlation with other   laboratory and clinical findings is essential.         Immature Granulocytes     0.9       INDIRECT MATTHEW     NEG       Lactic Acid Level     1.8  Comment: Falsely low lactic acid results can be found in samples   containing >=13.0 mg/dL total bilirubin and/or >=3.5 mg/dL   direct bilirubin.         Lipase 23           Lymph #     1.2       Lymph %     5.7       MCH     29.4       MCHC     31.4       MCV     94       Mono #     1.0       Mono %     4.4       MPV     9.4       nRBC     0       Platelet Count     786       POC BUN   34         POC Chloride   104         POC Creatinine   1.0         POC Glucose   126         POC Hematocrit   23         POC Ionized Calcium   1.15         POC Potassium   3.7         POC Sodium   143         POC TCO2 (MEASURED)   26         Potassium 3.7           Product Code     [id number]  [P]       PROTEIN TOTAL 5.1           RBC     2.96       RDW     14.7       Sample   ADAM         Sodium 144           Specimen Outdate     03/11/2025 23:59       UNIT NUMBER     [id number]  [P]       WBC     21.68                [P] - Preliminary Result               Significant Imaging: I have reviewed all pertinent imaging results/findings within the past 24 hours.  Imaging Results              X-Ray Chest AP Portable (In process)                      CT Head Without Contrast (Final result)  Result time 03/08/25 21:42:21      Final result  by Ayo Pearson MD (03/08/25 21:42:21)                   Impression:      No acute intracranial hemorrhage or major vascular distribution infarct.    Small focal area of encephalomalacia in the right internal capsule, most likely lacunar type infarct and technically age indeterminate given no priors for comparison.  If there is concern for acute infarct in this area, MRI can be performed if patient compatible.    Electronically signed by resident: Ricardo Cortez  Date:    03/08/2025  Time:    20:43    Electronically signed by: Ayo Pearson  Date:    03/08/2025  Time:    21:42               Narrative:    EXAMINATION:  CT HEAD WITHOUT CONTRAST    CLINICAL HISTORY:  Head trauma, minor (Age >= 65y);    TECHNIQUE:  Low dose axial CT images obtained throughout the head without the use of intravenous contrast.  Axial, sagittal and coronal reconstructions were performed.    COMPARISON:  No relevant priors.    FINDINGS:  Intracranial compartment:    Mild generalized cerebral volume loss with compensatory enlargement of ventricles and sulci.  No evidence of hydrocephalus..    Mild patchy hypoattenuation of the supratentorial white matter, nonspecific most likely represent chronic small vessel ischemic change.  More focal areas of encephalomalacia in the right internal capsule, technically age indeterminate lacunar type infarct.  No parenchymal  hemorrhage, edema, mass effect or major vascular distribution infarct.    No extra-axial blood or fluid collections.    Skull/extracranial contents (limited evaluation):    No displaced calvarial fracture.    The mastoid air cells and visualized paranasal sinuses are essentially clear.     imaging: Atomic detail poorly visualized; C2-C3 grade 1 anterolisthesis is suggested.                                      Assessment/Plan:     * Gastrointestinal bleed  Patient's hemorrhage is due to gastrointestinal bleed while on DAPT after CABG. Patients most recent Hgb, Hct,  platelets, and INR are listed below.  Recent Labs     03/08/25 1857 03/08/25 1913   HGB 8.7*  --    HCT 27.7* 23*   *  --      Plan  - Will trend hemoglobin/hematocrit Every 6 hours  - Will monitor and correct any coagulation defects  - Will transfuse if Hgb is <7g/dl (<8g/dl in cases of active ACS) or if patient has rapid bleeding leading to hemodynamic instability  - GI consulted.    Shock  Would suspect hypovolemic/hemorrhagic in origin with GIB but could have septic element with leukocytosis however this could also be reactive/hemocentration and no obvious source at this time and s/p a dose of CTX in the ED. Improved with both IVF and now receiving PRBC.    Plan  - Monitor on tele and continuous pulse ox.  - CBC q6 for now and monitor closely for any further hematemesis/melena/hematochezia or other stigmata of bleeding.  - Hold DVT ppx for now.  - Transfuse PRN and will also run mIVF after blood finishes.    CAD (coronary artery disease)  Patient with known CAD s/p CABG 02/21/2025. He was on DAPT but in setting of bleeding will hold and only continue statin at this time. Monitor for S/Sx of angina/ACS. Continue to monitor on telemetry.     Abnormal brain CT  Noted small focal area of encephalomalacia in the right internal capsule, per radiology suspected possible lacunar type infarct but technically age indeterminate given no priors for comparison. Exam and hx in addition to improvement with tx are reassuring against acute CVA.    Plan  - Consider follow up non-emergent MRI.    Leukocytosis  Possibly reactive in setting of stress, no obvious source although could be GI in nature.    Plan  - Ordered UA and viral testing.  - Follow up CXR.  - Will continue CTX for now.    Symptomatic anemia  Anemia is likely due to acute blood loss which was from GIB . Most recent hemoglobin and hematocrit are listed below.  Recent Labs     03/08/25 1857 03/08/25 1913   HGB 8.7*  --    HCT 27.7* 23*     Plan  - Monitor  serial CBC: Every 6 hours  - Transfuse PRBC if patient becomes hemodynamically unstable, symptomatic or H/H drops below 7/21.  - Patient has received 1 units of PRBCs on 03/08/2025    Syncope  Difficult to characterize event as pt reported while he was already lying on the floor while having hematemesis/reported possible melena so hard to characterize exactly as orthostatic but with his shock could contribute. Pt denied chest pain, palpitations, dyspnea and his fatigue/weakness/decreased exercise tolerance appears likely related to bleeding and his exam does not suggest worsening CAD/HF. EKG without acute arrhthymias or ischemic changes.    Plan  - Monitor on tele and for ACS sxs.  - Order BNP and echo.  - Treatment for possible contributing etiologies as elsewhere described.    HFrEF (heart failure with reduced ejection fraction)  Mildly reduced EF. Evolemic to hypovolemic appearing on exam.    Plan  - Order BNP and echo for evaluation.  - Hold BB and other meds due to hypotension at this time. Restart as able.      Seizure disorder  Plan  - Continue home lamictal and ativan.    HTN (hypertension)  Patient's blood pressure range in the last 24 hours was: BP  Min: 91/59  Max: 112/59.The patient's inpatient anti-hypertensive regimen is listed below:  Current Antihypertensives       Plan  - With shock, will hold BP rx. Monitor and restart as indicated.    HLD (hyperlipidemia)  Plan  - Continue statin and hold ASA/plavix due to bleed.      VTE Risk Mitigation (From admission, onward)           Ordered     IP VTE HIGH RISK PATIENT  Once         03/08/25 2130     Place sequential compression device  Until discontinued         03/08/25 2130                            Lary Monk DO  Department of Hospital Medicine  [facility] - Emergency Dept

## 2025-03-09 NOTE — ASSESSMENT & PLAN NOTE
Possibly reactive in setting of stress, no obvious source although could be GI in nature.    Plan  - Ordered UA and viral testing.  - Follow up CXR.  - Will continue CTX for now.

## 2025-03-09 NOTE — ASSESSMENT & PLAN NOTE
Anemia is likely due to acute blood loss which was from GIB . Most recent hemoglobin and hematocrit are listed below.  Recent Labs     03/08/25  1857 03/08/25  1913   HGB 8.7*  --    HCT 27.7* 23*     Plan  - Monitor serial CBC: Every 6 hours  - Transfuse PRBC if patient becomes hemodynamically unstable, symptomatic or H/H drops below 7/21.  - Patient has received 1 units of PRBCs on 03/08/2025

## 2025-03-09 NOTE — CARE UPDATE
"RAPID RESPONSE NURSE CHART REVIEW        Chart Reviewed: 03/09/2025, 4:47 AM    MRN: [id number]  Bed: 44080/62310 A    Dx: Gastrointestinal bleed    Ye Partida has a past medical history of Hyperlipidemia, Pyloric stenosis, congenital, and Seizures.    Last VS: BP (!) 90/58   Pulse (!) 125   Temp 98.2 °F (36.8 °C) (Oral)   Resp 17   Ht 5' 8" (1.727 m)   Wt 60.3 kg (133 lb)   SpO2 100%   BMI 20.22 kg/m²     24H Vital Sign Range:  Temp:  [97.4 °F (36.3 °C)-98.3 °F (36.8 °C)]   Pulse:  []   Resp:  [16-21]   BP: ()/(58-69)   SpO2:  [98 %-100 %]     Level of Consciousness (AVPU): alert    Recent Labs     03/08/25  1857 03/08/25  1913   WBC 21.68*  --    HGB 8.7*  --    HCT 27.7* 23*   *  --        Recent Labs     03/08/25  1929      K 3.7      CO2 26   BUN 36*   CREATININE 0.8   *      OXYGEN:  Flow (L/min) (Oxygen Therapy): 4        MEWS score: 2    Contacted by Dr. Monk in regards to hypotensive event. BP currently 90/58 MAP 69, HR 99, Spo2 100% on 4L. Additional 500 cc NS bolus orders with continuous 100cc/hr NS to follow. EKG ordered. Awaiting repeat CBC.   No additional concerns verbalized at this time. Instructed to call [phone] for further concerns or assistance.    Sheryl Gomez RN       "

## 2025-03-09 NOTE — ASSESSMENT & PLAN NOTE
69 year old male with CAD 2 week s/p CABG on DAPT who [place] [facility] [place] hematemesis and syncope.     Recent Labs   Lab 03/08/25  1857 03/08/25  1913   WBC 21.68*  --    RBC 2.96*  --    HGB 8.7*  --    HCT 27.7* 23*   *  --    MCV 94  --    MCH 29.4  --    MCHC 31.4*  --      - GI consulted   - 1u PRBC ordered   - Maintain type and screen   - Maintain large bore IV access   - Protonix 80 mg x 1 followed by 40 mg BID   - Trend CBC   - Transfuse for hgb < 7   - Correct any coagulopathy w FFP / PLT   - Hold ASA / Plavix   - MAP > 65   - Nausea management   - NPO

## 2025-03-09 NOTE — PROGRESS NOTES
Hospital Medicine  Progress note    Team: [facility] HOSP MED D Ivory Thorpe MD  Admit Date: 3/8/2025  Code status: Full Code    Principal Problem:  Gastrointestinal bleed    Interval hx:  Feeling improved.     PEx  Temp:  [97.4 °F (36.3 °C)-98.9 °F (37.2 °C)]   Pulse:  []   Resp:  [16-21]   BP: ()/(57-69)   SpO2:  [98 %-100 %]     Intake/Output Summary (Last 24 hours) at 3/9/2025 1607  Last data filed at 3/9/2025 1000  Gross per 24 hour   Intake 2223.42 ml   Output 700 ml   Net 1523.42 ml       General Appearance: no acute distress, WD, WN  Heart: regular rate and rhythm, no heave  Respiratory: Normal respiratory effort, symmetric excursion, bilateral vesicular breath sounds   Abdomen: Soft, non-tender; bowel sounds active  Skin: intact, no rash, no ulcers  Neurologic:  No focal numbness or weakness  Mental status: Alert, oriented x 4, affect appropriate    Recent Labs   Lab 03/08/25  1857 03/08/25 1913 03/09/25  0441 03/09/25  1252   WBC 21.68*  --  18.84* 19.48*   HGB 8.7*  --  7.7* 7.6*   HCT 27.7* 23* 24.2* 24.1*   *  --  503* 398     Recent Labs   Lab 03/08/25 1929 03/09/25  0441    142   K 3.7 3.5    110   CO2 26 21*   BUN 36* 42*   CREATININE 0.8 0.8   * 129*   CALCIUM 8.0* 7.9*   MG  --  2.0   PHOS  --  3.8   LIPASE 23  --      Recent Labs   Lab 03/08/25 1929 03/09/25  0441   ALKPHOS 83 75   ALT 12 10   AST 27 17   ALBUMIN 2.7* 2.6*   PROT 5.1* 4.6*   BILITOT 0.2 0.7   INR  --  1.1        Recent Labs   Lab 03/09/25  1216   POCTGLUCOSE 128*       Scheduled Meds:   atorvastatin  40 mg Oral Daily    lamoTRIgine  200 mg Oral Daily    LORazepam  0.5 mg Oral BID    pantoprazole  40 mg Intravenous BID    piperacillin-tazobactam (Zosyn) IV (PEDS and ADULTS) (extended infusion is not appropriate)  4.5 g Intravenous Q8H    vancomycin (VANCOCIN) IV (PEDS and ADULTS)  15 mg/kg Intravenous Q12H     Continuous Infusions:  As Needed:    Current Facility-Administered Medications:     0.9%   NaCl infusion (for blood administration), , Intravenous, Q24H PRN    acetaminophen, 650 mg, Oral, Q4H PRN    dextrose 50%, 12.5 g, Intravenous, PRN    dextrose 50%, 25 g, Intravenous, PRN    glucagon (human recombinant), 1 mg, Intramuscular, PRN    glucose, 16 g, Oral, PRN    glucose, 24 g, Oral, PRN    insulin aspart U-100, 0-5 Units, Subcutaneous, Q6H PRN    melatonin, 6 mg, Oral, Nightly PRN    naloxone, 0.2 mg, Intravenous, PRN    ondansetron, 8 mg, Oral, Q8H PRN    polyethylene glycol, 17 g, Oral, Daily PRN    prochlorperazine, 10 mg, Oral, Q6H PRN    senna-docusate 8.6-50 mg, 1 tablet, Oral, BID PRN    sodium chloride 0.9%, 10 mL, Intravenous, Q12H PRN    Pharmacy to dose Vancomycin consult, , , Once **AND** vancomycin - pharmacy to dose, , Intravenous, pharmacy to manage frequency    Assessment and Plan  / Problems managed today    * Gastrointestinal bleed  Patient's hemorrhage is due to gastrointestinal bleed while on DAPT after CABG. Patients most recent Hgb, Hct, platelets, and INR are listed below.  Recent Labs     03/08/25  1857 03/08/25  1913   HGB 8.7*  --    HCT 27.7* 23*   *  --      Plan  - Will trend hemoglobin/hematocrit Every 6 hours  - Will monitor and correct any coagulation defects  - Will transfuse if Hgb is <7g/dl (<8g/dl in cases of active ACS) or if patient has rapid bleeding leading to hemodynamic instability  - GI consulted.    Abnormal brain CT  Noted small focal area of encephalomalacia in the right internal capsule, per radiology suspected possible lacunar type infarct but technically age indeterminate given no priors for comparison. Exam and hx in addition to improvement with tx are reassuring against acute CVA.    Plan  - Consider follow up non-emergent MRI.    Leukocytosis  Possibly reactive in setting of stress, no obvious source although could be GI in nature.    Plan  - Ordered UA and viral testing.  - Follow up CXR.  - Will continue CTX for now.    Symptomatic  anemia  Anemia is likely due to acute blood loss which was from GIB . Most recent hemoglobin and hematocrit are listed below.  Recent Labs     03/08/25 1857 03/08/25  1913   HGB 8.7*  --    HCT 27.7* 23*     Plan  - Monitor serial CBC: Every 6 hours  - Transfuse PRBC if patient becomes hemodynamically unstable, symptomatic or H/H drops below 7/21.  - Patient has received 1 units of PRBCs on 03/08/2025    Syncope  Difficult to characterize event as pt reported while he was already lying on the floor while having hematemesis/reported possible melena so hard to characterize exactly as orthostatic but with his shock could contribute. Pt denied chest pain, palpitations, dyspnea and his fatigue/weakness/decreased exercise tolerance appears likely related to bleeding and his exam does not suggest worsening CAD/HF. EKG without acute arrhthymias or ischemic changes.    Plan  - Monitor on tele and for ACS sxs.  - Order BNP and echo.  - Treatment for possible contributing etiologies as elsewhere described.    HFrEF (heart failure with reduced ejection fraction)  Mildly reduced EF. Evolemic to hypovolemic appearing on exam.    Plan  - Order BNP and echo for evaluation.  - Hold BB and other meds due to hypotension at this time. Restart as able.      Seizure disorder  Plan  - Continue home lamictal and ativan.    Shock  Would suspect hypovolemic/hemorrhagic in origin with GIB but could have septic element with leukocytosis however this could also be reactive/hemocentration and no obvious source at this time and s/p a dose of CTX in the ED. Improved with both IVF and now receiving PRBC.    Plan  - Monitor on tele and continuous pulse ox.  - CBC q6 for now and monitor closely for any further hematemesis/melena/hematochezia or other stigmata of bleeding.  - Hold DVT ppx for now.  - Transfuse PRN and will also run mIVF after blood finishes.    CAD (coronary artery disease)  Patient with known CAD s/p CABG 02/21/2025. He was on DAPT  but in setting of bleeding will hold and only continue statin at this time. Monitor for S/Sx of angina/ACS. Continue to monitor on telemetry.     HTN (hypertension)  Patient's blood pressure range in the last 24 hours was: BP  Min: 91/59  Max: 112/59.The patient's inpatient anti-hypertensive regimen is listed below:  Current Antihypertensives       Plan  - With shock, will hold BP rx. Monitor and restart as indicated.    HLD (hyperlipidemia)  Plan  - Continue statin and hold ASA/plavix due to bleed.    Diet:  regular diet  GI PPx: protonix  DVT PPx:   SCD  Airways: room air  Wounds: none    Goals of Care:  Return to prior functional status     Discharge Planning   KIRTI:    Is the patient medically ready for discharge?:     Reason for patient still in hospital (select all that apply): Patient trending condition and Treatment           Ivory Thorpe MD

## 2025-03-09 NOTE — PLAN OF CARE
[facility] Acute Medical Stepdown  Initial Discharge Assessment       Primary Care Provider: Cameron Tran MD    Admission Diagnosis: GI bleed [K92.2]  Heart failure [I50.9]  Chest pain [R07.9]    Admission Date: 3/8/2025  Expected Discharge Date:     Transition of Care Barriers: None    Payor: [employer] MANAGED MEDICARE / Plan: [employer] MEDICARE PLAN PPO / Product Type: Medicare Advantage /     Extended Emergency Contact Information  Primary Emergency Contact: Meenu Partida  Address: [address] [place] [address] [place] [address] [place]  Mobile Phone: [phone]  Relation: Spouse    Discharge Plan A: Home with family  Discharge Plan B: Home Health      [id number] - [place], [place] - [address] [facility]  [address]  [facility] [address]  Phone: [phone] Fax: [phone]      Initial Assessment (most recent)       Adult Discharge Assessment - 03/09/25 1642          Discharge Assessment    Assessment Type Discharge Planning Assessment     Confirmed/corrected address, phone number and insurance Yes     Confirmed Demographics Correct on Facesheet     Source of Information patient     Communicated KIRTI with patient/caregiver Date not available/Unable to determine     Reason For Admission Gi bleed     People in Home spouse     Do you have help at home or someone to help you manage your care at home? Yes     Who are your caregiver(s) and their phone number(s)? Meenu Partida- [phone]     Prior to hospitilization cognitive status: Alert/Oriented     Current cognitive status: Alert/Oriented     Walking or Climbing Stairs Difficulty no     Dressing/Bathing Difficulty no     Equipment Currently Used at Home none     Patient currently being followed by outpatient case management? No     Do you currently have service(s) that help you manage your care at home? No     Do you take prescription medications? Yes     Who is going to help you get home at discharge?  Spouse     How do you get to doctors appointments? car, drives self;family or friend will provide     Are you on dialysis? No     Do you take coumadin? No     Discharge Plan A Home with family     Discharge Plan B Home Health     DME Needed Upon Discharge  none     Discharge Plan discussed with: Patient     Transition of Care Barriers None                     Readmission Assessment (most recent)       Readmission Assessment - 03/09/25 5667          Readmission    Was this a planned readmission? No     Why were you hospitalized in the last 30 days? Bypass surgery 2 weeks ago     Why were you readmitted? Alarmed about signs/symptoms     When you left the hospital where did you go? Home with Family     Did patient/caregiver refused recommended DC plan? No                    Discharge Plan A and Plan B have been determined by review of patient's clinical status, future medical and therapeutic needs, and coverage/benefits for post-acute care in coordination with multidisciplinary team members.     Jason Prieto LCSW

## 2025-03-09 NOTE — PROGRESS NOTES
Pharmacokinetic Initial Assessment: IV Vancomycin    Assessment/Plan:    Initiate intravenous vancomycin with loading dose of 1250 mg once followed by a maintenance dose of vancomycin 1000mg IV every 12 hours  Desired empiric serum trough concentration is 10 to 20 mcg/mL  Draw vancomycin trough level 60 min prior to fourth dose on 3/10 at approximately 2000  Pharmacy will continue to follow and monitor vancomycin.      Please contact pharmacy at extension [phone] with any questions regarding this assessment.     Thank you for the consult,   Taurus Nam       Patient brief summary:  Ye Partida is a 69 y.o. male initiated on antimicrobial therapy with IV Vancomycin for treatment of suspected sepsis    Drug Allergies:   Review of patient's allergies indicates:   Allergen Reactions    Antihistamines - alkylamine Other (See Comments)     Heart race    Codeine Other (See Comments)     Heart race       Actual Body Weight:   60.3 kg    Renal Function:   Estimated Creatinine Clearance: 74.3 mL/min (based on SCr of 0.8 mg/dL).,     Dialysis Method (if applicable):  N/A

## 2025-03-09 NOTE — PLAN OF CARE
Pt AAO, VSS, no c/o pain.  Pt able to ambulate independently to bathroom, c/o dyspnea and dizziness once back to bed.  Pt received 1U PRBC this shift. Pt had 2 dark stools this shift.  Pt started on soft diet today, NPO after midnight for scope in AM.   Bed low and locked, safety maintained, call light in reach.     Problem: Adult Inpatient Plan of Care  Goal: Plan of Care Review  Outcome: Progressing  Goal: Patient-Specific Goal (Individualized)  Outcome: Progressing  Goal: Absence of Hospital-Acquired Illness or Injury  Outcome: Progressing  Goal: Optimal Comfort and Wellbeing  Outcome: Progressing  Goal: Readiness for Transition of Care  Outcome: Progressing     Problem: Gastrointestinal Bleeding  Goal: Optimal Coping with Acute Illness  Outcome: Progressing  Goal: Hemostasis  Outcome: Progressing     Problem: Wound  Goal: Optimal Coping  Outcome: Progressing  Goal: Optimal Functional Ability  Outcome: Progressing  Goal: Absence of Infection Signs and Symptoms  Outcome: Progressing  Goal: Improved Oral Intake  Outcome: Progressing  Goal: Optimal Pain Control and Function  Outcome: Progressing  Goal: Skin Health and Integrity  Outcome: Progressing  Goal: Optimal Wound Healing  Outcome: Progressing     Problem: Fall Injury Risk  Goal: Absence of Fall and Fall-Related Injury  Outcome: Progressing

## 2025-03-09 NOTE — ED TRIAGE NOTES
"Ye Partida, a 69 y.o. male presents to the ED w/ complaint of melena.     Pt had coronary artery bypass surg 2 weeks ago. Has been feeling lethargic and weak. Was trying to leave to go to ED today, sat on floor to prevent fall. Pt had episode of LOC and woke up "in blood".     Pt has hematoma to head, + coffee ground emesis, + melena.     Per EMS, B/P in field was 78/0. Pt received about 500 mL NS and 4 mg of zofran.     Triage note:  Chief Complaint   Patient presents with    Melena     Arrives via EMS from home. + GI bleed with EMS, pale, lethargic, diaphoretic. 2 weeks ago received coronary artery bypass. + blood thinners. Symptoms started 1 hour ago. + fall with head trauma, LOC, emesis, and melena.      Review of patient's allergies indicates:   Allergen Reactions    Antihistamines - alkylamine Other (See Comments)     Heart race    Codeine Other (See Comments)     Heart race     Past Medical History:   Diagnosis Date    Hyperlipidemia     Pyloric stenosis, congenital     s/p repair    Seizures        "

## 2025-03-09 NOTE — HPI
Mr. Partida is a 69 year old male with pyloric stenosis s/p surgical intervention and CAD s/p CABG 2 weeks ago who presented to [facility] ED with hematemesis. Patient reports progressive lethargy, weakness, and poor PO intake x 3 days. Reports he recently stopped his blood pressure medications last week as his BP were consistently 70s/40s. Today, he attempted to present to the emergency department and was so weak that he attempted to crawl on the floor to the car, but subsequently had syncope and woke up with coffee ground emesis surrounding him. EMS reports emesis around patient with head abrasion. EMS reports SBP 60s and he was given 600 mL of IVF with improvement in blood pressure.     In our emergency department, Hgb 8.7 and HDS. CTH completed given syncope with abrasion. He was given protonix, zofran, and ceftriaxone. 1u PRBC ordered.

## 2025-03-09 NOTE — PLAN OF CARE
Hospital Medicine Plan of Care  Pt with decrease in BP to 90/50s and tachycardia as well as feeling dizzy/nauseated in bed after 1u PRBC and with continuous IVF. No noted further episodes of hematemesis or melena. Stat labs reordered after delay from initial scheduled orders. 500ml bolus given with improvement in tachycardia and dizziness but BP still borderline with SBP 90s. Repeat CBC with drop to 7.7 possibly in setting of continued bleeding/dilution with IVF. Ordered prepare 2u and tranfuse 1 stat. Continue IVF. With continued elevated WBC and borderline hypotension will also broaden abx and obtain blood culture. Guarded status so notified MICU and rapid response.     Yeison Monk DO  03/09/2025 6:48 AM

## 2025-03-09 NOTE — ASSESSMENT & PLAN NOTE
Difficult to characterize event as pt reported while he was already lying on the floor while having hematemesis/reported possible melena so hard to characterize exactly as orthostatic but with his shock could contribute. Pt denied chest pain, palpitations, dyspnea and his fatigue/weakness/decreased exercise tolerance appears likely related to bleeding and his exam does not suggest worsening CAD/HF. EKG without acute arrhthymias or ischemic changes.    Plan  - Monitor on tele and for ACS sxs.  - Order BNP and echo.  - Treatment for possible contributing etiologies as elsewhere described.

## 2025-03-09 NOTE — CONSULTS
Consult Note  Cardiothoracic Surgery    Inpatient consult to Cardiothoracic Surgery  Consult performed by: Randy Troncoso DO  Consult ordered by: Kasey Barfield MD        SUBJECTIVE:     History of Present Illness:  Ye Partida is a 69 y.o. male prior smoker (quit 40s) with CAD s/p recent CABG x2 (Dr. Leal) on 02/21/2025 on ASA/plavix for remaining circumflex stenosis, HFreF (40-50% 02/2025) HTN, HLD, and epilepsy who presents for worsening fatigue and weakness over the last ~1x week. Pt and wife report he began to have low BP ~1x week ago (70/40s at home) and he felt very fatigued with generalized weakness. They discussed with CTS who recommended stopping lasix and metoprolol. Pt and wife report he felt better and had improved BP after that and even was able to walk on 3/5 and 3/6 then felt fatigued the next day which he thought was because he overdid it with the exercise. On the day of presentation, pt's fatigue and generalized weakness had persisted and worsened to the point where the patient had very decreased appetite and felt too weak to get up by himself and at that time, pt and wife decided that he needed to go to the ED for evaluation. They also noted he had become diaphoretic and fatigued with any sort of exertion but denied any chest pain, dyspnea, palpitations, LE edema. They tried to help him get up and walk to leave but pt was very weak and had to rest on the ground as his legs started to go out from under him and he began to have dark/coffee ground emesis and melena so wife placed him on his side and called EMS. Pt reports feeling like he passed out briefly while he was down and woke up to hear his wife talking on the phone about him. Per EMS report, patient found hypotensive with SBP in the 60s. EMS started gave him about 600 cc of normal saline with improvement in BP. Pt discussed with MICU and CTS in ED who recommended 1u blood transfusion and admission to .     Patient stable  this morning, currently receiving 1u prbc for hypotension 90s/50s overnight, volume responsive. Hemodynamically stable currently. Denies any worsening chest pain, shortness of breath, palpitations. Nausea improved.    Scheduled Meds:   atorvastatin  40 mg Oral Daily    lamoTRIgine  200 mg Oral Daily    LORazepam  0.5 mg Oral BID    pantoprazole  40 mg Intravenous BID    piperacillin-tazobactam (Zosyn) IV (PEDS and ADULTS) (extended infusion is not appropriate)  4.5 g Intravenous Q8H    vancomycin (VANCOCIN) IV (PEDS and ADULTS)  15 mg/kg Intravenous Q12H     Infusions/Drips:  PRN Meds:  Current Facility-Administered Medications:     0.9%  NaCl infusion (for blood administration), , Intravenous, Q24H PRN    acetaminophen, 650 mg, Oral, Q4H PRN    dextrose 50%, 12.5 g, Intravenous, PRN    dextrose 50%, 25 g, Intravenous, PRN    glucagon (human recombinant), 1 mg, Intramuscular, PRN    glucose, 16 g, Oral, PRN    glucose, 24 g, Oral, PRN    insulin aspart U-100, 0-5 Units, Subcutaneous, Q6H PRN    melatonin, 6 mg, Oral, Nightly PRN    naloxone, 0.2 mg, Intravenous, PRN    ondansetron, 8 mg, Oral, Q8H PRN    polyethylene glycol, 17 g, Oral, Daily PRN    prochlorperazine, 10 mg, Oral, Q6H PRN    senna-docusate 8.6-50 mg, 1 tablet, Oral, BID PRN    sodium chloride 0.9%, 10 mL, Intravenous, Q12H PRN    Pharmacy to dose Vancomycin consult, , , Once **AND** vancomycin - pharmacy to dose, , Intravenous, pharmacy to manage frequency    Review of patient's allergies indicates:   Allergen Reactions    Antihistamines - alkylamine Other (See Comments)     Heart race    Codeine Other (See Comments)     Heart race       Past Medical History:   Diagnosis Date    Hyperlipidemia     Pyloric stenosis, congenital     s/p repair    Seizures      Past Surgical History:   Procedure Laterality Date    CORONARY ANGIOGRAPHY Right 2/19/2025    Procedure: ANGIOGRAM, CORONARY ARTERY;  Surgeon: Ari Latham III, MD;  Location: [facility] CATH LAB;   Service: Cardiology;  Laterality: Right;    CORONARY ARTERY BYPASS GRAFT (CABG) N/A 2/21/2025    Procedure: CORONARY ARTERY BYPASS GRAFT (CABG);  Surgeon: Flavio Leal MD;  Location: [facility] OR [address] [place] [address];  Service: Cardiovascular;  Laterality: N/A;  CABG x 2  LIMA TO LAD  SVG TO RCA    ENDOSCOPIC HARVEST OF VEIN Left 2/21/2025    Procedure: HARVEST-VEIN-ENDOVASCULAR;  Surgeon: Flavio Leal MD;  Location: [facility] OR [address] [place] [address];  Service: Cardiovascular;  Laterality: Left;  VEIN START-1043  VEIN OUT-1112  VEIN PREP START-1113  VEIN READY-1131    pyloric stenosis      TONSILLECTOMY       Family History   Problem Relation Name Age of Onset    Cancer Mother          breast ca    Arthritis Mother      Hypertension Mother      Cancer Father          prostate ca    Alcohol abuse Sister      Hypertension Sister      Obesity Sister      Heart disease Sister      Alcohol abuse Brother      Hypertension Brother      Obesity Brother      Kidney disease Son      Diabetes Son      Crohn's disease Son       Social History[1]     Review of Systems:  A 12-point ROS was obtained and noted to be negative except for that listed in the HPI.      OBJECTIVE:     Vital Signs (Most Recent)  Temp: 97.6 °F (36.4 °C) (03/09/25 1000)  Pulse: 98 (03/09/25 1000)  Resp: 18 (03/09/25 1000)  BP: 107/67 (03/09/25 1000)  SpO2: 99 % (03/09/25 1000)    Admission Weight: 60.3 kg (133 lb) (03/08/25 1813)   Most Recent Weight: 60.3 kg (132 lb 15 oz) (03/09/25 0557)    Vital Signs Range (Last 24H):  Temp:  [97.4 °F (36.3 °C)-98.3 °F (36.8 °C)]   Pulse:  []   Resp:  [16-21]   BP: ()/(57-69)   SpO2:  [98 %-100 %]     Physical Exam:  Vitals reviewed.   Constitutional:       General: He is not in acute distress.     Appearance: He is not diaphoretic.   HENT:      Head: Normocephalic and atraumatic.      Mouth/Throat:      Mouth: Mucous membranes are dry.   Cardiovascular:      Rate and Rhythm: Regular rhythm. Tachycardia present.   Pulmonary:      Effort:  Pulmonary effort is normal. No respiratory distress.   Abdominal:      General: Bowel sounds are normal. There is no distension.      Palpations: Abdomen is soft.      Tenderness: There is no abdominal tenderness. There is no guarding or rebound.   Musculoskeletal:      Right lower leg: No edema.      Left lower leg: No edema.   Skin:     General: Skin is warm and dry.      Coloration: Skin is pale.   Neurological:      General: No focal deficit present.      Mental Status: He is alert and oriented to person, place, and time.   Psychiatric:         Mood and Affect: Mood normal.         Behavior: Behavior normal.     Laboratory:  I have reviewed all pertinent lab results within the past 24 hours.    Diagnostic Results:  X-Ray: Reviewed  CT: Reviewed    ASSESSMENT/PLAN:     Ye Partida is a 69 y.o. male prior smoker (quit 40s) with CAD s/p recent CABG x2 (Dr. Leal) on 02/21/2025 on ASA/plavix for remaining circumflex stenosis, HFreF (40-50% 02/2025) HTN, HLD, and epilepsy now admitted for workup and management of GI bleed.    From cardiac surgery standpoint, obviously agree with holding ASA/plavix for now, eventually restart when medically cleared from GI bleed. Judicious volume resuscitation to balance acute requirements and recent cardiac surgery (we usually limit daily intake to 1500 cc during this timeframe for reference). Maintain sternal precautions and continue statin. PRN analgesics for recent sternotomy. Encourage continued ambulation when able. Agree with echo to rule out low cardiac output contributing to the picture, although low suspicion. Had normal biventricular function pre- and post-bypass.     Agree with GI consultation for possible endoscopic evaluation. Leukocytosis is likely reactive but agree with broad spectrum abx coverage in the meantime. Will continue to follow.      Randy Troncoso DO  Cardiothoracic Surgery Fellow    CTS Attending Note:    I have personally taken the history and  examined this patient and agree with the resident's note as stated above.         [1]   Social History  Tobacco Use    Smoking status: Former     Current packs/day: 0.00     Types: Cigarettes     Quit date:      Years since quittin.2    Smokeless tobacco: Never   Substance Use Topics    Alcohol use: No    Drug use: No

## 2025-03-09 NOTE — ED PROVIDER NOTES
Encounter Date: 3/8/2025       History     Chief Complaint   Patient presents with    Melena     Arrives via EMS from home. + GI bleed with EMS, pale, lethargic, diaphoretic. 2 weeks ago received coronary artery bypass. + blood thinners. Symptoms started 1 hour ago. + fall with head trauma, LOC, emesis, and melena.      69-year-old male, history of pyloric stenosis as a child with surgery, CAD, status post CABG 2 weeks ago here, now brought in by EMS for upper GI bleed.  Patient is taking aspirin and Plavix daily, today he started vomiting coffee-ground emesis, multiple times.  He is also having dark stools.  EMS found patient hypotensive with a systolic pressure in the 60s.  They noted that there was melena and coffee-ground emesis all over the home.  Apparently when patient was trying to get to the door when they were going to come without EMS, he also passed out and hit his head.  They started an IV and gave him about 600 cc of normal saline with improvement in his blood pressure.  Patient reports that his blood pressures were running low last week and so he actually was taken off 2 of his blood pressure medications for this reason.    The history is provided by the patient and the EMS personnel.     Review of patient's allergies indicates:   Allergen Reactions    Antihistamines - alkylamine Other (See Comments)     Heart race    Codeine Other (See Comments)     Heart race     Past Medical History:   Diagnosis Date    Hyperlipidemia     Pyloric stenosis, congenital     s/p repair    Seizures      Past Surgical History:   Procedure Laterality Date    CORONARY ANGIOGRAPHY Right 2/19/2025    Procedure: ANGIOGRAM, CORONARY ARTERY;  Surgeon: Ari Latham III, MD;  Location: [facility] CATH LAB;  Service: Cardiology;  Laterality: Right;    CORONARY ARTERY BYPASS GRAFT (CABG) N/A 2/21/2025    Procedure: CORONARY ARTERY BYPASS GRAFT (CABG);  Surgeon: Flavio Leal MD;  Location: [facility] OR [address] [place] [address];  Service: Cardiovascular;   Laterality: N/A;  CABG x 2  LIMA TO LAD  SVG TO RCA    ENDOSCOPIC HARVEST OF VEIN Left 2/21/2025    Procedure: HARVEST-VEIN-ENDOVASCULAR;  Surgeon: Flavio Leal MD;  Location: [facility] OR [address] [place] [address];  Service: Cardiovascular;  Laterality: Left;  VEIN START-1043  VEIN OUT-1112  VEIN PREP START-1113  VEIN READY-1131    pyloric stenosis      TONSILLECTOMY       Family History   Problem Relation Name Age of Onset    Cancer Mother          breast ca    Arthritis Mother      Hypertension Mother      Cancer Father          prostate ca    Alcohol abuse Sister      Hypertension Sister      Obesity Sister      Heart disease Sister      Alcohol abuse Brother      Hypertension Brother      Obesity Brother      Kidney disease Son      Diabetes Son      Crohn's disease Son       Social History[1]  Review of Systems    Physical Exam     Initial Vitals [03/08/25 1813]   BP Pulse Resp Temp SpO2   104/67 90 18 97.4 °F (36.3 °C) 100 %      MAP       --         Physical Exam    Nursing note and vitals reviewed.  Constitutional: He appears ill.   HENT:   Coffee-ground emesis all over face   Eyes:   Pale conjunctiva   Neck: Neck supple.   Cardiovascular:  Normal rate.           Pulmonary/Chest: No respiratory distress.   Surgical incisions clean dry intact   Abdominal: Abdomen is soft. He exhibits no distension. There is no abdominal tenderness. There is no rebound and no guarding.   Genitourinary: Rectum:      Guaiac result positive.   Guaiac positive stool. : Acceptable.   Genitourinary Comments: + melena     Musculoskeletal:         General: No edema.      Cervical back: Neck supple.     Neurological: He is alert and oriented to person, place, and time. He has normal strength.   Skin: There is pallor.         ED Course   Procedures  Labs Reviewed   CBC W/ AUTO DIFFERENTIAL - Abnormal       Result Value    WBC 21.68 (*)     RBC 2.96 (*)     Hemoglobin 8.7 (*)     Hematocrit 27.7 (*)     MCV 94      MCH 29.4      MCHC 31.4  (*)     RDW 14.7 (*)     Platelets 786 (*)     MPV 9.4      Immature Granulocytes 0.9 (*)     Gran # (ANC) 19.2 (*)     Immature Grans (Abs) 0.20 (*)     Lymph # 1.2      Mono # 1.0      Eos # 0.1      Baso # 0.08      nRBC 0      Gran % 88.4 (*)     Lymph % 5.7 (*)     Mono % 4.4      Eosinophil % 0.2      Basophil % 0.4      Differential Method Automated     COMPREHENSIVE METABOLIC PANEL - Abnormal    Sodium 144      Potassium 3.7      Chloride 109      CO2 26      Glucose 126 (*)     BUN 36 (*)     Creatinine 0.8      Calcium 8.0 (*)     Total Protein 5.1 (*)     Albumin 2.7 (*)     Total Bilirubin 0.2      Alkaline Phosphatase 83      AST 27      ALT 12      eGFR >60.0      Anion Gap 9     ISTAT PROCEDURE - Abnormal    POC Glucose 126 (*)     POC BUN 34 (*)     POC Creatinine 1.0      POC Sodium 143      POC Potassium 3.7      POC Chloride 104      POC TCO2 (MEASURED) 26      POC Ionized Calcium 1.15      POC Hematocrit 23 (*)     Sample ADAM     LACTIC ACID, PLASMA    Lactate (Lactic Acid) 1.8     LIPASE    Lipase 23     URINALYSIS, REFLEX TO URINE CULTURE    Specimen UA Urine, Clean Catch      Color, UA Yellow      Appearance, UA Clear      pH, UA 5.0      Specific Gravity, UA 1.030      Protein, UA Negative      Glucose, UA Negative      Ketones, UA Negative      Bilirubin (UA) Negative      Occult Blood UA Negative      Nitrite, UA Negative      Leukocytes, UA Negative      Narrative:     Specimen Source->Urine   SARS-COV2 (COVID) WITH FLU/RSV BY PCR    SARS-CoV2 (COVID-19) Qualitative PCR Not Detected      Influenza A, Molecular Not Detected      Influenza B, Molecular Not Detected      RSV Ag by Molecular Method Not Detected     CBC W/ AUTO DIFFERENTIAL   LACTIC ACID, PLASMA   TYPE & SCREEN    Group & Rh A POS      Indirect Randee NEG      Specimen Outdate 03/11/2025 23:59     ISTAT CHEM8   POCT GLUCOSE MONITORING CONTINUOUS   PREPARE RBC SOFT    UNIT NUMBER [id number]      Product Code V3922G01       DISPENSE STATUS ISSUED      CODING SYSTEM [id number]      Unit Blood Type Code 6200      Unit Blood Type A POS      Unit Expiration [id number]      CROSSMATCH INTERPRETATION Compatible            Imaging Results              X-Ray Chest AP Portable (In process)                      CT Head Without Contrast (Final result)  Result time 03/08/25 21:42:21      Final result by Ayo Pearson MD (03/08/25 21:42:21)                   Impression:      No acute intracranial hemorrhage or major vascular distribution infarct.    Small focal area of encephalomalacia in the right internal capsule, most likely lacunar type infarct and technically age indeterminate given no priors for comparison.  If there is concern for acute infarct in this area, MRI can be performed if patient compatible.    Electronically signed by resident: Ricardo Cortez  Date:    03/08/2025  Time:    20:43    Electronically signed by: Ayo Pearson  Date:    03/08/2025  Time:    21:42               Narrative:    EXAMINATION:  CT HEAD WITHOUT CONTRAST    CLINICAL HISTORY:  Head trauma, minor (Age >= 65y);    TECHNIQUE:  Low dose axial CT images obtained throughout the head without the use of intravenous contrast.  Axial, sagittal and coronal reconstructions were performed.    COMPARISON:  No relevant priors.    FINDINGS:  Intracranial compartment:    Mild generalized cerebral volume loss with compensatory enlargement of ventricles and sulci.  No evidence of hydrocephalus..    Mild patchy hypoattenuation of the supratentorial white matter, nonspecific most likely represent chronic small vessel ischemic change.  More focal areas of encephalomalacia in the right internal capsule, technically age indeterminate lacunar type infarct.  No parenchymal  hemorrhage, edema, mass effect or major vascular distribution infarct.    No extra-axial blood or fluid collections.    Skull/extracranial contents (limited evaluation):    No displaced calvarial fracture.    The  mastoid air cells and visualized paranasal sinuses are essentially clear.     imaging: Atomic detail poorly visualized; C2-C3 grade 1 anterolisthesis is suggested.                                       Medications   0.9%  NaCl infusion (for blood administration) (has no administration in time range)   sodium chloride 0.9% flush 10 mL (has no administration in time range)   melatonin tablet 6 mg (has no administration in time range)   ondansetron disintegrating tablet 8 mg (has no administration in time range)   prochlorperazine tablet 10 mg (has no administration in time range)   polyethylene glycol packet 17 g (has no administration in time range)   senna-docusate 8.6-50 mg per tablet 1 tablet (has no administration in time range)   acetaminophen tablet 650 mg (has no administration in time range)   naloxone 0.4 mg/mL injection 0.2 mg (has no administration in time range)   glucose chewable tablet 16 g (has no administration in time range)   glucose chewable tablet 24 g (has no administration in time range)   dextrose 50% injection 25 g (has no administration in time range)   pantoprazole injection 40 mg (has no administration in time range)   dextrose 50% injection 12.5 g (has no administration in time range)   glucagon (human recombinant) injection 1 mg (has no administration in time range)   insulin aspart U-100 pen 0-5 Units (has no administration in time range)   atorvastatin tablet 40 mg (has no administration in time range)   lamoTRIgine tablet 200 mg (has no administration in time range)   LORazepam tablet 0.5 mg (0.5 mg Oral Given 3/8/25 2236)   pantoprazole injection 80 mg (80 mg Intravenous Given 3/8/25 1910)   cefTRIAXone injection 2 g (2 g Intravenous Given 3/8/25 1953)   ondansetron injection 4 mg (4 mg Intravenous Given 3/8/25 2121)     Medical Decision Making  Patient is presenting with symptoms concerning for an acute GI bleed.  Differential diagnosis for upper GI bleed would include,  esophagitis, esophageal varices, Margaret-Rock tear, gastric or duodenal ulcer, gastritis, mass/neoplasm, AVM.      I have a high suspicion for ulcer as the etiology for this GI bleed.    As part of the work-up for suspected GI bleed, the following interventions will be performed:    IV x 2  Labs, including CBC, CMP, INR, T&S  Close monitoring with frequent VS checks  Transfuse if Hgb < 7  Hold anticoagulant/antiplatelet agents  Protonix if suspicion for upper GI bleed with ulcer source  GI consult  Anticipate admission      Amount and/or Complexity of Data Reviewed  External Data Reviewed: labs and notes.  Labs: ordered. Decision-making details documented in ED Course.  Radiology: ordered and independent interpretation performed. Decision-making details documented in ED Course.  ECG/medicine tests: ordered and independent interpretation performed. Decision-making details documented in ED Course.    Risk  Prescription drug management.  Decision regarding hospitalization.              Attending Attestation:         Attending Critical Care:   Critical Care Times:   ==============================================================  Total Critical Care Time - exclusive of procedural time: 35 minutes.  ==============================================================  Critical care was necessary to treat or prevent imminent or life-threatening deterioration of the following conditions: GI bleeding.   Critical care was time spent personally by me on the following activities: review of x-rays / CT sent with the patient, obtaining history from patient or relative, examination of patient, review of old charts, ordering lab, x-rays, and/or EKG, development of treatment plan with patient or relative, ordering and performing treatments and interventions, discussion with consultants, evaluation of patient's response to treatment and re-evaluation of patient's conition.   Critical Care Condition: life-threatening           ED Course as  of 25 2254   Sat Mar 08, 2025   1927 Hemoglobin(!): 8.7 [AS]    WBC(!): 21.68 [AS]    Hemoglobin with a small drop from his discharge hemoglobin 2 weeks ago.  As blood pressure has normalized, we will hold off on transfusion for now [AS]    BUN(!): 36 [AS]    Creatinine: 0.8 [AS]    Discussed case with the Cardiothoracic fellow who recommends admission to Medicine, they will follow along [AS]    Discussed the case with the MICU team will come evaluate the patient.  They are requesting 1 unit of blood [AS]      ED Course User Index  [AS] Kasey Barfield MD                           Clinical Impression:  Final diagnoses:  [K92.2] GI bleed (Primary)          ED Disposition Condition    Admit Stable                    [1]   Social History  Tobacco Use    Smoking status: Former     Current packs/day: 0.00     Types: Cigarettes     Quit date:      Years since quittin.2    Smokeless tobacco: Never   Substance Use Topics    Alcohol use: No    Drug use: No        Kasey Barfield MD  25 2254

## 2025-03-09 NOTE — CONSULTS
[facility]  Gastroenterology  Consult Note    Patient Name: Ye Partida  MRN: [id number]  Admission Date: 3/8/2025  Hospital Length of Stay: 1 days  Code Status: Full Code   Attending Provider: Ivory Thorpe MD   Consulting Provider: Rhonda Pimentel MD  Primary Care Physician: Cameron Tran MD  Principal Problem:Gastrointestinal bleed    Inpatient consult to Gastroenterology  Consult performed by: Rhonda Pimentel MD  Consult ordered by: Kasey Barfield MD        Subjective:     HPI: Ye Partida is a 69 y.o. male with history of prior smoker (quit 40s) with CAD s/p recent CABG 02/21/2025 on ASA/plavix, HFreF (40-50% 02/2025) HTN, HLD, pyloric stenosis as a child, and epilepsy who presents for worsening fatigue and weakness over the last ~1x week, coffee ground emesis immediately prior to presentation. GI consulted for upper gi bleed. Hb 7.6, leukocytosis on broad spec abx, and BUN elevated and rising. HD stable.   Patient feels better since IVF and PRBC infusions. Wife at bedside. Patient had some nausea which is improving after he had some coca cola. No appetite for food. Still weak but no dizziness/lightheadedness. No further vomiting, and patient denies pain, melena, hematochezia.         ROS     Objective:     Vitals:    03/09/25 1216   BP: 104/65   Pulse: 107   Resp: 18   Temp:          Constitutional:  not in acute distress and well developed  HENT: Head: Normal, normocephalic, atraumatic.  Eyes: conjunctiva clear and sclera nonicteric  GI: soft, nondistended, and nontender  Skin: normal color  Neurological: alert, oriented x3      Significant Labs:  Reviewed the pertinent laboratory tests.     Significant Imaging:  No pertinent imaging.      Assessment/Plan:     Ye Partida is a 69 y.o. male with history of prior smoker (quit 40s) with CAD s/p recent CABG 02/21/2025 on ASA/plavix, HFreF (40-50% 02/2025) HTN, HLD, pyloric stenosis as a child, and  epilepsy who presents for worsening fatigue and weakness over the last ~1x week, coffee ground emesis immediately prior to presentation. GI consulted for upper gi bleed. Hb 7.6, leukocytosis on broad spec abx, and BUN elevated and rising. HD stable.    Problem List:  Concern for UGIB with elevated BUN and Hb 7.6    Recommendations:  - continue to hold plavix as able, last dose 3/8 AM  - check cbc q8h and transfuse to maintain Hb >7 or higher per cards recs   - patient counseled on discontinuing use of extra strength excedrin he's been using for his neck pain which presumably cardiology also wouldn't recommend  - antiemetics per primary  - diet okay today as tolerated  - npo at midnight for EGD tomorrow  - workup for possible sepsis pending, defer to primary     Thank you for involving us in the care of Ye Partida. Please call with any additional questions, concerns or changes in the patient's clinical status. We will continue to follow.     Rhonda Pimentel MD  Gastroenterology and Hepatology Fellow, PGY-4

## 2025-03-09 NOTE — HPI
Ye Partida is a 69 y.o. male prior smoker (quit 40s) with CAD s/p recent CABG 02/21/2025 on ASA/plavix, HFreF (40-50% 02/2025) HTN, HLD, pyloric stenosis as a child, and epilepsy who presents for worsening fatigue and weakness over the last ~1x week. Pt and wife report he began to have low BP ~1x week ago (70/40s at home) and he felt very fatigued with generalized weakness. They discussed with CTS who recommended stopping lasix and metoprolol. Pt and wife report he felt better and had improved BP after that and even was able to exercise (walking) on 3/5 and 3/6 then felt fatigued the next day which he thought was because he overdid it with the exercise. On the day of presentation, pt's fatigue and generalized weakness had persisted and worsened to the point where the patient had very decreased appetite and felt too weak to get up by himself and at that time, pt and wife decided that he needed to go to the ED for evaluation. They also noted he had become diaphoretic and fatigued with any sort of exertion but denied any chest pain, dyspnea, palpitations, LE edema. They tried to help him get up and walk to leave but pt was very weak and had to rest on the ground as his legs started to go out from under him and he began to have dark/coffee ground emesis so wife placed him on his side and called EMS. Pt reports feeling like he passed out briefly while he was down and woke up to hear his wife talking on the phone about him. Per EMS report, patient found hypotensive with SBP in the 60s. They noted that there was melena and coffee-ground emesis all over the home however pt and wife did not note any melena/hematochezia at that time or before. EMS started gave him about 600 cc of normal saline with improvement in BP.  Pt discussed with MICU and CTS in ED who recommended 1u blood transfusion and admission to . Pt denies heavy alcohol use currently or in the past including any issues with withdrawal.

## 2025-03-09 NOTE — ED NOTES
I-STAT Chem-8+ Results:   Value Reference Range   Sodium 143 136-145 mmol/L   Potassium  3.7 3.5-5.1 mmol/L   Chloride 104  mmol/L   Ionized Calcium 1.15 1.06-1.42 mmol/L   CO2 (measured) 26 23-29 mmol/L   Glucose 126  mg/dL   BUN 34 6-30 mg/dL   Creatinine 1.0 0.5-1.4 mg/dL   Hematocrit 23 36-54%

## 2025-03-09 NOTE — SUBJECTIVE & OBJECTIVE
Past Medical History:   Diagnosis Date    Hyperlipidemia     Pyloric stenosis, congenital     s/p repair    Seizures        Past Surgical History:   Procedure Laterality Date    CORONARY ANGIOGRAPHY Right 2/19/2025    Procedure: ANGIOGRAM, CORONARY ARTERY;  Surgeon: Ari Latham III, MD;  Location: [facility] CATH [facility];  Service: Cardiology;  Laterality: Right;    CORONARY ARTERY BYPASS GRAFT (CABG) N/A 2/21/2025    Procedure: CORONARY ARTERY BYPASS GRAFT (CABG);  Surgeon: Flavio Leal MD;  Location: [facility] OR [address] [place] [address];  Service: Cardiovascular;  Laterality: N/A;  CABG x 2  LIMA TO LAD  SVG TO RCA    ENDOSCOPIC HARVEST OF VEIN Left 2/21/2025    Procedure: HARVEST-VEIN-ENDOVASCULAR;  Surgeon: Flavio Leal MD;  Location: [facility] [address] [place] [address];  Service: Cardiovascular;  Laterality: Left;  VEIN START-1043  VEIN OUT-1112  VEIN PREP START-1113  VEIN READY-1131    pyloric stenosis      TONSILLECTOMY         Review of patient's allergies indicates:   Allergen Reactions    Antihistamines - alkylamine Other (See Comments)     Heart race    Codeine Other (See Comments)     Heart race       No current facility-administered medications on file prior to encounter.     Current Outpatient Medications on File Prior to Encounter   Medication Sig    acetaminophen (TYLENOL) 500 MG tablet Take 2 tablets (1,000 mg total) by mouth every 6 (six) hours as needed for Pain.    ascorbic acid (VITAMIN C) 1000 MG tablet Take 1,000 mg by mouth once daily.    aspirin 81 MG Chew Chew and swallow 1 tablet (81 mg total) by mouth once daily.    atorvastatin (LIPITOR) 40 MG tablet Take 1 tablet (40 mg total) by mouth once daily.    clopidogreL (PLAVIX) 75 mg tablet Take 1 tablet (75 mg total) by mouth once daily.    cyanocobalamin (VITAMIN B-12) 100 MCG tablet Take 100 mcg by mouth once daily.    famotidine (PEPCID) 20 MG tablet Take 1 tablet (20 mg total) by mouth 2 (two) times daily.    furosemide (LASIX) 20 MG tablet Take 1 tablet (20 mg total) by mouth  2 (two) times a day for 7 days, THEN 1 tablet (20 mg total) once daily.    lamotrigine (LAMICTAL) 200 MG tablet Take 1 tablet by mouth  daily    lorazepam (ATIVAN) 0.5 MG tablet Take 1 tablet (0.5 mg total) by mouth 2 (two) times daily. Take 1/2 tablet (0.25mg) in am, take 1 tablet (0.5mg) in pm (Patient taking differently: Take 0.5 mg by mouth 2 (two) times daily.)    melatonin (MELATIN) 3 mg tablet Take 2 tablets (6 mg total) by mouth nightly as needed for Insomnia.    metoprolol tartrate (LOPRESSOR) 25 MG tablet Take 1 tablet (25 mg total) by mouth 2 (two) times daily.    potassium chloride SA (K-DUR,KLOR-CON) 20 MEQ tablet Take 1 tablet (20 mEq total) by mouth 2 (two) times a day for 7 days, THEN 1 tablet (20 mEq total) once daily.    tamsulosin (FLOMAX) 0.4 mg Cap Take 1 capsule (0.4 mg total) by mouth once daily.     Family History       Problem Relation (Age of Onset)    Alcohol abuse Sister, Brother    Arthritis Mother    Cancer Mother, Father    Crohn's disease Son    Diabetes Son    Heart disease Sister    Hypertension Mother, Sister, Brother    Kidney disease Son    Obesity Sister, Brother          Tobacco Use    Smoking status: Former     Current packs/day: 0.00     Types: Cigarettes     Quit date:      Years since quittin.2    Smokeless tobacco: Never   Substance and Sexual Activity    Alcohol use: No    Drug use: No    Sexual activity: Yes     Partners: Female     Review of Systems   Constitutional:  Positive for appetite change, diaphoresis and fatigue. Negative for fever.   HENT:  Negative for congestion, rhinorrhea and sore throat.    Eyes:  Negative for visual disturbance.   Respiratory:  Negative for cough and shortness of breath.    Cardiovascular:  Negative for chest pain, palpitations and leg swelling.   Gastrointestinal:  Positive for nausea and vomiting. Negative for abdominal pain.   Genitourinary:  Negative for decreased urine volume, dysuria and hematuria.   Skin:  Negative for  rash.   Neurological:  Positive for dizziness, syncope, weakness (Generalized weakness) and light-headedness.   Hematological:  Does not bruise/bleed easily.     Objective:     Vital Signs (Most Recent):  Temp: 98.3 °F (36.8 °C) (03/08/25 2132)  Pulse: 104 (03/08/25 2132)  Resp: 16 (03/08/25 2132)  BP: 96/62 (03/08/25 2132)  SpO2: 100 % (03/08/25 2132) Vital Signs (24h Range):  Temp:  [97.4 °F (36.3 °C)-98.3 °F (36.8 °C)] 98.3 °F (36.8 °C)  Pulse:  [] 104  Resp:  [16-21] 16  SpO2:  [99 %-100 %] 100 %  BP: ()/(59-69) 96/62     Weight: 60.3 kg (133 lb)  Body mass index is 20.22 kg/m².     Physical Exam  Vitals reviewed.   Constitutional:       General: He is not in acute distress.     Appearance: He is not diaphoretic.   HENT:      Head: Normocephalic and atraumatic.      Mouth/Throat:      Mouth: Mucous membranes are dry.   Eyes:      General:         Right eye: No discharge.         Left eye: No discharge.      Conjunctiva/sclera: Conjunctivae normal.      Pupils: Pupils are equal, round, and reactive to light.   Cardiovascular:      Rate and Rhythm: Regular rhythm. Tachycardia present.      Heart sounds: Normal heart sounds.   Pulmonary:      Effort: Pulmonary effort is normal. No respiratory distress.      Breath sounds: Normal breath sounds. No wheezing or rales.   Abdominal:      General: Bowel sounds are normal. There is no distension.      Palpations: Abdomen is soft.      Tenderness: There is no abdominal tenderness. There is no guarding or rebound.   Musculoskeletal:      Cervical back: Normal range of motion and neck supple.      Right lower leg: No edema.      Left lower leg: No edema.   Skin:     General: Skin is warm and dry.      Coloration: Skin is pale.   Neurological:      General: No focal deficit present.      Mental Status: He is alert and oriented to person, place, and time.      Cranial Nerves: Cranial nerves 2-12 are intact. No dysarthria or facial asymmetry.      Motor: No  weakness, tremor or abnormal muscle tone.   Psychiatric:         Mood and Affect: Mood normal.         Behavior: Behavior normal.            CRANIAL NERVES     CN III, IV, VI   Pupils are equal, round, and reactive to light.       Significant Labs: All pertinent labs within the past 24 hours have been reviewed.  Recent Lab Results         03/08/25 1929 03/08/25 1913 03/08/25  1857        Unit Blood Type Code     6200  [P]       Unit Expiration     [id number]  [P]       Unit Blood Type     A POS  [P]       Albumin 2.7           ALP 83           ALT 12           Anion Gap 9           AST 27           Baso #     0.08       Basophil %     0.4       BILIRUBIN TOTAL 0.2  Comment: For infants and newborns, interpretation of results should be based  on gestational age, weight and in agreement with clinical  observations.    Premature Infant recommended reference ranges:  Up to 24 hours.............<8.0 mg/dL  Up to 48 hours............<12.0 mg/dL  3-5 days..................<15.0 mg/dL  6-29 days.................<15.0 mg/dL             BUN 36           Calcium 8.0           Chloride 109           CO2 26           CODING SYSTEM     [id number]  [P]       Creatinine 0.8           Crossmatch Interpretation     Compatible  [P]       Differential Method     Automated       DISPENSE STATUS     ISSUED  [P]       eGFR >60.0           Eos #     0.1       Eos %     0.2       Glucose 126           Gran # (ANC)     19.2       Gran %     88.4       Group & Rh     A POS       Hematocrit     27.7       Hemoglobin     8.7       Immature Grans (Abs)     0.20  Comment: Mild elevation in immature granulocytes is non specific and   can be seen in a variety of conditions including stress response,   acute inflammation, trauma and pregnancy. Correlation with other   laboratory and clinical findings is essential.         Immature Granulocytes     0.9       INDIRECT MATTHEW     NEG       Lactic Acid Level     1.8  Comment: Falsely low lactic  acid results can be found in samples   containing >=13.0 mg/dL total bilirubin and/or >=3.5 mg/dL   direct bilirubin.         Lipase 23           Lymph #     1.2       Lymph %     5.7       MCH     29.4       MCHC     31.4       MCV     94       Mono #     1.0       Mono %     4.4       MPV     9.4       nRBC     0       Platelet Count     786       POC BUN   34         POC Chloride   104         POC Creatinine   1.0         POC Glucose   126         POC Hematocrit   23         POC Ionized Calcium   1.15         POC Potassium   3.7         POC Sodium   143         POC TCO2 (MEASURED)   26         Potassium 3.7           Product Code     [id number]  [P]       PROTEIN TOTAL 5.1           RBC     2.96       RDW     14.7       Sample   ADAM         Sodium 144           Specimen Outdate     03/11/2025 23:59       UNIT NUMBER     [id number]  [P]       WBC     21.68                [P] - Preliminary Result               Significant Imaging: I have reviewed all pertinent imaging results/findings within the past 24 hours.  Imaging Results              X-Ray Chest AP Portable (In process)                      CT Head Without Contrast (Final result)  Result time 03/08/25 21:42:21      Final result by Ayo Pearson MD (03/08/25 21:42:21)                   Impression:      No acute intracranial hemorrhage or major vascular distribution infarct.    Small focal area of encephalomalacia in the right internal capsule, most likely lacunar type infarct and technically age indeterminate given no priors for comparison.  If there is concern for acute infarct in this area, MRI can be performed if patient compatible.    Electronically signed by resident: Ricardo Cortez  Date:    03/08/2025  Time:    20:43    Electronically signed by: Ayo Pearson  Date:    03/08/2025  Time:    21:42               Narrative:    EXAMINATION:  CT HEAD WITHOUT CONTRAST    CLINICAL HISTORY:  Head trauma, minor (Age >= 65y);    TECHNIQUE:  Low dose axial CT  images obtained throughout the head without the use of intravenous contrast.  Axial, sagittal and coronal reconstructions were performed.    COMPARISON:  No relevant priors.    FINDINGS:  Intracranial compartment:    Mild generalized cerebral volume loss with compensatory enlargement of ventricles and sulci.  No evidence of hydrocephalus..    Mild patchy hypoattenuation of the supratentorial white matter, nonspecific most likely represent chronic small vessel ischemic change.  More focal areas of encephalomalacia in the right internal capsule, technically age indeterminate lacunar type infarct.  No parenchymal  hemorrhage, edema, mass effect or major vascular distribution infarct.    No extra-axial blood or fluid collections.    Skull/extracranial contents (limited evaluation):    No displaced calvarial fracture.    The mastoid air cells and visualized paranasal sinuses are essentially clear.     imaging: Atomic detail poorly visualized; C2-C3 grade 1 anterolisthesis is suggested.

## 2025-03-09 NOTE — ASSESSMENT & PLAN NOTE
Patient's hemorrhage is due to gastrointestinal bleed while on DAPT after CABG. Patients most recent Hgb, Hct, platelets, and INR are listed below.  Recent Labs     03/08/25  1857 03/08/25  1913   HGB 8.7*  --    HCT 27.7* 23*   *  --      Plan  - Will trend hemoglobin/hematocrit Every 6 hours  - Will monitor and correct any coagulation defects  - Will transfuse if Hgb is <7g/dl (<8g/dl in cases of active ACS) or if patient has rapid bleeding leading to hemodynamic instability  - GI consulted.

## 2025-03-09 NOTE — ASSESSMENT & PLAN NOTE
Noted small focal area of encephalomalacia in the right internal capsule, per radiology suspected possible lacunar type infarct but technically age indeterminate given no priors for comparison. Exam and hx in addition to improvement with tx are reassuring against acute CVA.    Plan  - Consider follow up non-emergent MRI.

## 2025-03-10 ENCOUNTER — ANESTHESIA (OUTPATIENT)
Dept: ENDOSCOPY | Facility: HOSPITAL | Age: 70
End: 2025-03-10
Payer: MEDICARE

## 2025-03-10 ENCOUNTER — ANESTHESIA EVENT (OUTPATIENT)
Dept: ENDOSCOPY | Facility: HOSPITAL | Age: 70
End: 2025-03-10
Payer: MEDICARE

## 2025-03-10 PROBLEM — R57.8 HEMORRHAGIC SHOCK: Status: ACTIVE | Noted: 2025-03-09

## 2025-03-10 LAB
ALBUMIN SERPL BCP-MCNC: 2.5 G/DL (ref 3.5–5.2)
ALP SERPL-CCNC: 65 U/L (ref 40–150)
ALT SERPL W/O P-5'-P-CCNC: 10 U/L (ref 10–44)
ANION GAP SERPL CALC-SCNC: 5 MMOL/L (ref 8–16)
AST SERPL-CCNC: 19 U/L (ref 10–40)
BASOPHILS # BLD AUTO: 0.05 K/UL (ref 0–0.2)
BASOPHILS # BLD AUTO: 0.07 K/UL (ref 0–0.2)
BASOPHILS # BLD AUTO: 0.08 K/UL (ref 0–0.2)
BASOPHILS NFR BLD: 0.4 % (ref 0–1.9)
BASOPHILS NFR BLD: 0.4 % (ref 0–1.9)
BASOPHILS NFR BLD: 0.5 % (ref 0–1.9)
BILIRUB SERPL-MCNC: 0.4 MG/DL (ref 0.1–1)
BLD PROD TYP BPU: NORMAL
BLOOD UNIT EXPIRATION DATE: NORMAL
BLOOD UNIT TYPE CODE: 6200
BLOOD UNIT TYPE: NORMAL
BUN SERPL-MCNC: 31 MG/DL (ref 8–23)
BUN SERPL-MCNC: 53 MG/DL (ref 6–30)
CALCIUM SERPL-MCNC: 7.6 MG/DL (ref 8.7–10.5)
CHLORIDE SERPL-SCNC: 104 MMOL/L (ref 95–110)
CHLORIDE SERPL-SCNC: 111 MMOL/L (ref 95–110)
CO2 SERPL-SCNC: 26 MMOL/L (ref 23–29)
CODING SYSTEM: NORMAL
CREAT SERPL-MCNC: 0.9 MG/DL (ref 0.5–1.4)
CREAT SERPL-MCNC: 1.2 MG/DL (ref 0.5–1.4)
CROSSMATCH INTERPRETATION: NORMAL
DIFFERENTIAL METHOD BLD: ABNORMAL
DISPENSE STATUS: NORMAL
EOSINOPHIL # BLD AUTO: 0.2 K/UL (ref 0–0.5)
EOSINOPHIL # BLD AUTO: 0.2 K/UL (ref 0–0.5)
EOSINOPHIL # BLD AUTO: 0.3 K/UL (ref 0–0.5)
EOSINOPHIL NFR BLD: 0.9 % (ref 0–8)
EOSINOPHIL NFR BLD: 1.2 % (ref 0–8)
EOSINOPHIL NFR BLD: 2.3 % (ref 0–8)
ERYTHROCYTE [DISTWIDTH] IN BLOOD BY AUTOMATED COUNT: 14.8 % (ref 11.5–14.5)
ERYTHROCYTE [DISTWIDTH] IN BLOOD BY AUTOMATED COUNT: 15.1 % (ref 11.5–14.5)
ERYTHROCYTE [DISTWIDTH] IN BLOOD BY AUTOMATED COUNT: 15.3 % (ref 11.5–14.5)
EST. GFR  (NO RACE VARIABLE): >60 ML/MIN/1.73 M^2
GLUCOSE SERPL-MCNC: 104 MG/DL (ref 70–110)
GLUCOSE SERPL-MCNC: 138 MG/DL (ref 70–110)
HCT VFR BLD AUTO: 19.3 % (ref 40–54)
HCT VFR BLD AUTO: 20.8 % (ref 40–54)
HCT VFR BLD AUTO: 22.4 % (ref 40–54)
HCT VFR BLD CALC: 27 %PCV (ref 36–54)
HGB BLD-MCNC: 6.1 G/DL (ref 14–18)
HGB BLD-MCNC: 6.5 G/DL (ref 14–18)
HGB BLD-MCNC: 7.1 G/DL (ref 14–18)
IMM GRANULOCYTES # BLD AUTO: 0.07 K/UL (ref 0–0.04)
IMM GRANULOCYTES # BLD AUTO: 0.11 K/UL (ref 0–0.04)
IMM GRANULOCYTES # BLD AUTO: 0.19 K/UL (ref 0–0.04)
IMM GRANULOCYTES NFR BLD AUTO: 0.6 % (ref 0–0.5)
IMM GRANULOCYTES NFR BLD AUTO: 0.7 % (ref 0–0.5)
IMM GRANULOCYTES NFR BLD AUTO: 0.9 % (ref 0–0.5)
LYMPHOCYTES # BLD AUTO: 1.8 K/UL (ref 1–4.8)
LYMPHOCYTES # BLD AUTO: 1.9 K/UL (ref 1–4.8)
LYMPHOCYTES # BLD AUTO: 2.7 K/UL (ref 1–4.8)
LYMPHOCYTES NFR BLD: 15.7 % (ref 18–48)
LYMPHOCYTES NFR BLD: 18.5 % (ref 18–48)
LYMPHOCYTES NFR BLD: 8.8 % (ref 18–48)
MAGNESIUM SERPL-MCNC: 2 MG/DL (ref 1.6–2.6)
MCH RBC QN AUTO: 29.3 PG (ref 27–31)
MCH RBC QN AUTO: 29.6 PG (ref 27–31)
MCH RBC QN AUTO: 30.1 PG (ref 27–31)
MCHC RBC AUTO-ENTMCNC: 31.3 G/DL (ref 32–36)
MCHC RBC AUTO-ENTMCNC: 31.6 G/DL (ref 32–36)
MCHC RBC AUTO-ENTMCNC: 31.7 G/DL (ref 32–36)
MCV RBC AUTO: 94 FL (ref 82–98)
MCV RBC AUTO: 94 FL (ref 82–98)
MCV RBC AUTO: 95 FL (ref 82–98)
MONOCYTES # BLD AUTO: 0.9 K/UL (ref 0.3–1)
MONOCYTES # BLD AUTO: 1.2 K/UL (ref 0.3–1)
MONOCYTES # BLD AUTO: 1.3 K/UL (ref 0.3–1)
MONOCYTES NFR BLD: 5.6 % (ref 4–15)
MONOCYTES NFR BLD: 8.2 % (ref 4–15)
MONOCYTES NFR BLD: 8.7 % (ref 4–15)
NEUTROPHILS # BLD AUTO: 10.4 K/UL (ref 1.8–7.7)
NEUTROPHILS # BLD AUTO: 17.9 K/UL (ref 1.8–7.7)
NEUTROPHILS # BLD AUTO: 8.1 K/UL (ref 1.8–7.7)
NEUTROPHILS NFR BLD: 70.4 % (ref 38–73)
NEUTROPHILS NFR BLD: 72.8 % (ref 38–73)
NEUTROPHILS NFR BLD: 83.4 % (ref 38–73)
NRBC BLD-RTO: 0 /100 WBC
OHS QRS DURATION: 74 MS
OHS QTC CALCULATION: 399 MS
PHOSPHATE SERPL-MCNC: 2.7 MG/DL (ref 2.7–4.5)
PLATELET # BLD AUTO: 332 K/UL (ref 150–450)
PLATELET # BLD AUTO: 358 K/UL (ref 150–450)
PLATELET # BLD AUTO: 377 K/UL (ref 150–450)
PMV BLD AUTO: 9.3 FL (ref 9.2–12.9)
PMV BLD AUTO: 9.7 FL (ref 9.2–12.9)
PMV BLD AUTO: 9.8 FL (ref 9.2–12.9)
POC IONIZED CALCIUM: 1.09 MMOL/L (ref 1.06–1.42)
POC TCO2 (MEASURED): 29 MMOL/L (ref 23–27)
POTASSIUM BLD-SCNC: 6 MMOL/L (ref 3.5–5.1)
POTASSIUM SERPL-SCNC: 3.9 MMOL/L (ref 3.5–5.1)
PROT SERPL-MCNC: 4.3 G/DL (ref 6–8.4)
RBC # BLD AUTO: 2.06 M/UL (ref 4.6–6.2)
RBC # BLD AUTO: 2.22 M/UL (ref 4.6–6.2)
RBC # BLD AUTO: 2.36 M/UL (ref 4.6–6.2)
SAMPLE: ABNORMAL
SODIUM BLD-SCNC: 140 MMOL/L (ref 136–145)
SODIUM SERPL-SCNC: 142 MMOL/L (ref 136–145)
TRANS ERYTHROCYTES VOL PATIENT: NORMAL ML
WBC # BLD AUTO: 11.18 K/UL (ref 3.9–12.7)
WBC # BLD AUTO: 14.78 K/UL (ref 3.9–12.7)
WBC # BLD AUTO: 21.47 K/UL (ref 3.9–12.7)

## 2025-03-10 PROCEDURE — 27200997: Performed by: STUDENT IN AN ORGANIZED HEALTH CARE EDUCATION/TRAINING PROGRAM

## 2025-03-10 PROCEDURE — D9220A PRA ANESTHESIA: Mod: ANES,,, | Performed by: ANESTHESIOLOGY

## 2025-03-10 PROCEDURE — 25000003 PHARM REV CODE 250

## 2025-03-10 PROCEDURE — D9220A PRA ANESTHESIA: Mod: CRNA,,,

## 2025-03-10 PROCEDURE — 43255 EGD CONTROL BLEEDING ANY: CPT | Mod: ,,, | Performed by: STUDENT IN AN ORGANIZED HEALTH CARE EDUCATION/TRAINING PROGRAM

## 2025-03-10 PROCEDURE — 86920 COMPATIBILITY TEST SPIN: CPT | Performed by: INTERNAL MEDICINE

## 2025-03-10 PROCEDURE — 43255 EGD CONTROL BLEEDING ANY: CPT | Performed by: STUDENT IN AN ORGANIZED HEALTH CARE EDUCATION/TRAINING PROGRAM

## 2025-03-10 PROCEDURE — 37000008 HC ANESTHESIA 1ST 15 MINUTES: Performed by: STUDENT IN AN ORGANIZED HEALTH CARE EDUCATION/TRAINING PROGRAM

## 2025-03-10 PROCEDURE — 84100 ASSAY OF PHOSPHORUS: CPT | Performed by: STUDENT IN AN ORGANIZED HEALTH CARE EDUCATION/TRAINING PROGRAM

## 2025-03-10 PROCEDURE — 36415 COLL VENOUS BLD VENIPUNCTURE: CPT | Performed by: STUDENT IN AN ORGANIZED HEALTH CARE EDUCATION/TRAINING PROGRAM

## 2025-03-10 PROCEDURE — P9021 RED BLOOD CELLS UNIT: HCPCS | Performed by: INTERNAL MEDICINE

## 2025-03-10 PROCEDURE — 80053 COMPREHEN METABOLIC PANEL: CPT | Performed by: STUDENT IN AN ORGANIZED HEALTH CARE EDUCATION/TRAINING PROGRAM

## 2025-03-10 PROCEDURE — P9021 RED BLOOD CELLS UNIT: HCPCS | Performed by: STUDENT IN AN ORGANIZED HEALTH CARE EDUCATION/TRAINING PROGRAM

## 2025-03-10 PROCEDURE — 63600175 PHARM REV CODE 636 W HCPCS: Performed by: STUDENT IN AN ORGANIZED HEALTH CARE EDUCATION/TRAINING PROGRAM

## 2025-03-10 PROCEDURE — 85025 COMPLETE CBC W/AUTO DIFF WBC: CPT | Performed by: STUDENT IN AN ORGANIZED HEALTH CARE EDUCATION/TRAINING PROGRAM

## 2025-03-10 PROCEDURE — 25000003 PHARM REV CODE 250: Performed by: STUDENT IN AN ORGANIZED HEALTH CARE EDUCATION/TRAINING PROGRAM

## 2025-03-10 PROCEDURE — 63600175 PHARM REV CODE 636 W HCPCS: Performed by: INTERNAL MEDICINE

## 2025-03-10 PROCEDURE — 37000009 HC ANESTHESIA EA ADD 15 MINS: Performed by: STUDENT IN AN ORGANIZED HEALTH CARE EDUCATION/TRAINING PROGRAM

## 2025-03-10 PROCEDURE — 0W3P8ZZ CONTROL BLEEDING IN GASTROINTESTINAL TRACT, VIA NATURAL OR ARTIFICIAL OPENING ENDOSCOPIC: ICD-10-PCS | Performed by: STUDENT IN AN ORGANIZED HEALTH CARE EDUCATION/TRAINING PROGRAM

## 2025-03-10 PROCEDURE — 25000003 PHARM REV CODE 250: Performed by: INTERNAL MEDICINE

## 2025-03-10 PROCEDURE — 83735 ASSAY OF MAGNESIUM: CPT | Performed by: STUDENT IN AN ORGANIZED HEALTH CARE EDUCATION/TRAINING PROGRAM

## 2025-03-10 PROCEDURE — 63600175 PHARM REV CODE 636 W HCPCS

## 2025-03-10 PROCEDURE — 20600001 HC STEP DOWN PRIVATE ROOM

## 2025-03-10 RX ORDER — SODIUM CHLORIDE 0.9 % (FLUSH) 0.9 %
3 SYRINGE (ML) INJECTION
Status: DISCONTINUED | OUTPATIENT
Start: 2025-03-10 | End: 2025-03-11 | Stop reason: HOSPADM

## 2025-03-10 RX ORDER — HALOPERIDOL LACTATE 5 MG/ML
0.5 INJECTION, SOLUTION INTRAMUSCULAR EVERY 10 MIN PRN
Status: DISCONTINUED | OUTPATIENT
Start: 2025-03-10 | End: 2025-03-11 | Stop reason: HOSPADM

## 2025-03-10 RX ORDER — PROCHLORPERAZINE EDISYLATE 5 MG/ML
5 INJECTION INTRAMUSCULAR; INTRAVENOUS EVERY 30 MIN PRN
Status: DISCONTINUED | OUTPATIENT
Start: 2025-03-10 | End: 2025-03-11 | Stop reason: HOSPADM

## 2025-03-10 RX ORDER — THIAMINE HCL 100 MG
100 TABLET ORAL DAILY
Status: DISCONTINUED | OUTPATIENT
Start: 2025-03-10 | End: 2025-03-11 | Stop reason: HOSPADM

## 2025-03-10 RX ORDER — GLUCAGON 1 MG
1 KIT INJECTION
Status: DISCONTINUED | OUTPATIENT
Start: 2025-03-10 | End: 2025-03-11 | Stop reason: HOSPADM

## 2025-03-10 RX ORDER — DIPHENHYDRAMINE HYDROCHLORIDE 50 MG/ML
25 INJECTION, SOLUTION INTRAMUSCULAR; INTRAVENOUS EVERY 6 HOURS PRN
Status: DISCONTINUED | OUTPATIENT
Start: 2025-03-10 | End: 2025-03-11 | Stop reason: HOSPADM

## 2025-03-10 RX ORDER — FUROSEMIDE 10 MG/ML
40 INJECTION INTRAMUSCULAR; INTRAVENOUS ONCE
Status: COMPLETED | OUTPATIENT
Start: 2025-03-10 | End: 2025-03-10

## 2025-03-10 RX ORDER — LIDOCAINE HYDROCHLORIDE 20 MG/ML
INJECTION INTRAVENOUS
Status: DISCONTINUED | OUTPATIENT
Start: 2025-03-10 | End: 2025-03-10

## 2025-03-10 RX ORDER — PROPOFOL 10 MG/ML
VIAL (ML) INTRAVENOUS
Status: DISCONTINUED | OUTPATIENT
Start: 2025-03-10 | End: 2025-03-10

## 2025-03-10 RX ORDER — HYDROCODONE BITARTRATE AND ACETAMINOPHEN 500; 5 MG/1; MG/1
TABLET ORAL
Status: DISCONTINUED | OUTPATIENT
Start: 2025-03-10 | End: 2025-03-10

## 2025-03-10 RX ORDER — PHENYLEPHRINE HYDROCHLORIDE 10 MG/ML
INJECTION INTRAVENOUS
Status: DISCONTINUED | OUTPATIENT
Start: 2025-03-10 | End: 2025-03-10

## 2025-03-10 RX ORDER — FENTANYL CITRATE 50 UG/ML
25 INJECTION, SOLUTION INTRAMUSCULAR; INTRAVENOUS EVERY 5 MIN PRN
Status: DISCONTINUED | OUTPATIENT
Start: 2025-03-10 | End: 2025-03-11 | Stop reason: HOSPADM

## 2025-03-10 RX ADMIN — FOLIC ACID-PYRIDOXINE-CYANOCOBALAMIN TAB 2.5-25-2 MG 1 TABLET: 2.5-25-2 TAB at 05:03

## 2025-03-10 RX ADMIN — LIDOCAINE HYDROCHLORIDE 100 MG: 20 INJECTION INTRAVENOUS at 02:03

## 2025-03-10 RX ADMIN — PIPERACILLIN SODIUM AND TAZOBACTAM SODIUM 4.5 G: 4; .5 INJECTION, POWDER, FOR SOLUTION INTRAVENOUS at 03:03

## 2025-03-10 RX ADMIN — PIPERACILLIN SODIUM AND TAZOBACTAM SODIUM 4.5 G: 4; .5 INJECTION, POWDER, FOR SOLUTION INTRAVENOUS at 05:03

## 2025-03-10 RX ADMIN — VANCOMYCIN HYDROCHLORIDE 1000 MG: 1 INJECTION, POWDER, LYOPHILIZED, FOR SOLUTION INTRAVENOUS at 08:03

## 2025-03-10 RX ADMIN — GLYCOPYRROLATE 0.2 MG: 0.2 INJECTION, SOLUTION INTRAMUSCULAR; INTRAVENOUS at 02:03

## 2025-03-10 RX ADMIN — LORAZEPAM 0.5 MG: 0.5 TABLET ORAL at 08:03

## 2025-03-10 RX ADMIN — FUROSEMIDE 40 MG: 10 INJECTION, SOLUTION INTRAVENOUS at 05:03

## 2025-03-10 RX ADMIN — SODIUM CHLORIDE: 0.9 INJECTION, SOLUTION INTRAVENOUS at 02:03

## 2025-03-10 RX ADMIN — PHENYLEPHRINE HYDROCHLORIDE 100 MCG: 10 INJECTION INTRAVENOUS at 03:03

## 2025-03-10 RX ADMIN — PANTOPRAZOLE SODIUM 40 MG: 40 INJECTION, POWDER, FOR SOLUTION INTRAVENOUS at 08:03

## 2025-03-10 RX ADMIN — LAMOTRIGINE 200 MG: 25 TABLET ORAL at 08:03

## 2025-03-10 RX ADMIN — PROPOFOL 150 MCG/KG/MIN: 10 INJECTION, EMULSION INTRAVENOUS at 02:03

## 2025-03-10 RX ADMIN — PIPERACILLIN SODIUM AND TAZOBACTAM SODIUM 4.5 G: 4; .5 INJECTION, POWDER, FOR SOLUTION INTRAVENOUS at 10:03

## 2025-03-10 RX ADMIN — PROPOFOL 100 MG: 10 INJECTION, EMULSION INTRAVENOUS at 02:03

## 2025-03-10 RX ADMIN — ATORVASTATIN CALCIUM 40 MG: 40 TABLET, FILM COATED ORAL at 08:03

## 2025-03-10 RX ADMIN — Medication 100 MG: at 05:03

## 2025-03-10 NOTE — ASSESSMENT & PLAN NOTE
Mildly reduced EF. Evolemic to hypovolemic appearing on exam.    Plan  -BNP  Recent Labs   Lab 03/09/25  0441   *       - Hold BB and other meds due to hypotension at this time. Restart as able.  - will give furosemide 40 mg IV once    Results for orders placed during the hospital encounter of 02/19/25    Echo    Interpretation Summary    Left Ventricle: The left ventricle is normal in size. Ventricular mass is normal. Normal wall thickness. Regional wall motion abnormalities present in RCA and LCX distribution. See diagram for wall motion findings. There is mildly reduced systolic function with a visually estimated ejection fraction of 40 - 50%. Quantitated ejection fraction is 47%. Global longitudinal strain is -14.0%. There is normal diastolic function.    Right Ventricle: Normal right ventricular cavity size. Wall thickness is normal. Systolic function is normal.    Left Atrium: Left atrium is mildly dilated. The left atrium volume index is 36 mL/m2.    Mitral Valve: There is mild regurgitation with a centrally directed jet.    Aorta: Aortic root is normal in size measuring 3.68 cm. Ascending aorta is mildly dilated measuring 3.77 cm.    IVC/SVC: Normal venous pressure at 3 mmHg.

## 2025-03-10 NOTE — PROGRESS NOTES
Pharmacokinetic Assessment Follow Up: IV Vancomycin    Vancomycin order has been discontinued. Pharmacy will sign off. Please reconsult as needed!     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

## 2025-03-10 NOTE — PLAN OF CARE
Pt AAO, VSS, no c/o pain.  Pt hgb 6.1 this AM, 2U PRBC transfused.  Pt went for EGD today, bleeding ulcer found on exam 2 clips placed.  Pt wife at bedside throughout shift.  Pt able to ambulate to bathroom with standby assist.  Bed low and locked, safety maintained, call light in reach.    Problem: Adult Inpatient Plan of Care  Goal: Plan of Care Review  Outcome: Progressing  Goal: Patient-Specific Goal (Individualized)  Outcome: Progressing  Goal: Absence of Hospital-Acquired Illness or Injury  Outcome: Progressing  Goal: Optimal Comfort and Wellbeing  Outcome: Progressing  Goal: Readiness for Transition of Care  Outcome: Progressing     Problem: Gastrointestinal Bleeding  Goal: Optimal Coping with Acute Illness  Outcome: Progressing  Goal: Hemostasis  Outcome: Progressing     Problem: Wound  Goal: Optimal Coping  Outcome: Progressing  Goal: Optimal Functional Ability  Outcome: Progressing  Goal: Absence of Infection Signs and Symptoms  Outcome: Progressing  Goal: Improved Oral Intake  Outcome: Progressing  Goal: Optimal Pain Control and Function  Outcome: Progressing  Goal: Skin Health and Integrity  Outcome: Progressing  Goal: Optimal Wound Healing  Outcome: Progressing     Problem: Fall Injury Risk  Goal: Absence of Fall and Fall-Related Injury  Outcome: Progressing

## 2025-03-10 NOTE — PROVATION PATIENT INSTRUCTIONS
Discharge Summary/Instructions after an Endoscopic Procedure  Patient Name: Ye Partida  Patient MRN: [id number]  Patient YOB: 1955  Monday, March 10, 2025  Oneil Key MD  Dear patient,  As a result of recent federal legislation (The Federal Cures Act), you may   receive lab or pathology results from your procedure in your [facility]   account before your physician is able to contact you. Your physician or   their representative will relay the results to you with their   recommendations at their soonest availability.  Thank you,  RESTRICTIONS:  During your procedure today, you received medications for sedation.  These   medications may affect your judgment, balance and coordination.  Therefore,   for 24 hours, you have the following restrictions:   - DO NOT drive a car, operate machinery, make legal/financial decisions,   sign important papers or drink alcohol.    ACTIVITY:  Today: no heavy lifting, straining or running due to procedural   sedation/anesthesia.  The following day: return to full activity including work.  DIET:  Eat and drink normally unless instructed otherwise.     TREATMENT FOR COMMON SIDE EFFECTS:  - Mild abdominal pain, nausea, belching, bloating or excessive gas:  rest,   eat lightly and use a heating pad.  - Sore Throat: treat with throat lozenges and/or gargle with warm salt   water.  - Because air was used during the procedure, expelling large amounts of air   from your rectum or belching is normal.  - If a bowel prep was taken, you may not have a bowel movement for 1-3 days.    This is normal.  SYMPTOMS TO WATCH FOR AND REPORT TO YOUR PHYSICIAN:  1. Abdominal pain or bloating, other than gas cramps.  2. Chest pain.  3. Back pain.  4. Signs of infection such as: chills or fever occurring within 24 hours   after the procedure.  5. Rectal bleeding, which would show as bright red, maroon, or black stools.   (A tablespoon of blood from the rectum is not serious, especially if    hemorrhoids are present.)  6. Vomiting.  7. Weakness or dizziness.  GO DIRECTLY TO THE NEAREST EMERGENCY ROOM IF YOU HAVE ANY OF THE FOLLOWING:      Difficulty breathing              Chills and/or fever over 101 F   Persistent vomiting and/or vomiting blood   Severe abdominal pain   Severe chest pain   Black, tarry stools   Bleeding- more than one tablespoon   Any other symptom or condition that you feel may need urgent attention  Your doctor recommends these additional instructions:  If any biopsies were taken, your doctors clinic will contact you in 1 to 2   weeks with any results.  - Return patient to [facility] for ongoing care.   - Resume previous diet.   - Follow an antireflux regimen.   - Use Protonix (pantoprazole) 40 mg PO BID for 2 months.   - Check h. pylori serology and treat with 14 days of bisumuth quadruple   therapy if positive.  - Avoid NSAIDs such as ibuprofen, motrin, alieve, and goodys/bc powder.  - Repeat upper endoscopy in 2 months to check healing.   - Resume Plavix (clopidogrel) at prior dose tomorrow.  For questions, problems or results please call your physician - Oneil Key MD at Work:  [phone].  [facility], EMERGENCY ROOM PHONE NUMBER: [phone]  IF A COMPLICATION OR EMERGENCY SITUATION ARISES AND YOU ARE UNABLE TO REACH   YOUR PHYSICIAN - GO DIRECTLY TO THE EMERGENCY ROOM.  MD Oneil Grant MD  3/10/2025 4:58:01 PM  This report has been verified and signed electronically.  Dear patient,  As a result of recent federal legislation (The Federal Cures Act), you may   receive lab or pathology results from your procedure in your [facility]   account before your physician is able to contact you. Your physician or   their representative will relay the results to you with their   recommendations at their soonest availability.  Thank you,  PROVATION

## 2025-03-10 NOTE — ASSESSMENT & PLAN NOTE
Symptomatic anemia  Acute Blood loss anemia  Patient's hemorrhage is due to gastrointestinal bleed while on DAPT after CABG. Patients most recent Hgb, Hct, platelets, and INR are listed below.  Recent Labs     03/09/25  0441 03/09/25  1252 03/10/25  0019 03/10/25  0437 03/10/25  1119   HGB 7.7*   < > 6.5* 6.1* 7.1*   HCT 24.2*   < > 20.8* 19.3* 22.4*   *   < > 377 358 332   INR 1.1  --   --   --   --     < > = values in this interval not displayed.     Plan  - S/p transfusion 2 unit PRBCs  - H/H daily  dynamic instability  - GI consulted. EGD today.

## 2025-03-10 NOTE — ASSESSMENT & PLAN NOTE
Possibly reactive in setting of stress, no obvious source although could be GI in nature.  Currently on zosyn emprically

## 2025-03-10 NOTE — TRANSFER OF CARE
"Anesthesia Transfer of Care Note    Patient: Ye Partida    Procedure(s) Performed: Procedure(s) (LRB):  EGD (ESOPHAGOGASTRODUODENOSCOPY) (N/A)    Patient location: PACU    Anesthesia Type: general    Transport from OR: Transported from OR on room air with adequate spontaneous ventilation    Post pain: adequate analgesia    Post assessment: no apparent anesthetic complications and tolerated procedure well    Post vital signs: stable    Level of consciousness: responds to stimulation    Nausea/Vomiting: no nausea/vomiting    Complications: none    Transfer of care protocol was followed      Last vitals: Visit Vitals  /67 (BP Location: Right arm, Patient Position: Lying)   Pulse 92   Temp 36.9 °C (98.4 °F) (Oral)   Resp 16   Ht 5' 8" (1.727 m)   Wt 60.3 kg (132 lb 15 oz)   SpO2 98%   BMI 20.21 kg/m²     "

## 2025-03-10 NOTE — PLAN OF CARE
[facility] Acute Medical [facility]  Discharge Reassessment    Discharge Plan A and Plan B have been determined by review of patient's clinical status, future medical and therapeutic needs, and coverage/benefits for post-acute care in coordination with multidisciplinary team members.     Primary Care Provider: Cameron Tran MD    Expected Discharge Date: 3/12/2025    Reassessment (most recent)       Discharge Reassessment - 03/10/25 1552          Discharge Reassessment    Assessment Type Discharge Planning Reassessment (P)      Discharge Plan discussed with: Spouse/sig other;Patient (P)      Name(s) and Number(s) Meenu Partida (Spouse)  [phone] (Mobile) (P)      Communicated KIRTI with patient/caregiver Yes (P)      Discharge Plan A Home with family (P)      Discharge Plan B Home (P)      DME Needed Upon Discharge  none (P)      Transition of Care Barriers None (P)      Why the patient remains in the hospital Requires continued medical care (P)         Post-Acute Status    Post-Acute Authorization Other (P)      Other Status No Post-Acute Service Needs (P)      Discharge Delays None known at this time (P)                        CM spoke  with patients spouse (Meenu-support person)  [phone]  via phone  to discuss any current  discharge planning.  Patient is not medically ready, patient scheduled for  EGD and abnormal labs.   Patient will dc home with spouse and spouse will transport home.   . KIRTI:  3/13/25      Albertina Richard RN  Case Management  [facility]  [phone]

## 2025-03-10 NOTE — NURSING TRANSFER
Nursing Transfer Note      Reason patient is being transferred: Post procedure    Transfer To: [id number]    Transfer via stretcher    Transfer with cardiac monitoring    Transported by PCT    Transfer Vital Signs:See flowsheet    Order for Tele Monitor? Yes    Telemetry: Box Number [id number], Rate 84, Rhythm NSR, and Telemetry  Didra    Medicines sent: None    Any special needs or follow-up needed: Routine    Patient belongings transferred with patient:  None    Chart send with patient: Yes    Notified: spouse    Patient reassessed at: 3/10/2025 1630 (date, time)

## 2025-03-10 NOTE — ASSESSMENT & PLAN NOTE
Would suspect hypovolemic/hemorrhagic in origin with GIB but could have septic element with leukocytosis however this could also be reactive/hemocentration and no obvious source at this time and s/p a dose of CTX in the ED. Improved with both IVF and now receiving PRBC.    Supported by blood transfusion and IVFs  improving

## 2025-03-10 NOTE — ANESTHESIA PREPROCEDURE EVALUATION
03/10/2025  Pre-operative evaluation for Procedure(s) (LRB):  EGD (ESOPHAGOGASTRODUODENOSCOPY) (N/A)    Ye Partida is a 69 y.o. male here for EGD to evaluate GI bleed. No vomiting today, no abdominal distension or pain    PMHx CAD s/p recent CABG    Problem List[1]    Review of patient's allergies indicates:   Allergen Reactions    Antihistamines - alkylamine Other (See Comments)     Heart race    Codeine Other (See Comments)     Heart race       Medications Ordered Prior to Encounter[2]    Past Surgical History:   Procedure Laterality Date    CORONARY ANGIOGRAPHY Right 2/19/2025    Procedure: ANGIOGRAM, CORONARY ARTERY;  Surgeon: Ari Latham III, MD;  Location: [facility] CATH LAB;  Service: Cardiology;  Laterality: Right;    CORONARY ARTERY BYPASS GRAFT (CABG) N/A 2/21/2025    Procedure: CORONARY ARTERY BYPASS GRAFT (CABG);  Surgeon: Flavio Leal MD;  Location: [facility] OR [address] [place] [address];  Service: Cardiovascular;  Laterality: N/A;  CABG x 2  LIMA TO LAD  SVG TO RCA    ENDOSCOPIC HARVEST OF VEIN Left 2/21/2025    Procedure: HARVEST-VEIN-ENDOVASCULAR;  Surgeon: Flavio Leal MD;  [facility];  Service: Cardiovascular;  Laterality: Left;  VEIN START-1043  VEIN OUT-1112  VEIN PREP START-1113  VEIN READY-1131    pyloric stenosis      TONSILLECTOMY         Social History[3]      CBC:   Recent Labs     03/10/25  0437 03/10/25  1119   WBC 11.18 21.47*   RBC 2.06* 2.36*   HGB 6.1* 7.1*   HCT 19.3* 22.4*    332   MCV 94 95   MCH 29.6 30.1   MCHC 31.6* 31.7*       CMP:   Recent Labs     03/09/25  0441 03/10/25  0437    142   K 3.5 3.9    111*   CO2 21* 26   BUN 42* 31*   CREATININE 0.8 0.9   * 104   MG 2.0 2.0   PHOS 3.8 2.7   CALCIUM 7.9* 7.6*   ALBUMIN 2.6* 2.5*   PROT 4.6* 4.3*   ALKPHOS 75 65   ALT 10 10   AST 17 19   BILITOT 0.7 0.4       INR  Recent Labs      03/09/25  0441   INR 1.1   APTT 21.5                 Pre-op Assessment    I have reviewed the Patient Summary Reports.     I have reviewed the Nursing Notes. I have reviewed the NPO Status.      Review of Systems  Anesthesia Hx:  No problems with previous Anesthesia                Cardiovascular:     Hypertension   CAD   CABG/stent                                       Pulmonary:  Pulmonary Normal                       Neurological:    Neuromuscular Disease,   Seizures                                Endocrine:  Endocrine Normal                Physical Exam    Airway:  Mallampati: II   Mouth Opening: Normal  Tongue: Normal    Chest/Lungs:  Normal Respiratory Rate    Heart:  Rhythm: Regular Rhythm        Anesthesia Plan  Type of Anesthesia, risks & benefits discussed:    Anesthesia Type: Gen Natural Airway  Intra-op Monitoring Plan: Standard ASA Monitors  Induction:  IV  Informed Consent: Informed consent signed with the Patient and all parties understand the risks and agree with anesthesia plan.  All questions answered.   ASA Score: 3    Ready For Surgery From Anesthesia Perspective.     .           [1]   Patient Active Problem List  Diagnosis    Generalized convulsive epilepsy with intractable epilepsy    Osteopenia    Peripheral neuropathy    Drug-induced osteoporosis    Other and unspecified anticonvulsants causing adverse effect in therapeutic use    HLD (hyperlipidemia)    Vitamin B2 deficiency    Vitamin B12 deficiency    Vitamin B1 deficiency    Willing to be kidney donor    Chest pain    HTN (hypertension)    CAD (coronary artery disease)    Transient hyperglycemia post procedure    S/P CABG (coronary artery bypass graft)    Acute blood loss anemia    Hypophosphatemia    Gastrointestinal bleed    Shock    Seizure disorder    HFrEF (heart failure with reduced ejection fraction)    Syncope    Symptomatic anemia    Leukocytosis    Abnormal brain CT   [2]   No current facility-administered medications on file  prior to encounter.     Current Outpatient Medications on File Prior to Encounter   Medication Sig Dispense Refill    acetaminophen (TYLENOL) 500 MG tablet Take 2 tablets (1,000 mg total) by mouth every 6 (six) hours as needed for Pain. 30 tablet 0    ascorbic acid (VITAMIN C) 1000 MG tablet Take 1,000 mg by mouth once daily.      aspirin 81 MG Chew Chew and swallow 1 tablet (81 mg total) by mouth once daily. 30 tablet 11    atorvastatin (LIPITOR) 40 MG tablet Take 1 tablet (40 mg total) by mouth once daily. 30 tablet 11    clopidogreL (PLAVIX) 75 mg tablet Take 1 tablet (75 mg total) by mouth once daily. 30 tablet 3    cyanocobalamin (VITAMIN B-12) 100 MCG tablet Take 100 mcg by mouth once daily.      famotidine (PEPCID) 20 MG tablet Take 1 tablet (20 mg total) by mouth 2 (two) times daily. 60 tablet 0    furosemide (LASIX) 20 MG tablet Take 1 tablet (20 mg total) by mouth 2 (two) times a day for 7 days, THEN 1 tablet (20 mg total) once daily. 45 tablet 3    lamotrigine (LAMICTAL) 200 MG tablet Take 1 tablet by mouth  daily 30 tablet 5    lorazepam (ATIVAN) 0.5 MG tablet Take 1 tablet (0.5 mg total) by mouth 2 (two) times daily. Take 1/2 tablet (0.25mg) in am, take 1 tablet (0.5mg) in pm (Patient taking differently: Take 0.5 mg by mouth 2 (two) times daily.) 5 tablet 0    melatonin (MELATIN) 3 mg tablet Take 2 tablets (6 mg total) by mouth nightly as needed for Insomnia. 30 tablet 0    metoprolol tartrate (LOPRESSOR) 25 MG tablet Take 1 tablet (25 mg total) by mouth 2 (two) times daily. 60 tablet 11    potassium chloride SA (K-DUR,KLOR-CON) 20 MEQ tablet Take 1 tablet (20 mEq total) by mouth 2 (two) times a day for 7 days, THEN 1 tablet (20 mEq total) once daily. 45 tablet 3    tamsulosin (FLOMAX) 0.4 mg Cap Take 1 capsule (0.4 mg total) by mouth once daily. 30 capsule 0   [3]   Social History  Socioeconomic History    Marital status:    Occupational History     Employer: [employer]   Tobacco  Use    Smoking status: Former     Current packs/day: 0.00     Types: Cigarettes     Quit date:      Years since quittin.2    Smokeless tobacco: Never   Substance and Sexual Activity    Alcohol use: No    Drug use: No    Sexual activity: Yes     Partners: Female     Social Drivers of Health     Financial Resource Strain: Low Risk  (3/9/2025)    Overall Financial Resource Strain (CARDIA)     Difficulty of Paying Living Expenses: Not hard at all   Food Insecurity: No Food Insecurity (3/9/2025)    Hunger Vital Sign     Worried About Running Out of Food in the Last Year: Never true     Ran Out of Food in the Last Year: Never true   Transportation Needs: No Transportation Needs (3/8/2025)    PRAPARE - Transportation     Lack of Transportation (Medical): No     Lack of Transportation (Non-Medical): No   Physical Activity: Insufficiently Active (3/8/2025)    Exercise Vital Sign     Days of Exercise per Week: 3 days     Minutes of Exercise per Session: 10 min   Stress: No Stress Concern Present (3/9/2025)    [employer] - Occupational Stress Questionnaire     Feeling of Stress : Not at all   Housing Stability: Low Risk  (3/9/2025)    Housing Stability Vital Sign     Unable to Pay for Housing in the Last Year: No     Number of Times Moved in the Last Year: 0     Homeless in the Last Year: No

## 2025-03-10 NOTE — PROGRESS NOTES
[facility] Medicine  Progress note    Team: [facility] [place] Ivory Thorpe MD  Admit Date: 3/8/2025  Code status: Full Code    Principal Problem:  Gastrointestinal bleed    Interval hx:  Feeling improved. No new complaints. Hungry.    PEx  Temp:  [98.2 °F (36.8 °C)-98.9 °F (37.2 °C)]   Pulse:  [84-96]   Resp:  [16-18]   BP: ()/(55-67)   SpO2:  [98 %-100 %]     Intake/Output Summary (Last 24 hours) at 3/10/2025 1450  Last data filed at 3/10/2025 1235  Gross per 24 hour   Intake 345 ml   Output 1300 ml   Net -955 ml       General Appearance: no acute distress, WD, WN  Heart: regular rate and rhythm, no heave  Respiratory: Normal respiratory effort, symmetric excursion, bilateral vesicular breath sounds   Abdomen: Soft, non-tender; bowel sounds active  Skin: intact, no rash, no ulcers  Neurologic:  No focal numbness or weakness  Mental status: Alert, oriented x 4, affect appropriate    Recent Labs   Lab 03/10/25  0019 03/10/25  0437 03/10/25  1119   WBC 14.78* 11.18 21.47*   HGB 6.5* 6.1* 7.1*   HCT 20.8* 19.3* 22.4*    358 332     Recent Labs   Lab 03/08/25  1929 03/09/25  0441 03/10/25  0437    142 142   K 3.7 3.5 3.9    110 111*   CO2 26 21* 26   BUN 36* 42* 31*   CREATININE 0.8 0.8 0.9   * 129* 104   CALCIUM 8.0* 7.9* 7.6*   MG  --  2.0 2.0   PHOS  --  3.8 2.7   LIPASE 23  --   --      Recent Labs   Lab 03/08/25  1929 03/09/25  0441 03/10/25  0437   ALKPHOS 83 75 65   ALT 12 10 10   AST 27 17 19   ALBUMIN 2.7* 2.6* 2.5*   PROT 5.1* 4.6* 4.3*   BILITOT 0.2 0.7 0.4   INR  --  1.1  --         Recent Labs   Lab 03/09/25  1216 03/09/25  1658   POCTGLUCOSE 128* 124*       Scheduled Meds:   atorvastatin  40 mg Oral Daily    lamoTRIgine  200 mg Oral Daily    LORazepam  0.5 mg Oral BID    pantoprazole  40 mg Intravenous BID    piperacillin-tazobactam (Zosyn) IV (PEDS and ADULTS) (extended infusion is not appropriate)  4.5 g Intravenous Q8H     Continuous Infusions:  As Needed:    Current  Facility-Administered Medications:     0.9%  NaCl infusion (for blood administration), , Intravenous, Q24H PRN    acetaminophen, 650 mg, Oral, Q4H PRN    melatonin, 6 mg, Oral, Nightly PRN    naloxone, 0.2 mg, Intravenous, PRN    ondansetron, 8 mg, Oral, Q8H PRN    polyethylene glycol, 17 g, Oral, Daily PRN    prochlorperazine, 10 mg, Oral, Q6H PRN    senna-docusate 8.6-50 mg, 1 tablet, Oral, BID PRN    sodium chloride 0.9%, 10 mL, Intravenous, Q12H PRN    Assessment and Plan  / Problems managed today    * Gastrointestinal bleed  Symptomatic anemia  Acute Blood loss anemia  Patient's hemorrhage is due to gastrointestinal bleed while on DAPT after CABG. Patients most recent Hgb, Hct, platelets, and INR are listed below.  Recent Labs     03/09/25  0441 03/09/25  1252 03/10/25  0019 03/10/25  0437 03/10/25  1119   HGB 7.7*   < > 6.5* 6.1* 7.1*   HCT 24.2*   < > 20.8* 19.3* 22.4*   *   < > 377 358 332   INR 1.1  --   --   --   --     < > = values in this interval not displayed.     Plan  - S/p transfusion 2 unit PRBCs  - H/H daily  dynamic instability  - GI consulted. EGD today.    Syncope  Attributed to acute UGI bleed  ECHO without contributory findings    Hemorrhagic shock  Would suspect hypovolemic/hemorrhagic in origin with GIB but could have septic element with leukocytosis however this could also be reactive/hemocentration and no obvious source at this time and s/p a dose of CTX in the ED. Improved with both IVF and now receiving PRBC.    Supported by blood transfusion and IVFs  improving    Abnormal brain CT  Noted small focal area of encephalomalacia in the right internal capsule, per radiology suspected possible lacunar type infarct but technically age indeterminate given no priors for comparison. Exam and hx in addition to improvement with tx are reassuring against acute CVA.    Plan  - Consider follow up non-emergent MRI.    Leukocytosis  Possibly reactive in setting of stress, no obvious source  although could be GI in nature.  Currently on zosyn emprically    Symptomatic anemia  Anemia is likely due to acute blood loss which was from GIB . Most recent hemoglobin and hematocrit are listed below.  Recent Labs     03/08/25  1857 03/08/25  1913   HGB 8.7*  --    HCT 27.7* 23*     Plan  - Monitor serial CBC: Every 6 hours  - Transfuse PRBC if patient becomes hemodynamically unstable, symptomatic or H/H drops below 7/21.  - Patient has received 1 units of PRBCs on 03/08/2025    HFrEF (heart failure with reduced ejection fraction)  Mildly reduced EF. Evolemic to hypovolemic appearing on exam.    Plan  -BNP  Recent Labs   Lab 03/09/25  0441   *       - Hold BB and other meds due to hypotension at this time. Restart as able.  - will give furosemide 40 mg IV once    Results for orders placed during the hospital encounter of 02/19/25    Echo    Interpretation Summary    Left Ventricle: The left ventricle is normal in size. Ventricular mass is normal. Normal wall thickness. Regional wall motion abnormalities present in RCA and LCX distribution. See diagram for wall motion findings. There is mildly reduced systolic function with a visually estimated ejection fraction of 40 - 50%. Quantitated ejection fraction is 47%. Global longitudinal strain is -14.0%. There is normal diastolic function.    Right Ventricle: Normal right ventricular cavity size. Wall thickness is normal. Systolic function is normal.    Left Atrium: Left atrium is mildly dilated. The left atrium volume index is 36 mL/m2.    Mitral Valve: There is mild regurgitation with a centrally directed jet.    Aorta: Aortic root is normal in size measuring 3.68 cm. Ascending aorta is mildly dilated measuring 3.77 cm.    IVC/SVC: Normal venous pressure at 3 mmHg.      Seizure disorder  Plan  - Continue home lamictal and ativan.    CAD (coronary artery disease)  Patient with known CAD s/p CABG 02/21/2025. He was on DAPT but in setting of bleeding will hold  and only continue statin at this time. Monitor for S/Sx of angina/ACS. Continue to monitor on telemetry.     HTN (hypertension)  Patient's blood pressure range in the last 24 hours was: BP  Min: 91/59  Max: 112/59.The patient's inpatient anti-hypertensive regimen is listed below:  Current Antihypertensives       Plan  - With shock, will hold BP rx. Monitor and restart as indicated.    HLD (hyperlipidemia)  Plan  - Continue statin and hold ASA/plavix due to bleed.    Diet:  regular diet  GI PPx: protonix  DVT PPx:   SCD  Airways: room air  Wounds: none    Goals of Care:  Return to prior functional status     Discharge Planning   KIRTI: 3/12/2025   Is the patient medically ready for discharge?:     Reason for patient still in hospital (select all that apply): Patient trending condition and Treatment  Discharge Plan A: Home with family        Ivory Thorpe MD

## 2025-03-11 ENCOUNTER — EPISODE CHANGES (OUTPATIENT)
Dept: CARDIOLOGY | Facility: CLINIC | Age: 70
End: 2025-03-11

## 2025-03-11 VITALS
DIASTOLIC BLOOD PRESSURE: 64 MMHG | HEIGHT: 68 IN | SYSTOLIC BLOOD PRESSURE: 105 MMHG | RESPIRATION RATE: 16 BRPM | OXYGEN SATURATION: 99 % | WEIGHT: 132.94 LBS | TEMPERATURE: 98 F | HEART RATE: 77 BPM | BODY MASS INDEX: 20.15 KG/M2

## 2025-03-11 LAB
ALBUMIN SERPL BCP-MCNC: 2.8 G/DL (ref 3.5–5.2)
ALP SERPL-CCNC: 72 U/L (ref 40–150)
ALT SERPL W/O P-5'-P-CCNC: 15 U/L (ref 10–44)
ANION GAP SERPL CALC-SCNC: 8 MMOL/L (ref 8–16)
AST SERPL-CCNC: 22 U/L (ref 10–40)
BASOPHILS # BLD AUTO: 0.06 K/UL (ref 0–0.2)
BASOPHILS NFR BLD: 0.7 % (ref 0–1.9)
BILIRUB SERPL-MCNC: 0.9 MG/DL (ref 0.1–1)
BUN SERPL-MCNC: 19 MG/DL (ref 8–23)
CALCIUM SERPL-MCNC: 7.7 MG/DL (ref 8.7–10.5)
CHLORIDE SERPL-SCNC: 104 MMOL/L (ref 95–110)
CO2 SERPL-SCNC: 27 MMOL/L (ref 23–29)
CREAT SERPL-MCNC: 1 MG/DL (ref 0.5–1.4)
DIFFERENTIAL METHOD BLD: ABNORMAL
EOSINOPHIL # BLD AUTO: 0.3 K/UL (ref 0–0.5)
EOSINOPHIL NFR BLD: 3 % (ref 0–8)
ERYTHROCYTE [DISTWIDTH] IN BLOOD BY AUTOMATED COUNT: 15 % (ref 11.5–14.5)
EST. GFR  (NO RACE VARIABLE): >60 ML/MIN/1.73 M^2
GLUCOSE SERPL-MCNC: 98 MG/DL (ref 70–110)
HCT VFR BLD AUTO: 26.3 % (ref 40–54)
HGB BLD-MCNC: 8.9 G/DL (ref 14–18)
IMM GRANULOCYTES # BLD AUTO: 0.07 K/UL (ref 0–0.04)
IMM GRANULOCYTES NFR BLD AUTO: 0.8 % (ref 0–0.5)
LYMPHOCYTES # BLD AUTO: 1.6 K/UL (ref 1–4.8)
LYMPHOCYTES NFR BLD: 18 % (ref 18–48)
MAGNESIUM SERPL-MCNC: 2 MG/DL (ref 1.6–2.6)
MCH RBC QN AUTO: 30.9 PG (ref 27–31)
MCHC RBC AUTO-ENTMCNC: 33.8 G/DL (ref 32–36)
MCV RBC AUTO: 91 FL (ref 82–98)
MONOCYTES # BLD AUTO: 0.9 K/UL (ref 0.3–1)
MONOCYTES NFR BLD: 9.9 % (ref 4–15)
NEUTROPHILS # BLD AUTO: 6 K/UL (ref 1.8–7.7)
NEUTROPHILS NFR BLD: 67.6 % (ref 38–73)
NRBC BLD-RTO: 0 /100 WBC
PHOSPHATE SERPL-MCNC: 3.3 MG/DL (ref 2.7–4.5)
PLATELET # BLD AUTO: 302 K/UL (ref 150–450)
PMV BLD AUTO: 9.5 FL (ref 9.2–12.9)
POCT GLUCOSE: 116 MG/DL (ref 70–110)
POCT GLUCOSE: 130 MG/DL (ref 70–110)
POTASSIUM SERPL-SCNC: 3.2 MMOL/L (ref 3.5–5.1)
PROT SERPL-MCNC: 4.7 G/DL (ref 6–8.4)
RBC # BLD AUTO: 2.88 M/UL (ref 4.6–6.2)
SODIUM SERPL-SCNC: 139 MMOL/L (ref 136–145)
WBC # BLD AUTO: 8.81 K/UL (ref 3.9–12.7)

## 2025-03-11 PROCEDURE — 63600175 PHARM REV CODE 636 W HCPCS: Performed by: STUDENT IN AN ORGANIZED HEALTH CARE EDUCATION/TRAINING PROGRAM

## 2025-03-11 PROCEDURE — 86677 HELICOBACTER PYLORI ANTIBODY: CPT | Performed by: STUDENT IN AN ORGANIZED HEALTH CARE EDUCATION/TRAINING PROGRAM

## 2025-03-11 PROCEDURE — 84100 ASSAY OF PHOSPHORUS: CPT | Performed by: STUDENT IN AN ORGANIZED HEALTH CARE EDUCATION/TRAINING PROGRAM

## 2025-03-11 PROCEDURE — 25000003 PHARM REV CODE 250: Performed by: INTERNAL MEDICINE

## 2025-03-11 PROCEDURE — 25000003 PHARM REV CODE 250: Performed by: STUDENT IN AN ORGANIZED HEALTH CARE EDUCATION/TRAINING PROGRAM

## 2025-03-11 PROCEDURE — 85025 COMPLETE CBC W/AUTO DIFF WBC: CPT | Performed by: INTERNAL MEDICINE

## 2025-03-11 PROCEDURE — 99232 SBSQ HOSP IP/OBS MODERATE 35: CPT | Mod: ,,,

## 2025-03-11 PROCEDURE — 36415 COLL VENOUS BLD VENIPUNCTURE: CPT | Performed by: STUDENT IN AN ORGANIZED HEALTH CARE EDUCATION/TRAINING PROGRAM

## 2025-03-11 PROCEDURE — 83735 ASSAY OF MAGNESIUM: CPT | Performed by: STUDENT IN AN ORGANIZED HEALTH CARE EDUCATION/TRAINING PROGRAM

## 2025-03-11 PROCEDURE — 80053 COMPREHEN METABOLIC PANEL: CPT | Performed by: STUDENT IN AN ORGANIZED HEALTH CARE EDUCATION/TRAINING PROGRAM

## 2025-03-11 RX ORDER — PANTOPRAZOLE SODIUM 40 MG/10ML
INJECTION, POWDER, LYOPHILIZED, FOR SOLUTION INTRAVENOUS
Qty: 90 EACH | Refills: 3 | Status: SHIPPED | OUTPATIENT
Start: 2025-03-11 | End: 2025-03-11 | Stop reason: HOSPADM

## 2025-03-11 RX ORDER — PANTOPRAZOLE SODIUM 40 MG/1
TABLET, DELAYED RELEASE ORAL
Qty: 90 TABLET | Refills: 3 | Status: SHIPPED | OUTPATIENT
Start: 2025-03-11 | End: 2025-03-11

## 2025-03-11 RX ORDER — POTASSIUM CHLORIDE 20 MEQ/1
20 TABLET, EXTENDED RELEASE ORAL DAILY
Start: 2025-03-11 | End: 2025-03-24

## 2025-03-11 RX ORDER — POTASSIUM CHLORIDE 750 MG/1
30 CAPSULE, EXTENDED RELEASE ORAL
Status: DISCONTINUED | OUTPATIENT
Start: 2025-03-11 | End: 2025-03-11 | Stop reason: HOSPADM

## 2025-03-11 RX ORDER — POTASSIUM CHLORIDE 750 MG/1
30 CAPSULE, EXTENDED RELEASE ORAL ONCE
Status: DISCONTINUED | OUTPATIENT
Start: 2025-03-11 | End: 2025-03-11

## 2025-03-11 RX ORDER — FUROSEMIDE 20 MG/1
20 TABLET ORAL DAILY
Start: 2025-03-11 | End: 2025-03-24

## 2025-03-11 RX ORDER — PANTOPRAZOLE SODIUM 40 MG/1
TABLET, DELAYED RELEASE ORAL
Qty: 90 TABLET | Refills: 3 | Status: SHIPPED | OUTPATIENT
Start: 2025-03-11 | End: 2026-05-10

## 2025-03-11 RX ORDER — LORAZEPAM 0.5 MG/1
0.5 TABLET ORAL 2 TIMES DAILY
Start: 2025-03-11

## 2025-03-11 RX ADMIN — PANTOPRAZOLE SODIUM 40 MG: 40 INJECTION, POWDER, FOR SOLUTION INTRAVENOUS at 09:03

## 2025-03-11 RX ADMIN — POTASSIUM CHLORIDE 30 MEQ: 750 CAPSULE, EXTENDED RELEASE ORAL at 12:03

## 2025-03-11 RX ADMIN — FOLIC ACID-PYRIDOXINE-CYANOCOBALAMIN TAB 2.5-25-2 MG 1 TABLET: 2.5-25-2 TAB at 09:03

## 2025-03-11 RX ADMIN — ATORVASTATIN CALCIUM 40 MG: 40 TABLET, FILM COATED ORAL at 09:03

## 2025-03-11 RX ADMIN — LAMOTRIGINE 200 MG: 25 TABLET ORAL at 09:03

## 2025-03-11 RX ADMIN — PIPERACILLIN SODIUM AND TAZOBACTAM SODIUM 4.5 G: 4; .5 INJECTION, POWDER, FOR SOLUTION INTRAVENOUS at 02:03

## 2025-03-11 RX ADMIN — PIPERACILLIN SODIUM AND TAZOBACTAM SODIUM 4.5 G: 4; .5 INJECTION, POWDER, FOR SOLUTION INTRAVENOUS at 09:03

## 2025-03-11 RX ADMIN — Medication 100 MG: at 09:03

## 2025-03-11 RX ADMIN — LORAZEPAM 0.5 MG: 0.5 TABLET ORAL at 09:03

## 2025-03-11 NOTE — ANESTHESIA POSTPROCEDURE EVALUATION
Anesthesia Post Evaluation    Patient: Ye Partida    Procedure(s) Performed: Procedure(s) (LRB):  EGD (ESOPHAGOGASTRODUODENOSCOPY) (N/A)    Final Anesthesia Type: general      Patient location during evaluation: PACU  Patient participation: Yes- Able to Participate  Level of consciousness: awake and alert  Post-procedure vital signs: reviewed and stable  Pain management: adequate  Airway patency: patent    PONV status at discharge: No PONV  Anesthetic complications: no      Cardiovascular status: blood pressure returned to baseline  Respiratory status: unassisted  Follow-up not needed.            Event Time   Out of Recovery 03/10/2025 16:52:07         Pain/Pawan Score: Pain Rating Prior to Med Admin: 0 (3/10/2025  1:48 PM)  Pawan Score: 10 (3/10/2025  3:30 PM)

## 2025-03-11 NOTE — PROGRESS NOTES
[facility]  Gastroenterology  Progress Note    Patient Name: Ye Partida  MRN: [id number]  Admission Date: 3/8/2025  Hospital Length of Stay: 3 days  Code Status: Full Code   Attending Provider: Ivory Thorpe MD  Consulting Provider: KYE Larson  Primary Care Physician: Cameron Tran MD  Principal Problem: Gastrointestinal bleed        Subjective:     Interval History: GI consulted for hematemesis. S/p EGD yesterday showed Non-obstructing Schatzki ring and marginal ulcer with bleeding visible vessel identified clipped x 2. Two units RBCs yesterday. Hgb stable. No further reports of hematemesis, blood in stool, melena. Denies BM overnight.     Review of Systems   Constitutional:  Negative for fatigue.   Gastrointestinal:  Negative for abdominal pain, blood in stool, diarrhea, nausea and vomiting.   Neurological:  Negative for dizziness and weakness.     Objective:     Vital Signs (Most Recent):  Temp: 98.3 °F (36.8 °C) (03/11/25 0732)  Pulse: 91 (03/11/25 1057)  Resp: 16 (03/11/25 0732)  BP: (!) 100/59 (03/11/25 0732)  SpO2: 98 % (03/11/25 0732) Vital Signs (24h Range):  Temp:  [97.9 °F (36.6 °C)-99.3 °F (37.4 °C)] 98.3 °F (36.8 °C)  Pulse:  [] 91  Resp:  [12-18] 16  SpO2:  [95 %-100 %] 98 %  BP: ()/(54-79) 100/59     Weight: 60.3 kg (132 lb 15 oz) (03/09/25 0557)  Body mass index is 20.21 kg/m².      Intake/Output Summary (Last 24 hours) at 3/11/2025 1117  Last data filed at 3/11/2025 0919  Gross per 24 hour   Intake 1159.03 ml   Output 3250 ml   Net -2090.97 ml       Lines/Drains/Airways       Peripheral Intravenous Line  Duration                  Peripheral IV - Single Lumen 03/08/25 18 G Right Antecubital 3 days         Peripheral IV - Single Lumen 03/08/25 1932 18 G Anterior;Distal;Left Forearm 2 days                     Physical Exam  Vitals and nursing note reviewed.   Constitutional:       General: He is not in acute distress.     Appearance: Normal  appearance. He is not ill-appearing.   HENT:      Head: Normocephalic and atraumatic.   Eyes:      Extraocular Movements: Extraocular movements intact.   Cardiovascular:      Rate and Rhythm: Normal rate.   Pulmonary:      Effort: Pulmonary effort is normal. No respiratory distress.   Abdominal:      General: There is no distension.      Tenderness: There is no abdominal tenderness. There is no guarding.   Musculoskeletal:         General: Normal range of motion.   Skin:     General: Skin is warm and dry.   Neurological:      Mental Status: He is alert and oriented to person, place, and time.          Significant Labs:  Recent Lab Results         03/11/25  0734   03/11/25  0404        Albumin   2.8       ALP   72       ALT   15       Anion Gap   8       AST   22       Baso #   0.06       Basophil %   0.7       BILIRUBIN TOTAL   0.9  Comment: For infants and newborns, interpretation of results should be based  on gestational age, weight and in agreement with clinical  observations.    Premature Infant recommended reference ranges:  Up to 24 hours.............<8.0 mg/dL  Up to 48 hours............<12.0 mg/dL  3-5 days..................<15.0 mg/dL  6-29 days.................<15.0 mg/dL         BUN   19       Calcium   7.7       Chloride   104       CO2   27       Creatinine   1.0       Differential Method   Automated       eGFR   >60.0       Eos #   0.3       Eos %   3.0       Glucose   98       Gran # (ANC)   6.0       Gran %   67.6       Hematocrit   26.3       Hemoglobin   8.9       Immature Grans (Abs)   0.07  Comment: Mild elevation in immature granulocytes is non specific and   can be seen in a variety of conditions including stress response,   acute inflammation, trauma and pregnancy. Correlation with other   laboratory and clinical findings is essential.         Immature Granulocytes   0.8       Lymph #   1.6       Lymph %   18.0       Magnesium    2.0       MCH   30.9       MCHC   33.8       MCV   91        Mono #   0.9       Mono %   9.9       MPV   9.5       nRBC   0       Phosphorus Level   3.3       Platelet Count   302       POCT Glucose 130         Potassium   3.2       PROTEIN TOTAL   4.7       RBC   2.88       RDW   15.0       Sodium   139       WBC   8.81                 Significant Imaging:  Imaging results within the past 24 hours have been reviewed.  Assessment/Plan:     GI  * Gastrointestinal bleed  Ye Partida is a 69 y.o. male with history of prior smoker (quit 40s) with CAD s/p recent CABG 02/21/2025 on ASA/plavix, HFreF (40-50% 02/2025) HTN, HLD, pyloric stenosis as a child, and epilepsy who presents for worsening fatigue and weakness over the last ~1x week, coffee ground emesis immediately prior to presentation. GI consulted for upper gi bleed. Hb 7.6, leukocytosis on broad spec abx, and BUN elevated and rising. HD stable.     Problem List:  Concern for UGIB with elevated BUN and Hb 7.6    Findings:       The examined esophagus was normal.        A non-obstructing Schatzki ring was found in the lower third of the        esophagus.        The Z-line was regular and was found 37 cm from the incisors.        Evidence of a gastrojejunostomy was found in the gastric body. This        was characterized by congestion, friable mucosa and ulceration. This        area of ulceration was consistent with a marginal ulcer. An adherent        clot was found overlying the ulcer. Once the clot was removed, a        visible vessel was noted. For hemostasis, two hemostatic clips were        successfully placed (MR conditional). Clip : [employer]. There was no bleeding at the end of the procedure.        A severe stenosis was found at the pylorus. This was non-traversed.        The examined jejunal limb was normal.   Impression:            - Normal esophagus.                          - Non-obstructing Schatzki ring.                          - Z-line regular, 37 cm from the incisors.                           - A gastrojejunostomy was found, characterized by                          friable mucosa, congestion and ulceration.                          Marginal ulcer with bleeding visible vessel                          identified. Two Clips (MR conditional) were placed                          and hemostasis achieved.. Clip :                          [employer].                          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                          - Normal examined Jejunal limb.                          - No specimens collected.     Recommendations:  - Use Protonix (pantoprazole) 40 mg PO BID for 2 months  - Check h. pylori serology and treat with 14 days of bisumuth quadruple therapy if positive.   - Avoid NSAIDs such as ibuprofen, motrin, alieve, and goodys/bc powder.   - Repeat upper endoscopy in 2 months to check healing. - will place scheduling request with endo schedulers  - Resume Plavix (clopidogrel) at prior dose                       Thank you for involving us in the care of Ye Partida. Please call with any additional questions, concerns or changes in the patient's clinical status. We will sign off.       Case discussed with Dr Heller   Thank you for your consult. I will sign off. Please contact us if you have any additional questions.    Shelby Barrera, MANNYP-C  Gastroenterology  [facility]

## 2025-03-11 NOTE — PLAN OF CARE
Problem: Adult Inpatient Plan of Care  Goal: Plan of Care Review  Outcome: Progressing  Goal: Patient-Specific Goal (Individualized)  Outcome: Progressing  Goal: Absence of Hospital-Acquired Illness or Injury  Outcome: Progressing  Goal: Optimal Comfort and Wellbeing  Outcome: Progressing  Goal: Readiness for Transition of Care  Outcome: Progressing     Problem: Gastrointestinal Bleeding  Goal: Optimal Coping with Acute Illness  Outcome: Progressing  Goal: Hemostasis  Outcome: Progressing     Problem: Wound  Goal: Optimal Coping  Outcome: Progressing  Goal: Optimal Functional Ability  Outcome: Progressing  Goal: Absence of Infection Signs and Symptoms  Outcome: Progressing  Goal: Improved Oral Intake  Outcome: Progressing  Goal: Optimal Pain Control and Function  Outcome: Progressing  Goal: Skin Health and Integrity  Outcome: Progressing  Goal: Optimal Wound Healing  Outcome: Progressing     Problem: Fall Injury Risk  Goal: Absence of Fall and Fall-Related Injury  Outcome: Progressing

## 2025-03-11 NOTE — NURSING
Patient had an uneventful night, denies any pain or distress, VSS, skin warm dry resp even unlabored. No s/s of active bleeding noted or reported. Chest incision clean dry open to air. Sternal precautions in progress, Care plan on going.

## 2025-03-11 NOTE — SUBJECTIVE & OBJECTIVE
Subjective:     Interval History: GI consulted for hematemesis. S/p EGD yesterday showed Non-obstructing Schatzki ring and marginal ulcer with bleeding visible vessel identified clipped x 2. Two units RBCs yesterday. Hgb stable. No further reports of hematemesis, blood in stool, melena. Denies BM overnight.     Review of Systems   Constitutional:  Negative for fatigue.   Gastrointestinal:  Negative for abdominal pain, blood in stool, diarrhea, nausea and vomiting.   Neurological:  Negative for dizziness and weakness.     Objective:     Vital Signs (Most Recent):  Temp: 98.3 °F (36.8 °C) (03/11/25 0732)  Pulse: 91 (03/11/25 1057)  Resp: 16 (03/11/25 0732)  BP: (!) 100/59 (03/11/25 0732)  SpO2: 98 % (03/11/25 0732) Vital Signs (24h Range):  Temp:  [97.9 °F (36.6 °C)-99.3 °F (37.4 °C)] 98.3 °F (36.8 °C)  Pulse:  [] 91  Resp:  [12-18] 16  SpO2:  [95 %-100 %] 98 %  BP: ()/(54-79) 100/59     Weight: 60.3 kg (132 lb 15 oz) (03/09/25 0557)  Body mass index is 20.21 kg/m².      Intake/Output Summary (Last 24 hours) at 3/11/2025 1117  Last data filed at 3/11/2025 0919  Gross per 24 hour   Intake 1159.03 ml   Output 3250 ml   Net -2090.97 ml       Lines/Drains/Airways       Peripheral Intravenous Line  Duration                  Peripheral IV - Single Lumen 03/08/25 18 G Right Antecubital 3 days         Peripheral IV - Single Lumen 03/08/25 1932 18 G Anterior;Distal;Left Forearm 2 days                     Physical Exam  Vitals and nursing note reviewed.   Constitutional:       General: He is not in acute distress.     Appearance: Normal appearance. He is not ill-appearing.   HENT:      Head: Normocephalic and atraumatic.   Eyes:      Extraocular Movements: Extraocular movements intact.   Cardiovascular:      Rate and Rhythm: Normal rate.   Pulmonary:      Effort: Pulmonary effort is normal. No respiratory distress.   Abdominal:      General: There is no distension.      Tenderness: There is no abdominal  tenderness. There is no guarding.   Musculoskeletal:         General: Normal range of motion.   Skin:     General: Skin is warm and dry.   Neurological:      Mental Status: He is alert and oriented to person, place, and time.          Significant Labs:  Recent Lab Results         03/11/25  0734   03/11/25  0404        Albumin   2.8       ALP   72       ALT   15       Anion Gap   8       AST   22       Baso #   0.06       Basophil %   0.7       BILIRUBIN TOTAL   0.9  Comment: For infants and newborns, interpretation of results should be based  on gestational age, weight and in agreement with clinical  observations.    Premature Infant recommended reference ranges:  Up to 24 hours.............<8.0 mg/dL  Up to 48 hours............<12.0 mg/dL  3-5 days..................<15.0 mg/dL  6-29 days.................<15.0 mg/dL         BUN   19       Calcium   7.7       Chloride   104       CO2   27       Creatinine   1.0       Differential Method   Automated       eGFR   >60.0       Eos #   0.3       Eos %   3.0       Glucose   98       Gran # (ANC)   6.0       Gran %   67.6       Hematocrit   26.3       Hemoglobin   8.9       Immature Grans (Abs)   0.07  Comment: Mild elevation in immature granulocytes is non specific and   can be seen in a variety of conditions including stress response,   acute inflammation, trauma and pregnancy. Correlation with other   laboratory and clinical findings is essential.         Immature Granulocytes   0.8       Lymph #   1.6       Lymph %   18.0       Magnesium    2.0       MCH   30.9       MCHC   33.8       MCV   91       Mono #   0.9       Mono %   9.9       MPV   9.5       nRBC   0       Phosphorus Level   3.3       Platelet Count   302       POCT Glucose 130         Potassium   3.2       PROTEIN TOTAL   4.7       RBC   2.88       RDW   15.0       Sodium   139       WBC   8.81                 Significant Imaging:  Imaging results within the past 24 hours have been reviewed.

## 2025-03-11 NOTE — PLAN OF CARE
[facility] - Acute Medical Stepdown  Discharge Final Note    Discharge Plan A and Plan B have been determined by review of patient's clinical status, future medical and therapeutic needs, and coverage/benefits for post-acute care in coordination with multidisciplinary team members.     Primary Care Provider: Cameron Tran MD    Expected Discharge Date: 3/11/2025    Final Discharge Note (most recent)       Final Note - 03/11/25 1551          Final Note    Assessment Type Final Discharge Note     Anticipated Discharge Disposition Home or Self Care     What phone number can be called within the next 1-3 days to see how you are doing after discharge? [phone]        Post-Acute Status    Post-Acute Authorization Other     Other Status No Post-Acute Service Needs     Discharge Delays None known at this time                     Future Appointments   Date Time Provider Department Center   3/24/2025 11:15 AM [facility] [id number] 485 LB LIMIT [facility]RAY OP [facility]   3/24/2025 11:30 AM EKG, APPT [facility] EKG [facility]   3/24/2025 12:00 PM Flavio Leal MD [facility] CARDVAS [facility] sent a message to Team 2 CHW to scheduled PCP follow up       Patient medically cleared to dc home, patient is cleared form CM stand point.  Patient dc home with family and no needs.  Patients spouse transported home via   private vehicle.             Albertina Richard RN  Case Management  [facility]  [phone]

## 2025-03-11 NOTE — PLAN OF CARE
Pt being d/c home with wife.  AVS discussed, pt states understanding. PIV x2 d/c     Problem: Adult Inpatient Plan of Care  Goal: Plan of Care Review  Outcome: Met  Goal: Patient-Specific Goal (Individualized)  Outcome: Met  Goal: Absence of Hospital-Acquired Illness or Injury  Outcome: Met  Goal: Optimal Comfort and Wellbeing  Outcome: Met  Goal: Readiness for Transition of Care  Outcome: Met     Problem: Gastrointestinal Bleeding  Goal: Optimal Coping with Acute Illness  Outcome: Met  Goal: Hemostasis  Outcome: Met     Problem: Wound  Goal: Optimal Coping  Outcome: Met  Goal: Optimal Functional Ability  Outcome: Met  Goal: Absence of Infection Signs and Symptoms  Outcome: Met  Goal: Improved Oral Intake  Outcome: Met  Goal: Optimal Pain Control and Function  Outcome: Met  Goal: Skin Health and Integrity  Outcome: Met  Goal: Optimal Wound Healing  Outcome: Met     Problem: Fall Injury Risk  Goal: Absence of Fall and Fall-Related Injury  Outcome: Met

## 2025-03-11 NOTE — ASSESSMENT & PLAN NOTE
Ye Partida is a 69 y.o. male with history of prior smoker (quit 40s) with CAD s/p recent CABG 02/21/2025 on ASA/plavix, HFreF (40-50% 02/2025) HTN, HLD, pyloric stenosis as a child, and epilepsy who presents for worsening fatigue and weakness over the last ~1x week, coffee ground emesis immediately prior to presentation. GI consulted for upper gi bleed. Hb 7.6, leukocytosis on broad spec abx, and BUN elevated and rising. HD stable.     Problem List:  Concern for UGIB with elevated BUN and Hb 7.6    Findings:       The examined esophagus was normal.        A non-obstructing Schatzki ring was found in the lower third of the        esophagus.        The Z-line was regular and was found 37 cm from the incisors.        Evidence of a gastrojejunostomy was found in the gastric body. This        was characterized by congestion, friable mucosa and ulceration. This        area of ulceration was consistent with a marginal ulcer. An adherent        clot was found overlying the ulcer. Once the clot was removed, a        visible vessel was noted. For hemostasis, two hemostatic clips were        successfully placed (MR conditional). Clip : Spiration. There was no bleeding at the end of the procedure.        A severe stenosis was found at the pylorus. This was non-traversed.        The examined jejunal limb was normal.   Impression:            - Normal esophagus.                          - Non-obstructing Schatzki ring.                          - Z-line regular, 37 cm from the incisors.                          - A gastrojejunostomy was found, characterized by                          friable mucosa, congestion and ulceration.                          Marginal ulcer with bleeding visible vessel                          identified. Two Clips (MR conditional) were placed                          and hemostasis achieved.. Clip :                          Spiration.                           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                          - Normal examined Jejunal limb.                          - No specimens collected.     Recommendations:  - Use Protonix (pantoprazole) 40 mg PO BID for 2 months  - Check h. pylori serology and treat with 14 days of bisumuth quadruple therapy if positive.   - Avoid NSAIDs such as ibuprofen, motrin, alieve, and goodys/bc powder.   - Repeat upper endoscopy in 2 months to check healing. - will place scheduling request with endo schedulers  - Resume Plavix (clopidogrel) at prior dose                       Thank you for involving us in the care of Ye Partida. Please call with any additional questions, concerns or changes in the patient's clinical status. We will sign off.

## 2025-03-12 LAB
BLD PROD TYP BPU: NORMAL
BLOOD UNIT EXPIRATION DATE: NORMAL
BLOOD UNIT TYPE CODE: 6200
BLOOD UNIT TYPE: NORMAL
CODING SYSTEM: NORMAL
CROSSMATCH INTERPRETATION: NORMAL
DISPENSE STATUS: NORMAL
TRANS ERYTHROCYTES VOL PATIENT: NORMAL ML

## 2025-03-13 ENCOUNTER — RESULTS FOLLOW-UP (OUTPATIENT)
Dept: GASTROENTEROLOGY | Facility: HOSPITAL | Age: 70
End: 2025-03-13

## 2025-03-13 LAB — H PYLORI IGG SERPL QL IA: NEGATIVE

## 2025-03-14 ENCOUNTER — TELEPHONE (OUTPATIENT)
Dept: CARDIAC REHAB | Facility: CLINIC | Age: 70
End: 2025-03-14
Payer: MEDICARE

## 2025-03-14 LAB
BACTERIA BLD CULT: NORMAL
BACTERIA BLD CULT: NORMAL

## 2025-03-17 ENCOUNTER — TELEPHONE (OUTPATIENT)
Dept: CARDIOTHORACIC SURGERY | Facility: CLINIC | Age: 70
End: 2025-03-17
Payer: MEDICARE

## 2025-03-17 NOTE — TELEPHONE ENCOUNTER
Returned call to pt who stated that he was experiencing SOB with ambulation, but that it is improving.  Inquired if pt has a pulse oximeter, which he confirmed that he does, informing me that his current oxygen saturation is 96%.  Pt stated that he monitors his oxygen level and it remains at 95% and above.  Inquired if pt is experiencing swelling to his feet, ankles, and lower legs, which pt denied.  Pt stated that his weight has decreased to 127 lbs, with decreased appetite.  Informed pt that he can attempt to eat smaller meals throughout the day and incorporate Boost with protein as a supplement which he verbalized understanding to.  Pt stated that his b/p is 109/79 to 114/83, and his current HR is 92.  Informed JESSICA Kirkland, of pt's SOB, which pt stated is improving, and oxygen saturation, which she verbalized understanding to, stating that pt can incorporate a daily oral iron supplement with stool softener to prevent constipation, due to pt's recent GIB.  Informed pt that per JESSICA Pelayo, he can incorporate a daily oral iron supplement with stool softener to prevent constipation, which he verbalized understanding to.  Pt instructed to contact Dr. Leal's office with any questions or concerns which he verbalized understanding to.    ----- Message from Sulema sent at 3/17/2025 10:16 AM CDT -----  Type:  Needs Medical AdviceWho Called: wife of Mr Partida Symptoms (please be specific): loss weight, having issues breathing and not moving around  How long has patient had these symptoms:  couple weeksWould the patient rather a call back or a response via [facility]? Call Best Call Back Number:  [phone] Information: please call

## 2025-03-18 ENCOUNTER — TELEPHONE (OUTPATIENT)
Dept: CARDIOLOGY | Facility: CLINIC | Age: 70
End: 2025-03-18
Payer: MEDICARE

## 2025-03-20 ENCOUNTER — HOSPITAL ENCOUNTER (OUTPATIENT)
Facility: HOSPITAL | Age: 70
Discharge: HOME | End: 2025-03-21
Attending: STUDENT IN AN ORGANIZED HEALTH CARE EDUCATION/TRAINING PROGRAM
Payer: MEDICARE

## 2025-03-20 DIAGNOSIS — R00.0 TACHYCARDIA: ICD-10-CM

## 2025-03-20 DIAGNOSIS — K92.2 GI BLEED: ICD-10-CM

## 2025-03-20 DIAGNOSIS — K92.1 BLACK STOOL: ICD-10-CM

## 2025-03-20 DIAGNOSIS — K92.1 MELENA: Primary | ICD-10-CM

## 2025-03-20 PROBLEM — I95.1 ORTHOSTASIS: Status: ACTIVE | Noted: 2025-03-20

## 2025-03-20 LAB
ABO + RH BLD: NORMAL
ALBUMIN SERPL BCP-MCNC: 3.6 G/DL (ref 3.5–5.2)
ALP SERPL-CCNC: 127 U/L (ref 40–150)
ALT SERPL W/O P-5'-P-CCNC: 20 U/L (ref 10–44)
ANION GAP SERPL CALC-SCNC: 9 MMOL/L (ref 8–16)
AST SERPL-CCNC: 19 U/L (ref 10–40)
BASOPHILS # BLD AUTO: 0.04 K/UL (ref 0–0.2)
BASOPHILS NFR BLD: 0.5 % (ref 0–1.9)
BILIRUB SERPL-MCNC: 0.6 MG/DL (ref 0.1–1)
BLD GP AB SCN CELLS X3 SERPL QL: NORMAL
BUN SERPL-MCNC: 13 MG/DL (ref 8–23)
CALCIUM SERPL-MCNC: 9.3 MG/DL (ref 8.7–10.5)
CHLORIDE SERPL-SCNC: 105 MMOL/L (ref 95–110)
CO2 SERPL-SCNC: 26 MMOL/L (ref 23–29)
CREAT SERPL-MCNC: 1.1 MG/DL (ref 0.5–1.4)
DIFFERENTIAL METHOD BLD: ABNORMAL
EOSINOPHIL # BLD AUTO: 0.4 K/UL (ref 0–0.5)
EOSINOPHIL NFR BLD: 4.2 % (ref 0–8)
ERYTHROCYTE [DISTWIDTH] IN BLOOD BY AUTOMATED COUNT: 15.3 % (ref 11.5–14.5)
EST. GFR  (NO RACE VARIABLE): >60 ML/MIN/1.73 M^2
GLUCOSE SERPL-MCNC: 114 MG/DL (ref 70–110)
HCT VFR BLD AUTO: 37.2 % (ref 40–54)
HGB BLD-MCNC: 11.7 G/DL (ref 14–18)
IMM GRANULOCYTES # BLD AUTO: 0.04 K/UL (ref 0–0.04)
IMM GRANULOCYTES NFR BLD AUTO: 0.5 % (ref 0–0.5)
LYMPHOCYTES # BLD AUTO: 1 K/UL (ref 1–4.8)
LYMPHOCYTES NFR BLD: 11.8 % (ref 18–48)
MCH RBC QN AUTO: 29.4 PG (ref 27–31)
MCHC RBC AUTO-ENTMCNC: 31.5 G/DL (ref 32–36)
MCV RBC AUTO: 94 FL (ref 82–98)
MONOCYTES # BLD AUTO: 0.7 K/UL (ref 0.3–1)
MONOCYTES NFR BLD: 8.4 % (ref 4–15)
NEUTROPHILS # BLD AUTO: 6.4 K/UL (ref 1.8–7.7)
NEUTROPHILS NFR BLD: 74.6 % (ref 38–73)
NRBC BLD-RTO: 0 /100 WBC
OHS QRS DURATION: 82 MS
OHS QTC CALCULATION: 435 MS
PLATELET # BLD AUTO: 461 K/UL (ref 150–450)
PMV BLD AUTO: 9.5 FL (ref 9.2–12.9)
POTASSIUM SERPL-SCNC: 4.3 MMOL/L (ref 3.5–5.1)
PROT SERPL-MCNC: 6.4 G/DL (ref 6–8.4)
RBC # BLD AUTO: 3.98 M/UL (ref 4.6–6.2)
SODIUM SERPL-SCNC: 140 MMOL/L (ref 136–145)
SPECIMEN OUTDATE: NORMAL
WBC # BLD AUTO: 8.61 K/UL (ref 3.9–12.7)

## 2025-03-20 PROCEDURE — 96374 THER/PROPH/DIAG INJ IV PUSH: CPT

## 2025-03-20 PROCEDURE — 99285 EMERGENCY DEPT VISIT HI MDM: CPT | Mod: 25

## 2025-03-20 PROCEDURE — 96376 TX/PRO/DX INJ SAME DRUG ADON: CPT

## 2025-03-20 PROCEDURE — 63600175 PHARM REV CODE 636 W HCPCS

## 2025-03-20 PROCEDURE — 80053 COMPREHEN METABOLIC PANEL: CPT

## 2025-03-20 PROCEDURE — 93010 ELECTROCARDIOGRAM REPORT: CPT | Mod: ,,, | Performed by: INTERNAL MEDICINE

## 2025-03-20 PROCEDURE — 99900035 HC TECH TIME PER 15 MIN (STAT)

## 2025-03-20 PROCEDURE — 86850 RBC ANTIBODY SCREEN: CPT

## 2025-03-20 PROCEDURE — G0378 HOSPITAL OBSERVATION PER HR: HCPCS

## 2025-03-20 PROCEDURE — 25000003 PHARM REV CODE 250

## 2025-03-20 PROCEDURE — 85025 COMPLETE CBC W/AUTO DIFF WBC: CPT

## 2025-03-20 PROCEDURE — 93005 ELECTROCARDIOGRAM TRACING: CPT

## 2025-03-20 PROCEDURE — 94761 N-INVAS EAR/PLS OXIMETRY MLT: CPT

## 2025-03-20 PROCEDURE — 94799 UNLISTED PULMONARY SVC/PX: CPT

## 2025-03-20 RX ORDER — TAMSULOSIN HYDROCHLORIDE 0.4 MG/1
0.4 CAPSULE ORAL DAILY
Status: DISCONTINUED | OUTPATIENT
Start: 2025-03-21 | End: 2025-03-21 | Stop reason: HOSPADM

## 2025-03-20 RX ORDER — TALC
6 POWDER (GRAM) TOPICAL NIGHTLY PRN
Status: DISCONTINUED | OUTPATIENT
Start: 2025-03-20 | End: 2025-03-21 | Stop reason: HOSPADM

## 2025-03-20 RX ORDER — ATORVASTATIN CALCIUM 40 MG/1
40 TABLET, FILM COATED ORAL DAILY
Status: DISCONTINUED | OUTPATIENT
Start: 2025-03-21 | End: 2025-03-21 | Stop reason: HOSPADM

## 2025-03-20 RX ORDER — LAMOTRIGINE 100 MG/1
200 TABLET ORAL DAILY
Status: DISCONTINUED | OUTPATIENT
Start: 2025-03-21 | End: 2025-03-21 | Stop reason: HOSPADM

## 2025-03-20 RX ORDER — METOPROLOL TARTRATE 25 MG/1
25 TABLET, FILM COATED ORAL 2 TIMES DAILY
Status: DISCONTINUED | OUTPATIENT
Start: 2025-03-20 | End: 2025-03-21 | Stop reason: HOSPADM

## 2025-03-20 RX ORDER — SODIUM CHLORIDE, SODIUM LACTATE, POTASSIUM CHLORIDE, CALCIUM CHLORIDE 600; 310; 30; 20 MG/100ML; MG/100ML; MG/100ML; MG/100ML
INJECTION, SOLUTION INTRAVENOUS CONTINUOUS
Status: ACTIVE | OUTPATIENT
Start: 2025-03-20 | End: 2025-03-20

## 2025-03-20 RX ORDER — PANTOPRAZOLE SODIUM 40 MG/10ML
40 INJECTION, POWDER, LYOPHILIZED, FOR SOLUTION INTRAVENOUS 2 TIMES DAILY
Status: DISCONTINUED | OUTPATIENT
Start: 2025-03-20 | End: 2025-03-21 | Stop reason: HOSPADM

## 2025-03-20 RX ORDER — LORAZEPAM 0.5 MG/1
0.5 TABLET ORAL 2 TIMES DAILY
Status: DISCONTINUED | OUTPATIENT
Start: 2025-03-20 | End: 2025-03-21 | Stop reason: HOSPADM

## 2025-03-20 RX ORDER — PANTOPRAZOLE SODIUM 40 MG/10ML
80 INJECTION, POWDER, LYOPHILIZED, FOR SOLUTION INTRAVENOUS
Status: COMPLETED | OUTPATIENT
Start: 2025-03-20 | End: 2025-03-20

## 2025-03-20 RX ADMIN — PANTOPRAZOLE SODIUM 80 MG: 40 INJECTION, POWDER, LYOPHILIZED, FOR SOLUTION INTRAVENOUS at 09:03

## 2025-03-20 RX ADMIN — PANTOPRAZOLE SODIUM 40 MG: 40 INJECTION, POWDER, LYOPHILIZED, FOR SOLUTION INTRAVENOUS at 09:03

## 2025-03-20 RX ADMIN — LORAZEPAM 0.5 MG: 0.5 TABLET ORAL at 09:03

## 2025-03-20 RX ADMIN — SODIUM CHLORIDE, POTASSIUM CHLORIDE, SODIUM LACTATE AND CALCIUM CHLORIDE: 600; 310; 30; 20 INJECTION, SOLUTION INTRAVENOUS at 01:03

## 2025-03-20 NOTE — NURSING
Pt arrived to unit from ED  via wheelchair transport. Pt is able to make needs known. Spouse is at bedside. IV is in pt's left ac with LR at 125. Telemetry in place. RN oriented patient to room. RN answered all of pt's  questions.

## 2025-03-20 NOTE — ED NOTES
Received report from ELLIE Paul    LOC: The patient is awake, alert and aware of environment with an appropriate affect, the patient is oriented x 3 and speaking appropriately.   APPEARANCE: Patient appears comfortable and in no acute distress, patient is clean and well groomed.  SKIN: The skin is warm and dry, color consistent with ethnicity, patient has normal skin turgor and moist mucus membranes, skin intact, no breakdown or bruising noted.   MUSCULOSKELETAL: Patient moving all extremities spontaneously, no swelling noted. Pt is ambulatory, denies current dizziness.  RESPIRATORY: Airway is open and patent, respirations are spontaneous, patient has a normal effort and rate, no accessory muscle. Denies current SOB, currently on RA, no labored breathing noted. Pt does report SOB with exertion since CABG surgery, states he is in PT for it.  CARDIAC: Pt placed on cardiac monitor. Patient has a normal rate and regular rhythm, no edema noted, capillary refill < 3 seconds. Denies CP.   GASTRO: Soft and tender to palpation, no distention noted. Denies N/V, reports melena at home.  : Pt denies any pain or frequency with urination.  NEURO: Pt opens eyes spontaneously, behavior appropriate to situation, follows commands, facial expression symmetrical, bilateral hand grasp equal and even, purposeful motor response noted, normal sensation in all extremities when touched with a finger.

## 2025-03-20 NOTE — ASSESSMENT & PLAN NOTE
Present on admission  Gentle fluids given HFrEF and recent CABG  Repeat orthostatics prior to discharge

## 2025-03-20 NOTE — ED PROVIDER NOTES
Encounter Date: 3/20/2025       History     Chief Complaint   Patient presents with    Melena     X 2 days ago, recent CABG in feb and had melena after in March, had EGD this month and was told it was fixed. Now having melena again. +takes plavix     Mr. Ye Partida is a 69M with PMH CAD s/p CABG on 2/21/25 on plavix, HFrEF (40-50% 02/2025), HTN, HLD, and GI bleed who is presenting for melena. Patient states that he had a dark black stool this morning. He began feeling light headed and dizzy two days ago and noticed melena this morning. He recently was admitted on 3/8 for an upper GI bleed. EGD at that time showed a marginal ulcer that was clipped. He states that the bleeding stopped for a few weeks until this morning. He is currently on plavix due to his recent CABG. He stopped the plavix for a few days after his GI Bleed, however, he restarted it on 3/11 after discussion with his CT surgeon. His last dose of plavix was this morning. Of note, he did have a gastric bypass surgery previously. He denies any chest pain, shortness of breath, or vomiting. He states that he feels very weak.       Review of patient's allergies indicates:   Allergen Reactions    Antihistamines - alkylamine Other (See Comments)     Heart race    Codeine Other (See Comments)     Heart race     Past Medical History:   Diagnosis Date    Hyperlipidemia     Pyloric stenosis, congenital     s/p repair    Seizures      Past Surgical History:   Procedure Laterality Date    CORONARY ANGIOGRAPHY Right 2/19/2025    Procedure: ANGIOGRAM, CORONARY ARTERY;  Surgeon: Ari Latham III, MD;  Location: [facility] CATH LAB;  Service: Cardiology;  Laterality: Right;    CORONARY ARTERY BYPASS GRAFT (CABG) N/A 2/21/2025    Procedure: CORONARY ARTERY BYPASS GRAFT (CABG);  Surgeon: Flavio Leal MD;  Location: [facility] OR [address] [place] [address];  Service: Cardiovascular;  Laterality: N/A;  CABG x 2  LIMA TO LAD  SVG TO RCA    ENDOSCOPIC HARVEST OF VEIN Left 2/21/2025    Procedure:  HARVEST-VEIN-ENDOVASCULAR;  Surgeon: Flavio Leal MD;  Location: [facility] OR 2ND FLR;  Service: Cardiovascular;  Laterality: Left;  VEIN START-1043  VEIN OUT-1112  VEIN PREP START-1113  VEIN READY-1131    ESOPHAGOGASTRODUODENOSCOPY N/A 3/10/2025    Procedure: EGD (ESOPHAGOGASTRODUODENOSCOPY);  Surgeon: Oneil Key MD;  Location: [facility] ENDO (2ND FLR);  Service: Endoscopy;  Laterality: N/A;    pyloric stenosis      TONSILLECTOMY       Family History   Problem Relation Name Age of Onset    Cancer Mother          breast ca    Arthritis Mother      Hypertension Mother      Cancer Father          prostate ca    Alcohol abuse Sister      Hypertension Sister      Obesity Sister      Heart disease Sister      Alcohol abuse Brother      Hypertension Brother      Obesity Brother      Kidney disease Son      Diabetes Son      Crohn's disease Son       Social History[1]  Review of Systems   Constitutional:  Positive for activity change and fatigue.   Respiratory:  Negative for shortness of breath.    Cardiovascular:  Negative for chest pain.   Gastrointestinal:  Positive for blood in stool. Negative for abdominal pain and nausea.   Genitourinary:  Negative for difficulty urinating.   Skin:  Negative for rash.       Physical Exam     Initial Vitals [03/20/25 0826]   BP Pulse Resp Temp SpO2   104/69 (!) 118 18 98.2 °F (36.8 °C) 98 %      MAP       --         Physical Exam    Constitutional: He appears well-developed.   HENT:   Head: Normocephalic and atraumatic.   Eyes: Conjunctivae are normal.   Cardiovascular:  Normal rate and regular rhythm.           Pulmonary/Chest: Breath sounds normal. No respiratory distress. He has no wheezes.   Abdominal: Abdomen is soft. Bowel sounds are normal. He exhibits no distension. There is no abdominal tenderness.   Genitourinary:    Genitourinary Comments: Rectal exam shows external hemorrhoids     Musculoskeletal:         General: No edema.     Neurological: He is alert and oriented to person,  place, and time.   Skin: Skin is warm.   Psychiatric: He has a normal mood and affect.         ED Course   Procedures  Labs Reviewed   CBC W/ AUTO DIFFERENTIAL - Abnormal       Result Value    WBC 8.61      RBC 3.98 (*)     Hemoglobin 11.7 (*)     Hematocrit 37.2 (*)     MCV 94      MCH 29.4      MCHC 31.5 (*)     RDW 15.3 (*)     Platelets 461 (*)     MPV 9.5      Immature Granulocytes 0.5      Gran # (ANC) 6.4      Immature Grans (Abs) 0.04      Lymph # 1.0      Mono # 0.7      Eos # 0.4      Baso # 0.04      nRBC 0      Gran % 74.6 (*)     Lymph % 11.8 (*)     Mono % 8.4      Eosinophil % 4.2      Basophil % 0.5      Differential Method Automated     COMPREHENSIVE METABOLIC PANEL - Abnormal    Sodium 140      Potassium 4.3      Chloride 105      CO2 26      Glucose 114 (*)     BUN 13      Creatinine 1.1      Calcium 9.3      Total Protein 6.4      Albumin 3.6      Total Bilirubin 0.6      Alkaline Phosphatase 127      AST 19      ALT 20      eGFR >60.0      Anion Gap 9     TYPE & SCREEN          Imaging Results    None          Medications   pantoprazole injection 80 mg (80 mg Intravenous Given 3/20/25 0937)     Medical Decision Making  69M with PMH CAD s/p CABG on 2/21/25 on plavix, HFrEF (40-50% 02/2025), HTN, HLD, and GI bleed who is presenting for melena. Patient states that he had a dark black stool this morning. Recent history of upper GI bleed two weeks ago from marginal ulcer that was clipped. Patient on plavix. Differential diagnosis includes UGI Bleed from marginal ulcer, demetrice silverman tear, and peptic ulcer disease. Initial blood pressure 104/69. IV PPI administered. CBC WNL with Hgb at 11.7. CMP unremarkable. Patient will be admitted to hospital medicine for further workup of melena.     Amount and/or Complexity of Data Reviewed  Labs: ordered.  ECG/medicine tests:  Decision-making details documented in ED Course.    Risk  Prescription drug management.  Decision regarding hospitalization.                ED Course as of 25 1024   Thu Mar 20, 2025   0901 EKG 12-lead  Sinus tachycardia.  No acute ischemia.  Some artifact movement noted in lateral lead.  Intervals are normal. [AC]   0910 .seniorresidentattest   [BP]   0911 I oversaw the [place] resident's evaluation of the patient and have evaluated the patient myself. The case was discussed with the [place] resident and the staff attending in the Emergency Department.  Please see the primary note for further details regarding the patient's history, physical exam, any pertinent imaging or lab results or consultations.  I have reviewed and agree with the documented findings, interpretation of studies and results, and plan of care.   Summary of care and any exceptions are noted below.     Pt presenting with report of melena, hx of GI bleeds on plavix, required transfusion and admission  for the same. No abdominal pain or tenderness on exam, no gross blood on CHITO. Admission to  for observation and GI consult. [BP]      ED Course User Index  [AC] Titi Proctor, DO  [BP] Giovanni Soto MD                           Clinical Impression:  Final diagnoses:  [R00.0] Tachycardia  [K92.2] GI bleed  [K92.1] Melena (Primary)          ED Disposition Condition    Admit Stable                    [1]   Social History  Tobacco Use    Smoking status: Former     Current packs/day: 0.00     Types: Cigarettes     Quit date:      Years since quittin.2    Smokeless tobacco: Never   Substance Use Topics    Alcohol use: No    Drug use: No        Beulah Chandra MD  Resident  25 1020

## 2025-03-20 NOTE — HPI
Ye Partida is a 69 year old male whose PMH includes CAD s/p CABG on 2.21.25 on plavix, HFrEF (40-50% 02/2025), HTN, HLD, and recent GI bleed secondary to marginal ulcer with bleeding visible vessel identified s/p clipping.  He received 2U PRBCs during that admission.  He was discharged 3.11.25 and resumed plavix, but not ASA per his CTS recommendations.  He began taking Fe supplements 3.18.25 and the following day had a dark stool and this morning had another stool that was solid but close to black which prompted return to the ED.  On arrival vitals were stable.  He denies any cp, shob, fever, cough, chills, abd pain, bright red blood per rectum, tarry stools, or hematemesis.  Hgb 11.7 on arrival today which is much improved from Hgb on discharge which was 8.9.  He was given IV protonix and admitted to medicine for further management.

## 2025-03-20 NOTE — SUBJECTIVE & OBJECTIVE
Past Medical History:   Diagnosis Date    Hyperlipidemia     Pyloric stenosis, congenital     s/p repair    Seizures        Past Surgical History:   Procedure Laterality Date    CORONARY ANGIOGRAPHY Right 2/19/2025    Procedure: ANGIOGRAM, CORONARY ARTERY;  Surgeon: Ari Latham III, MD;  Location: [facility] CATH LAB;  Service: Cardiology;  Laterality: Right;    CORONARY ARTERY BYPASS GRAFT (CABG) N/A 2/21/2025    Procedure: CORONARY ARTERY BYPASS GRAFT (CABG);  Surgeon: Flavio Leal MD;  Location: [facility] OR [address] [place] [address];  Service: Cardiovascular;  Laterality: N/A;  CABG x 2  LIMA TO LAD  SVG TO RCA    ENDOSCOPIC HARVEST OF VEIN Left 2/21/2025    Procedure: HARVEST-VEIN-ENDOVASCULAR;  Surgeon: Flavio Leal MD;  Location: [facility] OR [address] [place] [address];  Service: Cardiovascular;  Laterality: Left;  VEIN START-1043  VEIN OUT-1112  VEIN PREP START-1113  VEIN READY-1131    ESOPHAGOGASTRODUODENOSCOPY N/A 3/10/2025    Procedure: EGD (ESOPHAGOGASTRODUODENOSCOPY);  Surgeon: Oenil Key MD;  Location: [facility] ENDO ([address] [place] [address]);  Service: Endoscopy;  Laterality: N/A;    pyloric stenosis      TONSILLECTOMY         Review of patient's allergies indicates:   Allergen Reactions    Antihistamines - alkylamine Other (See Comments)     Heart race    Codeine Other (See Comments)     Heart race       No current facility-administered medications on file prior to encounter.     Current Outpatient Medications on File Prior to Encounter   Medication Sig    acetaminophen (TYLENOL) 500 MG tablet Take 2 tablets (1,000 mg total) by mouth every 6 (six) hours as needed for Pain. (Patient taking differently: Take 500-1,000 mg by mouth every 6 (six) hours as needed for Pain.)    aspirin 81 MG Chew Chew and swallow 1 tablet (81 mg total) by mouth once daily.    atorvastatin (LIPITOR) 40 MG tablet Take 1 tablet (40 mg total) by mouth once daily.    clopidogreL (PLAVIX) 75 mg tablet Take 1 tablet (75 mg total) by mouth once daily.    DULCOLAX, BISACODYL, ORAL Take 1  tablet by mouth once daily.    folic acid-vit B6-vit B12 2.5-25-2 mg (FOLBIC OR EQUIV) 2.5-25-2 mg Tab Take 1 tablet by mouth once daily.    lamotrigine (LAMICTAL) 200 MG tablet Take 1 tablet by mouth  daily    LORazepam (ATIVAN) 0.5 MG tablet Take 1 tablet (0.5 mg total) by mouth 2 (two) times daily.    pantoprazole (PROTONIX) 40 MG tablet Take 1 tablet (40 mg total) by mouth 2 (two) times daily for 60 days, THEN 1 tablet (40 mg total) once daily.    furosemide (LASIX) 20 MG tablet Take 1 tablet (20 mg total) by mouth once daily. for 7 days (Patient not taking: Reported on 3/20/2025)    metoprolol tartrate (LOPRESSOR) 25 MG tablet Take 1 tablet (25 mg total) by mouth 2 (two) times daily. (Patient not taking: Reported on 3/20/2025)    [DISCONTINUED] cyanocobalamin (VITAMIN B-12) 100 MCG tablet Take 100 mcg by mouth once daily.    [DISCONTINUED] melatonin (MELATIN) 3 mg tablet Take 2 tablets (6 mg total) by mouth nightly as needed for Insomnia.    [DISCONTINUED] tamsulosin (FLOMAX) 0.4 mg Cap Take 1 capsule (0.4 mg total) by mouth once daily.     Family History       Problem Relation (Age of Onset)    Alcohol abuse Sister, Brother    Arthritis Mother    Cancer Mother, Father    Crohn's disease Son    Diabetes Son    Heart disease Sister    Hypertension Mother, Sister, Brother    Kidney disease Son    Obesity Sister, Brother          Tobacco Use    Smoking status: Former     Current packs/day: 0.00     Types: Cigarettes     Quit date:      Years since quittin.2    Smokeless tobacco: Never   Substance and Sexual Activity    Alcohol use: No    Drug use: No    Sexual activity: Yes     Partners: Female     Review of Systems   Constitutional:  Negative for diaphoresis, fatigue and fever.   HENT:  Negative for dental problem, drooling, ear discharge, mouth sores and sore throat.    Eyes:  Negative for photophobia, pain and redness.   Respiratory:  Negative for cough, chest tightness and shortness of breath.     Cardiovascular:  Negative for chest pain and palpitations.   Gastrointestinal:  Negative for abdominal pain, blood in stool, constipation, diarrhea, nausea and vomiting.        Dark stools   Genitourinary:  Negative for decreased urine volume, difficulty urinating, dysuria, hematuria and urgency.   Musculoskeletal:  Negative for arthralgias, gait problem and neck pain.   Skin:  Negative for color change and rash.   Neurological:  Negative for dizziness, syncope, facial asymmetry, numbness and headaches.   Psychiatric/Behavioral:  Negative for confusion.      Objective:     Vital Signs (Most Recent):  Temp: 97.6 °F (36.4 °C) (03/20/25 1528)  Pulse: 81 (03/20/25 1528)  Resp: 18 (03/20/25 1528)  BP: 107/73 (03/20/25 1528)  SpO2: 98 % (03/20/25 1528) Vital Signs (24h Range):  Temp:  [97.6 °F (36.4 °C)-98.7 °F (37.1 °C)] 97.6 °F (36.4 °C)  Pulse:  [] 81  Resp:  [14-18] 18  SpO2:  [98 %-100 %] 98 %  BP: ()/(57-97) 107/73     Weight: 59.9 kg (132 lb)  Body mass index is 20.07 kg/m².     Physical Exam  Constitutional:       Appearance: Normal appearance. He is not ill-appearing.   HENT:      Head: Normocephalic and atraumatic.      Right Ear: External ear normal.      Left Ear: External ear normal.      Nose: Nose normal. No congestion or rhinorrhea.      Mouth/Throat:      Mouth: Mucous membranes are moist.      Pharynx: Oropharynx is clear.   Eyes:      Extraocular Movements: Extraocular movements intact.      Conjunctiva/sclera: Conjunctivae normal.      Pupils: Pupils are equal, round, and reactive to light.   Cardiovascular:      Rate and Rhythm: Normal rate and regular rhythm.      Heart sounds: No murmur heard.  Pulmonary:      Effort: Pulmonary effort is normal. No respiratory distress.      Breath sounds: Normal breath sounds. No wheezing.   Abdominal:      General: Abdomen is flat. Bowel sounds are normal. There is no distension.      Palpations: Abdomen is soft.      Tenderness: There is no  abdominal tenderness.   Musculoskeletal:         General: No swelling. Normal range of motion.      Cervical back: No rigidity or tenderness.   Skin:     General: Skin is warm and dry.      Coloration: Skin is not jaundiced.   Neurological:      General: No focal deficit present.      Mental Status: He is alert and oriented to person, place, and time.              CRANIAL NERVES     CN III, IV, VI   Pupils are equal, round, and reactive to light.       Significant Labs: All pertinent labs within the past 24 hours have been reviewed.    Significant Imaging: I have reviewed all pertinent imaging results/findings within the past 24 hours.

## 2025-03-20 NOTE — Clinical Note
Diagnosis: Melena [[id number]]   Future Attending Provider: SHANNA GRAJEDA [[id number]]   Reason for IP Medical Treatment  (Clinical interventions that can only be accomplished in the IP setting? ) :: GIB   Plans for Post-Acute care--if anticipated (pick the single best option):: A. No post acute care anticipated at this time   Special Needs:: No Special Needs [1]

## 2025-03-20 NOTE — ASSESSMENT & PLAN NOTE
Black stools suspect secondary Fe supplementation especially given improved Hgb and hemodynamic stability  However given recent hx of GI bleed will closely monitor Hgb and hemodynamics  Ok to hold plavix  Ok for CLD; NPO at [facility]  If he has drop in hemoglobin, change in hemodynamics, or melenic stools will consult GI for possible repeat endoscopy  IV protonix BID  Goal directed resuscitation, transfuse for Hgb<8 given CAD hx

## 2025-03-20 NOTE — ED NOTES
Assumed care of the patient. Report received from ELLIE Valdez. Pt in hospital gown, side rails up X2, bed low and locked. One family/visitors at bedside at this time. Pt denies any complaints or needs.

## 2025-03-20 NOTE — ED NOTES
Tele box 0205 applied to pt. Tech in war room states able to see pt on monitor, rhythm normal sinus with HR 85.

## 2025-03-20 NOTE — PHARMACY MED REC
"Admission Medication History     The home medication history was taken by Gaurang Amaral.    You may go to "Admission" then "Reconcile Home Medications" tabs to review and/or act upon these items.     The home medication list has been updated by the Pharmacy department.   Please read ALL comments highlighted in yellow.   Please address this information as you see fit.    Feel free to contact us if you have any questions or require assistance.      The medications listed below were removed from the home medication list. Please reorder if appropriate:  Patient reports no longer taking the following medication(s):  CYANOCBALAMIN 100 MCG  FUROSEMIDE 20,MG  METOPROLOL 25 MG  TAMSULOSIN 0.4 MG    Medications listed below were obtained from: Patient/family and Analytic software- [employer]  Current Outpatient Medications on File Prior to Encounter   Medication Sig    acetaminophen (TYLENOL) 500 MG tablet   Take 500-1,000 mg by mouth every 6 (six) hours as needed for Pain.    aspirin 81 MG Chew Chew and swallow 1 tablet (81 mg total) by mouth once daily.      atorvastatin (LIPITOR) 40 MG tablet   Take 1 tablet (40 mg total) by mouth once daily.    clopidogreL (PLAVIX) 75 mg tablet   Take 1 tablet (75 mg total) by mouth once daily.    DULCOLAX, BISACODYL, ORAL   Take 1 tablet by mouth once daily.    folic acid-vit B6-vit B12 2.5-25-2 mg (FOLBIC OR EQUIV) 2.5-25-2 mg Tab   Take 1 tablet by mouth once daily.    lamotrigine (LAMICTAL) 200 MG tablet   Take 1 tablet by mouth daily.    LORazepam (ATIVAN) 0.5 MG tablet Take 1 tablet (0.5 mg total) by mouth 2 (two) times daily.      pantoprazole (PROTONIX) 40 MG tablet Take 1 tablet (40 mg total) by mouth 2 (two) times daily for 60 days, THEN 1 tablet (40 mg total) once daily.      furosemide (LASIX) 20 MG tablet   Take 1 tablet (20 mg total) by mouth once daily. for 7 days (Patient not taking: Reported on 3/20/2025)    metoprolol tartrate (LOPRESSOR) 25 MG tablet Take 1 tablet (25 mg " total) by mouth 2 (two) times daily. (Patient not taking: Reported on 3/20/2025)                 Gaurang Amaral  EXT [id number]                  .

## 2025-03-20 NOTE — ED NOTES
Pt presents to ED via personal transport w/ his wife w/ c/o melena. Pt AAOx4 + able to provide full hx. Reports melena x2 days. Reports CABG in February + started on Plavix following surgery. Reports recent admission for bleeding + received 4 units during admission + seen by Endoscopy and had procedure on ulcer at that time. States bleeding stopped after procedure but is now having black, tarry stools. Endorsing generalized weakness and intermittent dizziness, however, does not feel as bad as last visit to ED. Took Plavix this morning. Denies chest pain, shortness of breath, headache, abdominal pain, fever, chills, dysuria.    Patient identifiers for Ye Partida 69 y.o. male checked and correct.  Chief Complaint   Patient presents with    Melena     X 2 days ago, recent CABG in feb and had melena after in March, had EGD this month and was told it was fixed. Now having melena again. +takes plavix     Past Medical History:   Diagnosis Date    Hyperlipidemia     Pyloric stenosis, congenital     s/p repair    Seizures      Allergies reported:   Review of patient's allergies indicates:   Allergen Reactions    Antihistamines - alkylamine Other (See Comments)     Heart race    Codeine Other (See Comments)     Heart race     Most recent vital signs:  Vitals:    03/20/25 0852   BP: (!) 125/97   Pulse: 94   Resp: 14   Temp:

## 2025-03-20 NOTE — H&P
[facility] - [facility] Dept  [facility] Medicine  History & Physical    Patient Name: Ye Partida  MRN: [id number]  Patient Class: OP- Observation  Admission Date: 3/20/2025  Attending Physician: Braxton Correa DO  Primary Care Provider: Cameron Tran MD         Patient information was obtained from patient and ER records.     Subjective:     Principal Problem:<principal problem not specified>    Chief Complaint:   Chief Complaint   Patient presents with    Melena     X 2 days ago, recent CABG in feb and had melena after in March, had EGD this month and was told it was fixed. Now having melena again. +takes plavix        HPI: Ye Partida is a 69 year old male whose PMH includes CAD s/p CABG on 2.21.25 on plavix, HFrEF (40-50% 02/2025), HTN, HLD, and recent GI bleed secondary to marginal ulcer with bleeding visible vessel identified s/p clipping.  He received 2U PRBCs during that admission.  He was discharged 3.11.25 and resumed plavix, but not ASA per his CTS recommendations.  He began taking Fe supplements 3.18.25 and the following day had a dark stool and this morning had another stool that was solid but close to black which prompted return to the ED.  On arrival vitals were stable.  He denies any cp, shob, fever, cough, chills, abd pain, bright red blood per rectum, tarry stools, or hematemesis.  Hgb 11.7 on arrival today which is much improved from Hgb on discharge which was 8.9.  He was given IV protonix and admitted to medicine for further management.    Past Medical History:   Diagnosis Date    Hyperlipidemia     Pyloric stenosis, congenital     s/p repair    Seizures        Past Surgical History:   Procedure Laterality Date    CORONARY ANGIOGRAPHY Right 2/19/2025    Procedure: ANGIOGRAM, CORONARY ARTERY;  Surgeon: Ari Latham III, MD;  Location: [facility] CATH LAB;  Service: Cardiology;  Laterality: Right;    CORONARY ARTERY BYPASS GRAFT (CABG) N/A 2/21/2025    Procedure: CORONARY ARTERY BYPASS  GRAFT (CABG);  Surgeon: Flavio Leal MD;  [facility];  Service: Cardiovascular;  Laterality: N/A;  CABG x 2  LIMA TO LAD  SVG TO RCA    ENDOSCOPIC HARVEST OF VEIN Left 2/21/2025    Procedure: HARVEST-VEIN-ENDOVASCULAR;  Surgeon: Flavio Leal MD;  Location: [facility] OR 2ND FLR;  Service: Cardiovascular;  Laterality: Left;  VEIN START-1043  VEIN OUT-1112  VEIN PREP START-1113  VEIN READY-1131    ESOPHAGOGASTRODUODENOSCOPY N/A 3/10/2025    Procedure: EGD (ESOPHAGOGASTRODUODENOSCOPY);  Surgeon: Oneil Key MD;  Location: [facility] ENDO (2ND FLR);  Service: Endoscopy;  Laterality: N/A;    pyloric stenosis      TONSILLECTOMY         Review of patient's allergies indicates:   Allergen Reactions    Antihistamines - alkylamine Other (See Comments)     Heart race    Codeine Other (See Comments)     Heart race       No current facility-administered medications on file prior to encounter.     Current Outpatient Medications on File Prior to Encounter   Medication Sig    acetaminophen (TYLENOL) 500 MG tablet Take 2 tablets (1,000 mg total) by mouth every 6 (six) hours as needed for Pain. (Patient taking differently: Take 500-1,000 mg by mouth every 6 (six) hours as needed for Pain.)    aspirin 81 MG Chew Chew and swallow 1 tablet (81 mg total) by mouth once daily.    atorvastatin (LIPITOR) 40 MG tablet Take 1 tablet (40 mg total) by mouth once daily.    clopidogreL (PLAVIX) 75 mg tablet Take 1 tablet (75 mg total) by mouth once daily.    DULCOLAX, BISACODYL, ORAL Take 1 tablet by mouth once daily.    folic acid-vit B6-vit B12 2.5-25-2 mg (FOLBIC OR EQUIV) 2.5-25-2 mg Tab Take 1 tablet by mouth once daily.    lamotrigine (LAMICTAL) 200 MG tablet Take 1 tablet by mouth  daily    LORazepam (ATIVAN) 0.5 MG tablet Take 1 tablet (0.5 mg total) by mouth 2 (two) times daily.    pantoprazole (PROTONIX) 40 MG tablet Take 1 tablet (40 mg total) by mouth 2 (two) times daily for 60 days, THEN 1 tablet (40 mg total) once daily.     furosemide (LASIX) 20 MG tablet Take 1 tablet (20 mg total) by mouth once daily. for 7 days (Patient not taking: Reported on 3/20/2025)    metoprolol tartrate (LOPRESSOR) 25 MG tablet Take 1 tablet (25 mg total) by mouth 2 (two) times daily. (Patient not taking: Reported on 3/20/2025)    [DISCONTINUED] cyanocobalamin (VITAMIN B-12) 100 MCG tablet Take 100 mcg by mouth once daily.    [DISCONTINUED] melatonin (MELATIN) 3 mg tablet Take 2 tablets (6 mg total) by mouth nightly as needed for Insomnia.    [DISCONTINUED] tamsulosin (FLOMAX) 0.4 mg Cap Take 1 capsule (0.4 mg total) by mouth once daily.     Family History       Problem Relation (Age of Onset)    Alcohol abuse Sister, Brother    Arthritis Mother    Cancer Mother, Father    Crohn's disease Son    Diabetes Son    Heart disease Sister    Hypertension Mother, Sister, Brother    Kidney disease Son    Obesity Sister, Brother          Tobacco Use    Smoking status: Former     Current packs/day: 0.00     Types: Cigarettes     Quit date:      Years since quittin.2    Smokeless tobacco: Never   Substance and Sexual Activity    Alcohol use: No    Drug use: No    Sexual activity: Yes     Partners: Female     Review of Systems   Constitutional:  Negative for diaphoresis, fatigue and fever.   HENT:  Negative for dental problem, drooling, ear discharge, mouth sores and sore throat.    Eyes:  Negative for photophobia, pain and redness.   Respiratory:  Negative for cough, chest tightness and shortness of breath.    Cardiovascular:  Negative for chest pain and palpitations.   Gastrointestinal:  Negative for abdominal pain, blood in stool, constipation, diarrhea, nausea and vomiting.        Dark stools   Genitourinary:  Negative for decreased urine volume, difficulty urinating, dysuria, hematuria and urgency.   Musculoskeletal:  Negative for arthralgias, gait problem and neck pain.   Skin:  Negative for color change and rash.   Neurological:  Negative for dizziness,  syncope, facial asymmetry, numbness and headaches.   Psychiatric/Behavioral:  Negative for confusion.      Objective:     Vital Signs (Most Recent):  Temp: 97.6 °F (36.4 °C) (03/20/25 1528)  Pulse: 81 (03/20/25 1528)  Resp: 18 (03/20/25 1528)  BP: 107/73 (03/20/25 1528)  SpO2: 98 % (03/20/25 1528) Vital Signs (24h Range):  Temp:  [97.6 °F (36.4 °C)-98.7 °F (37.1 °C)] 97.6 °F (36.4 °C)  Pulse:  [] 81  Resp:  [14-18] 18  SpO2:  [98 %-100 %] 98 %  BP: ()/(57-97) 107/73     Weight: 59.9 kg (132 lb)  Body mass index is 20.07 kg/m².     Physical Exam  Constitutional:       Appearance: Normal appearance. He is not ill-appearing.   HENT:      Head: Normocephalic and atraumatic.      Right Ear: External ear normal.      Left Ear: External ear normal.      Nose: Nose normal. No congestion or rhinorrhea.      Mouth/Throat:      Mouth: Mucous membranes are moist.      Pharynx: Oropharynx is clear.   Eyes:      Extraocular Movements: Extraocular movements intact.      Conjunctiva/sclera: Conjunctivae normal.      Pupils: Pupils are equal, round, and reactive to light.   Cardiovascular:      Rate and Rhythm: Normal rate and regular rhythm.      Heart sounds: No murmur heard.  Pulmonary:      Effort: Pulmonary effort is normal. No respiratory distress.      Breath sounds: Normal breath sounds. No wheezing.   Abdominal:      General: Abdomen is flat. Bowel sounds are normal. There is no distension.      Palpations: Abdomen is soft.      Tenderness: There is no abdominal tenderness.   Musculoskeletal:         General: No swelling. Normal range of motion.      Cervical back: No rigidity or tenderness.   Skin:     General: Skin is warm and dry.      Coloration: Skin is not jaundiced.   Neurological:      General: No focal deficit present.      Mental Status: He is alert and oriented to person, place, and time.              CRANIAL NERVES     CN III, IV, VI   Pupils are equal, round, and reactive to light.       Significant  Labs: All pertinent labs within the past 24 hours have been reviewed.    Significant Imaging: I have reviewed all pertinent imaging results/findings within the past 24 hours.  Assessment/Plan:     Black stool  Black stools suspect secondary Fe supplementation especially given improved Hgb and hemodynamic stability  However given recent hx of GI bleed will closely monitor Hgb and hemodynamics  Ok to hold plavix  Ok for CLD; NPO at [facility]  If he has drop in hemoglobin, change in hemodynamics, or melenic stools will consult GI for possible repeat endoscopy  IV protonix BID  Goal directed resuscitation, transfuse for Hgb<8 given CAD hx    Orthostasis  Present on admission  Gentle fluids given HFrEF and recent CABG  Repeat orthostatics prior to discharge    HFrEF (heart failure with reduced ejection fraction)  Resume GDMT as hemodynamics tolerate      Seizure disorder  Resume home regimen      S/P CABG (coronary artery bypass graft)  Hold plavix for now  Cont statin      CAD (coronary artery disease)  Hold plavix  Needs outpatient cardiolgy follow up    HTN (hypertension)  Resume home regimen as hemodynamics tolerate    HLD (hyperlipidemia)  Cont statin        VTE Risk Mitigation (From admission, onward)           Ordered     IP VTE HIGH RISK PATIENT  Once         03/20/25 1106     Place sequential compression device  Until discontinued         03/20/25 1106                       On 03/20/2025, patient should be placed in hospital observation services under my care.             Braxton Correa DO  Department of Hospital Medicine  [facility] - Emergency Dept

## 2025-03-21 ENCOUNTER — PATIENT MESSAGE (OUTPATIENT)
Dept: CARDIOTHORACIC SURGERY | Facility: CLINIC | Age: 70
End: 2025-03-21
Payer: MEDICARE

## 2025-03-21 VITALS
HEIGHT: 68 IN | RESPIRATION RATE: 20 BRPM | BODY MASS INDEX: 20 KG/M2 | SYSTOLIC BLOOD PRESSURE: 105 MMHG | HEART RATE: 123 BPM | WEIGHT: 132 LBS | DIASTOLIC BLOOD PRESSURE: 67 MMHG | TEMPERATURE: 98 F | OXYGEN SATURATION: 98 %

## 2025-03-21 LAB
ALBUMIN SERPL BCP-MCNC: 3.2 G/DL (ref 3.5–5.2)
ANION GAP SERPL CALC-SCNC: 10 MMOL/L (ref 8–16)
BUN SERPL-MCNC: 12 MG/DL (ref 8–23)
CALCIUM SERPL-MCNC: 8.7 MG/DL (ref 8.7–10.5)
CHLORIDE SERPL-SCNC: 104 MMOL/L (ref 95–110)
CO2 SERPL-SCNC: 24 MMOL/L (ref 23–29)
CREAT SERPL-MCNC: 1 MG/DL (ref 0.5–1.4)
ERYTHROCYTE [DISTWIDTH] IN BLOOD BY AUTOMATED COUNT: 15.1 % (ref 11.5–14.5)
EST. GFR  (NO RACE VARIABLE): >60 ML/MIN/1.73 M^2
GLUCOSE SERPL-MCNC: 93 MG/DL (ref 70–110)
HCT VFR BLD AUTO: 33 % (ref 40–54)
HGB BLD-MCNC: 10.2 G/DL (ref 14–18)
MAGNESIUM SERPL-MCNC: 2 MG/DL (ref 1.6–2.6)
MCH RBC QN AUTO: 28.6 PG (ref 27–31)
MCHC RBC AUTO-ENTMCNC: 30.9 G/DL (ref 32–36)
MCV RBC AUTO: 92 FL (ref 82–98)
PHOSPHATE SERPL-MCNC: 3.8 MG/DL (ref 2.7–4.5)
PLATELET # BLD AUTO: 417 K/UL (ref 150–450)
PMV BLD AUTO: 9.8 FL (ref 9.2–12.9)
POTASSIUM SERPL-SCNC: 4.6 MMOL/L (ref 3.5–5.1)
RBC # BLD AUTO: 3.57 M/UL (ref 4.6–6.2)
SODIUM SERPL-SCNC: 138 MMOL/L (ref 136–145)
WBC # BLD AUTO: 7.41 K/UL (ref 3.9–12.7)

## 2025-03-21 PROCEDURE — 83735 ASSAY OF MAGNESIUM: CPT

## 2025-03-21 PROCEDURE — 80069 RENAL FUNCTION PANEL: CPT

## 2025-03-21 PROCEDURE — 63600175 PHARM REV CODE 636 W HCPCS

## 2025-03-21 PROCEDURE — 25000003 PHARM REV CODE 250

## 2025-03-21 PROCEDURE — 36415 COLL VENOUS BLD VENIPUNCTURE: CPT

## 2025-03-21 PROCEDURE — 96376 TX/PRO/DX INJ SAME DRUG ADON: CPT

## 2025-03-21 PROCEDURE — 85027 COMPLETE CBC AUTOMATED: CPT

## 2025-03-21 PROCEDURE — G0378 HOSPITAL OBSERVATION PER HR: HCPCS

## 2025-03-21 RX ADMIN — LAMOTRIGINE 200 MG: 100 TABLET ORAL at 08:03

## 2025-03-21 RX ADMIN — ATORVASTATIN CALCIUM 40 MG: 40 TABLET, FILM COATED ORAL at 09:03

## 2025-03-21 RX ADMIN — LORAZEPAM 0.5 MG: 0.5 TABLET ORAL at 09:03

## 2025-03-21 RX ADMIN — PANTOPRAZOLE SODIUM 40 MG: 40 INJECTION, POWDER, LYOPHILIZED, FOR SOLUTION INTRAVENOUS at 09:03

## 2025-03-21 NOTE — PLAN OF CARE
[facility] - Observation 11H  Discharge Final Note    Primary Care Provider: Cameron Tran MD    Expected Discharge Date: 3/21/2025    Final Discharge Note (most recent)       Final Note - 03/21/25 1150          Final Note    Assessment Type Final Discharge Note     Anticipated Discharge Disposition Home or Self Care     Hospital Resources/Appts/Education Provided Provided patient/caregiver with written discharge plan information;Provided education on problems/symptoms using teachback;Appointments scheduled and added to AVS        Post-Acute Status    Discharge Delays None known at this time                   Future Appointments   Date Time Provider Department Center   3/24/2025 11:15 AM [facility] [id number] LB LIMIT NOM XRAY OP [facility]   3/24/2025 11:30 AM EKG, APPT [facility] EKG [facility]   3/24/2025 12:00 PM Flavio Leal MD [facility] CARDVAS [facility]   4/3/2025  9:00 AM Ari Latham III, MD [facility] CARDVAL [facility]   4/28/2025  4:00 PM Joshua Fong MD [facility] CARDIO [facility]     Pt discharged home with no services.   Loki Hurt, RN,BSN      Important Message from Medicare

## 2025-03-21 NOTE — NURSING
Pt discharged home with spouse via private car. IV dc'd. Telemetry dc'd. DC instructions reviewed with patient. Pt verbalized understanding.

## 2025-03-21 NOTE — NURSING
HPI:     Chief Complaint     Moles        HPI     Moles    Additional comments: New pt was seen in the [place] about a year ago and states she has very large moles all over shes concerned with states that she had a \"good Cancer\" in her left leg in 2014 [place] RN notified Dr Braxton Varela that patient's heart rate increased to 123 while standing and completing orthostatic BP. Per Dr. Sunny MD discussed with patient and ok for patient to discharge home.   defibrillator No    Reaction to local anesthetic No     Social History Narrative   None on file     Family History   Problem Relation Age of Onset   • Cancer Father 80     Colon   • Cancer Mother 71     Breast       PHYSICAL EXAM:   Physical Exam  A comple yearly. The patient will come sooner should they note  anything new or changing.   Proper sunblock use  of SPF 30 or higher, hats, discussed  Benign neoplasm of scalp and skin of neck  Benign neoplasm of skin of face  Benign neoplasm of skin of upper extre

## 2025-03-21 NOTE — PLAN OF CARE
Problem: Gastrointestinal Bleeding  Goal: Optimal Coping with Acute Illness  Outcome: Adequate for Care Transition  Goal: Hemostasis  Outcome: Adequate for Care Transition     Problem: Adult Inpatient Plan of Care  Goal: Plan of Care Review  Outcome: Adequate for Care Transition  Goal: Patient-Specific Goal (Individualized)  Outcome: Adequate for Care Transition  Goal: Absence of Hospital-Acquired Illness or Injury  Outcome: Adequate for Care Transition  Goal: Optimal Comfort and Wellbeing  Outcome: Adequate for Care Transition  Goal: Readiness for Transition of Care  Outcome: Adequate for Care Transition     Problem: Fall Injury Risk  Goal: Absence of Fall and Fall-Related Injury  Outcome: Adequate for Care Transition      No

## 2025-03-22 NOTE — PROGRESS NOTES
Discharge Summary  Hospital Medicine    Attending Provider on Discharge: Ivory Thorpe MD  [facility] Medicine Team: [facility] [place] [facility]  Date of Admission:  3/8/2025     Date of Discharge:  3/11/2025  Code status: Full Code    Active Hospital Problems    Diagnosis  POA    *Gastrointestinal bleed [K92.2]  Yes     Priority: 1 - High    Hemorrhagic shock [R57.8]  Yes     Priority: 2     Syncope [R55]  Yes     Priority: 2     Seizure disorder [G40.909]  Yes    HFrEF (heart failure with reduced ejection fraction) [I50.20]  Yes    Symptomatic anemia [D64.9]  Yes    Leukocytosis [D72.829]  Yes    Abnormal brain CT [R90.89]  Yes    CAD (coronary artery disease) [I25.10]  Yes    HTN (hypertension) [I10]  Yes    HLD (hyperlipidemia) [E78.5]  Yes      Resolved Hospital Problems   No resolved problems to display.     HPI  Ye Partida is a 69 y.o. male prior smoker (quit 40s) with CAD s/p recent CABG 02/21/2025 on ASA/plavix, HFreF (40-50% 02/2025) HTN, HLD, pyloric stenosis as a child, and epilepsy who presents for worsening fatigue and weakness over the last ~1x week. Pt and wife report he began to have low BP ~1x week ago (70/40s at home) and he felt very fatigued with generalized weakness. They discussed with CTS who recommended stopping lasix and metoprolol. Pt and wife report he felt better and had improved BP after that and even was able to exercise (walking) on 3/5 and 3/6 then felt fatigued the next day which he thought was because he overdid it with the exercise. On the day of presentation, pt's fatigue and generalized weakness had persisted and worsened to the point where the patient had very decreased appetite and felt too weak to get up by himself and at that time, pt and wife decided that he needed to go to the ED for evaluation. They also noted he had become diaphoretic and fatigued with any sort of exertion but denied any chest pain, dyspnea, palpitations, LE edema. They tried to help him get up and walk to  leave but pt was very weak and had to rest on the ground as his legs started to go out from under him and he began to have dark/coffee ground emesis so wife placed him on his side and called EMS. Pt reports feeling like he passed out briefly while he was down and woke up to hear his wife talking on the phone about him. Per EMS report, patient found hypotensive with SBP in the 60s. They noted that there was melena and coffee-ground emesis all over the home however pt and wife did not note any melena/hematochezia at that time or before. EMS started gave him about 600 cc of normal saline with improvement in BP.  Pt discussed with MICU and CTS in ED who recommended 1u blood transfusion and admission to . Pt denies heavy alcohol use currently or in the past including any issues with withdrawal.    Hospital Course  * Gastrointestinal bleed  Symptomatic anemia  Acute Blood loss anemia  Patient's hemorrhage is due to gastrointestinal bleed while on DAPT after CABG. Patients most recent Hgb, Hct, platelets, and INR are listed below.  Recent Labs     03/09/25  0441 03/09/25  1252 03/10/25  0019 03/10/25  0437 03/10/25  1119   HGB 7.7*   < > 6.5* 6.1* 7.1*   HCT 24.2*   < > 20.8* 19.3* 22.4*   *   < > 377 358 332   INR 1.1  --   --   --   --     < > = values in this interval not displayed.     Plan  - S/p transfusion 2 unit PRBCs  - H/H daily  dynamic instability  - GI consulted. EGD 3/10 showed gastric ulcer with bleeding from exposed vessel in the area of a previous gastrojejunostomy. Vessel was clipped.  Continue protonix 40 mg BID x 8 weeks.     Syncope  Attributed to acute UGI bleed  ECHO without contributory findings    Hemorrhagic shock  Would suspect hypovolemic/hemorrhagic in origin with GIB but could have septic element with leukocytosis however this could also be reactive/hemocentration and no obvious source at this time and s/p a dose of CTX in the ED. Improved with both IVF and now receiving  PRBC.    Supported by blood transfusion and IVFs  resolved    Abnormal brain CT  Noted small focal area of encephalomalacia in the right internal capsule, per radiology suspected possible lacunar type infarct but technically age indeterminate given no priors for comparison. Exam and hx in addition to improvement with tx are reassuring against acute CVA.  Plan  - Consider follow up non-emergent MRI.    Leukocytosis  Possibly reactive in setting of stress, no obvious source although could be GI in nature.  Currently on zosyn empirically  Stopped antibiotics on discharge    HFrEF (heart failure with reduced ejection fraction)  Mildly reduced EF. Evolemic to hypovolemic appearing on exam.    Plan  -BNP  Recent Labs   Lab 03/09/25  0441   *       - Hold BB and other meds due to hypotension at this time. Restart as able.  - will give furosemide 40 mg IV once    Results for orders placed during the hospital encounter of 02/19/25    Echo    Interpretation Summary    Left Ventricle: The left ventricle is normal in size. Ventricular mass is normal. Normal wall thickness. Regional wall motion abnormalities present in RCA and LCX distribution. See diagram for wall motion findings. There is mildly reduced systolic function with a visually estimated ejection fraction of 40 - 50%. Quantitated ejection fraction is 47%. Global longitudinal strain is -14.0%. There is normal diastolic function.    Right Ventricle: Normal right ventricular cavity size. Wall thickness is normal. Systolic function is normal.    Left Atrium: Left atrium is mildly dilated. The left atrium volume index is 36 mL/m2.    Mitral Valve: There is mild regurgitation with a centrally directed jet.    Aorta: Aortic root is normal in size measuring 3.68 cm. Ascending aorta is mildly dilated measuring 3.77 cm.    IVC/SVC: Normal venous pressure at 3 mmHg.      Seizure disorder  Plan  - Continue home lamictal and ativan.    CAD (coronary artery disease)  Patient  with known CAD s/p CABG 02/21/2025. He was on DAPT but in setting of bleeding will hold and only continue statin at this time. Monitor for S/Sx of angina/ACS. DAPT held during stay and restarted at discharge.     HTN (hypertension)  Patient's blood pressure range in the last 24 hours was: BP  Min: 91/59  Max: 112/59.The patient's inpatient anti-hypertensive regimen is listed below:  Current Antihypertensives       Plan  - With shock, will hold BP rx. Monitor and restart as indicated.    HLD (hyperlipidemia)  Plan  - Continue statin and hold ASA/plavix due to bleed.    Procedures: EGD as above    Consultants: GI consult    Discharge Medication List as of 3/11/2025  3:18 PM        CONTINUE these medications which have CHANGED    Details   furosemide (LASIX) 20 MG tablet Take 1 tablet (20 mg total) by mouth once daily. for 7 days, Starting Tue 3/11/2025, Until Tue 3/18/2025, No Print      LORazepam (ATIVAN) 0.5 MG tablet Take 1 tablet (0.5 mg total) by mouth 2 (two) times daily., Starting Tue 3/11/2025, No Print      potassium chloride SA (K-DUR,KLOR-CON) 20 MEQ tablet Take 1 tablet (20 mEq total) by mouth once daily. for 7 days, Starting Tue 3/11/2025, Until Tue 3/18/2025, No Print      folic acid-vit B6-vit B12 2.5-25-2 mg (FOLBIC OR EQUIV) 2.5-25-2 mg Tab Take 1 tablet by mouth once daily., Starting Wed 3/12/2025, Normal      pantoprazole (PROTONIX) 40 MG tablet Multiple Dosages:Starting Tue 3/11/2025, Until Fri 5/9/2025 at 2359, THEN Starting Sat 5/10/2025, Until Sat 5/9/2026 at 2359Take 1 tablet (40 mg total) by mouth 2 (two) times daily for 60 days, THEN 1 tablet (40 mg total) once daily., Normal           CONTINUE these medications which have NOT CHANGED    Details   acetaminophen (TYLENOL) 500 MG tablet Take 2 tablets (1,000 mg total) by mouth every 6 (six) hours as needed for Pain., Starting Tue 2/25/2025, Normal      aspirin 81 MG Chew Chew and swallow 1 tablet (81 mg total) by mouth once daily., Starting Wed  2/26/2025, Until Thu 2/26/2026, Normal      atorvastatin (LIPITOR) 40 MG tablet Take 1 tablet (40 mg total) by mouth once daily., Starting Wed 2/26/2025, Until Thu 2/26/2026, Normal      clopidogreL (PLAVIX) 75 mg tablet Take 1 tablet (75 mg total) by mouth once daily., Starting Wed 2/26/2025, Until Thu 2/26/2026, Normal      lamotrigine (LAMICTAL) 200 MG tablet Take 1 tablet by mouth  daily, Normal      metoprolol tartrate (LOPRESSOR) 25 MG tablet Take 1 tablet (25 mg total) by mouth 2 (two) times daily., Starting Tue 2/25/2025, Until Wed 2/25/2026, Normal      cyanocobalamin (VITAMIN B-12) 100 MCG tablet Take 100 mcg by mouth once daily., Historical Med      melatonin (MELATIN) 3 mg tablet Take 2 tablets (6 mg total) by mouth nightly as needed for Insomnia., Starting Tue 2/25/2025, Normal      tamsulosin (FLOMAX) 0.4 mg Cap Take 1 capsule (0.4 mg total) by mouth once daily., Starting Wed 2/26/2025, Until Thu 2/26/2026, Normal           STOP taking these medications       ascorbic acid (VITAMIN C) 1000 MG tablet Comments:   Reason for Stopping:         famotidine (PEPCID) 20 MG tablet Comments:   Reason for Stopping:         pantoprazole (PROTONIX) 40 mg injection Comments:   Reason for Stopping:               Discharge Diet:regular diet     Activity: activity as tolerated    Discharge Condition: Good    Disposition: Home or Self Care    Tests pending at the time of discharge: none      Time spent  on the discharge of the patient including review of hospital course with the patient. reviewing discharge medications and arranging follow-up care 35 min    Discharge examination Patient was seen and examined on the date of discharge and determined to be suitable for discharge.    Discharge plan     Future Appointments   Date Time Provider Department Center   3/24/2025 11:15 AM [facility] [id number] 485 LB LIMIT [facility] XRAY OP [facility]   3/24/2025 11:30 AM EKG, APPT [facility] EKG [facility]   3/24/2025 12:00 PM Flavio Leal MD [facility]  MCKENZIE Lozada   4/3/2025  9:00 AM Ari Latham III, MD [facility] CARDMEY [place]   4/28/2025  4:00 PM Joshua Fong MD [facility] CARDIO Scottie manjeet Thorpe MD

## 2025-03-22 NOTE — PT/OT/SLP DISCHARGE
Discharge Summary  Hospital Medicine    Attending Provider on Discharge: Iovry Thorpe MD  [facility] Medicine Team: [facility] HOSP MED D  Date of Admission:  3/8/2025     Date of Discharge:  3/11/2025  Code status: Full Code    Active Hospital Problems    Diagnosis  POA    *Gastrointestinal bleed [K92.2]  Yes     Priority: 1 - High    Hemorrhagic shock [R57.8]  Yes     Priority: 2     Syncope [R55]  Yes     Priority: 2     Seizure disorder [G40.909]  Yes    HFrEF (heart failure with reduced ejection fraction) [I50.20]  Yes    Symptomatic anemia [D64.9]  Yes    Leukocytosis [D72.829]  Yes    Abnormal brain CT [R90.89]  Yes    CAD (coronary artery disease) [I25.10]  Yes    HTN (hypertension) [I10]  Yes    HLD (hyperlipidemia) [E78.5]  Yes      Resolved Hospital Problems   No resolved problems to display.     HPI  Ye Partida is a 69 y.o. male prior smoker (quit 40s) with CAD s/p recent CABG 02/21/2025 on ASA/plavix, HFreF (40-50% 02/2025) HTN, HLD, pyloric stenosis as a child, and epilepsy who presents for worsening fatigue and weakness over the last ~1x week. Pt and wife report he began to have low BP ~1x week ago (70/40s at home) and he felt very fatigued with generalized weakness. They discussed with CTS who recommended stopping lasix and metoprolol. Pt and wife report he felt better and had improved BP after that and even was able to exercise (walking) on 3/5 and 3/6 then felt fatigued the next day which he thought was because he overdid it with the exercise. On the day of presentation, pt's fatigue and generalized weakness had persisted and worsened to the point where the patient had very decreased appetite and felt too weak to get up by himself and at that time, pt and wife decided that he needed to go to the ED for evaluation. They also noted he had become diaphoretic and fatigued with any sort of exertion but denied any chest pain, dyspnea, palpitations, LE edema. They tried to help him get up and walk to  leave but pt was very weak and had to rest on the ground as his legs started to go out from under him and he began to have dark/coffee ground emesis so wife placed him on his side and called EMS. Pt reports feeling like he passed out briefly while he was down and woke up to hear his wife talking on the phone about him. Per EMS report, patient found hypotensive with SBP in the 60s. They noted that there was melena and coffee-ground emesis all over the home however pt and wife did not note any melena/hematochezia at that time or before. EMS started gave him about 600 cc of normal saline with improvement in BP.  Pt discussed with MICU and CTS in ED who recommended 1u blood transfusion and admission to . Pt denies heavy alcohol use currently or in the past including any issues with withdrawal.    Hospital Course  * Gastrointestinal bleed  Symptomatic anemia  Acute Blood loss anemia  Patient's hemorrhage is due to gastrointestinal bleed while on DAPT after CABG. Patients most recent Hgb, Hct, platelets, and INR are listed below.  Recent Labs     03/09/25  0441 03/09/25  1252 03/10/25  0019 03/10/25  0437 03/10/25  1119   HGB 7.7*   < > 6.5* 6.1* 7.1*   HCT 24.2*   < > 20.8* 19.3* 22.4*   *   < > 377 358 332   INR 1.1  --   --   --   --     < > = values in this interval not displayed.     Plan  - S/p transfusion 2 unit PRBCs  - H/H daily  dynamic instability  - GI consulted. EGD 3/10 showed gastric ulcer with bleeding from exposed vessel in the area of a previous gastrojejunostomy. Vessel was clipped.  Continue protonix 40 mg BID x 8 weeks.     Syncope  Attributed to acute UGI bleed  ECHO without contributory findings    Hemorrhagic shock  Would suspect hypovolemic/hemorrhagic in origin with GIB but could have septic element with leukocytosis however this could also be reactive/hemocentration and no obvious source at this time and s/p a dose of CTX in the ED. Improved with both IVF and now receiving  PRBC.    Supported by blood transfusion and IVFs  resolved    Abnormal brain CT  Noted small focal area of encephalomalacia in the right internal capsule, per radiology suspected possible lacunar type infarct but technically age indeterminate given no priors for comparison. Exam and hx in addition to improvement with tx are reassuring against acute CVA.  Plan  - Consider follow up non-emergent MRI.    Leukocytosis  Possibly reactive in setting of stress, no obvious source although could be GI in nature.  Currently on zosyn empirically  Stopped antibiotics on discharge    HFrEF (heart failure with reduced ejection fraction)  Mildly reduced EF. Evolemic to hypovolemic appearing on exam.    Plan  -BNP  Recent Labs   Lab 03/09/25  0441   *       - Hold BB and other meds due to hypotension at this time. Restart as able.  - will give furosemide 40 mg IV once    Results for orders placed during the hospital encounter of 02/19/25    Echo    Interpretation Summary    Left Ventricle: The left ventricle is normal in size. Ventricular mass is normal. Normal wall thickness. Regional wall motion abnormalities present in RCA and LCX distribution. See diagram for wall motion findings. There is mildly reduced systolic function with a visually estimated ejection fraction of 40 - 50%. Quantitated ejection fraction is 47%. Global longitudinal strain is -14.0%. There is normal diastolic function.    Right Ventricle: Normal right ventricular cavity size. Wall thickness is normal. Systolic function is normal.    Left Atrium: Left atrium is mildly dilated. The left atrium volume index is 36 mL/m2.    Mitral Valve: There is mild regurgitation with a centrally directed jet.    Aorta: Aortic root is normal in size measuring 3.68 cm. Ascending aorta is mildly dilated measuring 3.77 cm.    IVC/SVC: Normal venous pressure at 3 mmHg.      Seizure disorder  Plan  - Continue home lamictal and ativan.    CAD (coronary artery disease)  Patient  with known CAD s/p CABG 02/21/2025. He was on DAPT but in setting of bleeding will hold and only continue statin at this time. Monitor for S/Sx of angina/ACS. DAPT held during stay and restarted at discharge.     HTN (hypertension)  Patient's blood pressure range in the last 24 hours was: BP  Min: 91/59  Max: 112/59.The patient's inpatient anti-hypertensive regimen is listed below:  Current Antihypertensives       Plan  - With shock, will hold BP rx. Monitor and restart as indicated.    HLD (hyperlipidemia)  Plan  - Continue statin and hold ASA/plavix due to bleed.    Procedures: EGD as above    Consultants: GI consult    Discharge Medication List as of 3/11/2025  3:18 PM        CONTINUE these medications which have CHANGED    Details   furosemide (LASIX) 20 MG tablet Take 1 tablet (20 mg total) by mouth once daily. for 7 days, Starting Tue 3/11/2025, Until Tue 3/18/2025, No Print      LORazepam (ATIVAN) 0.5 MG tablet Take 1 tablet (0.5 mg total) by mouth 2 (two) times daily., Starting Tue 3/11/2025, No Print      potassium chloride SA (K-DUR,KLOR-CON) 20 MEQ tablet Take 1 tablet (20 mEq total) by mouth once daily. for 7 days, Starting Tue 3/11/2025, Until Tue 3/18/2025, No Print      folic acid-vit B6-vit B12 2.5-25-2 mg (FOLBIC OR EQUIV) 2.5-25-2 mg Tab Take 1 tablet by mouth once daily., Starting Wed 3/12/2025, Normal      pantoprazole (PROTONIX) 40 MG tablet Multiple Dosages:Starting Tue 3/11/2025, Until Fri 5/9/2025 at 2359, THEN Starting Sat 5/10/2025, Until Sat 5/9/2026 at 2359Take 1 tablet (40 mg total) by mouth 2 (two) times daily for 60 days, THEN 1 tablet (40 mg total) once daily., Normal           CONTINUE these medications which have NOT CHANGED    Details   acetaminophen (TYLENOL) 500 MG tablet Take 2 tablets (1,000 mg total) by mouth every 6 (six) hours as needed for Pain., Starting Tue 2/25/2025, Normal      aspirin 81 MG Chew Chew and swallow 1 tablet (81 mg total) by mouth once daily., Starting Wed  2/26/2025, Until Thu 2/26/2026, Normal      atorvastatin (LIPITOR) 40 MG tablet Take 1 tablet (40 mg total) by mouth once daily., Starting Wed 2/26/2025, Until Thu 2/26/2026, Normal      clopidogreL (PLAVIX) 75 mg tablet Take 1 tablet (75 mg total) by mouth once daily., Starting Wed 2/26/2025, Until Thu 2/26/2026, Normal      lamotrigine (LAMICTAL) 200 MG tablet Take 1 tablet by mouth  daily, Normal      metoprolol tartrate (LOPRESSOR) 25 MG tablet Take 1 tablet (25 mg total) by mouth 2 (two) times daily., Starting Tue 2/25/2025, Until Wed 2/25/2026, Normal      cyanocobalamin (VITAMIN B-12) 100 MCG tablet Take 100 mcg by mouth once daily., Historical Med      melatonin (MELATIN) 3 mg tablet Take 2 tablets (6 mg total) by mouth nightly as needed for Insomnia., Starting Tue 2/25/2025, Normal      tamsulosin (FLOMAX) 0.4 mg Cap Take 1 capsule (0.4 mg total) by mouth once daily., Starting Wed 2/26/2025, Until Thu 2/26/2026, Normal           STOP taking these medications       ascorbic acid (VITAMIN C) 1000 MG tablet Comments:   Reason for Stopping:         famotidine (PEPCID) 20 MG tablet Comments:   Reason for Stopping:         pantoprazole (PROTONIX) 40 mg injection Comments:   Reason for Stopping:               Discharge Diet:regular diet     Activity: activity as tolerated    Discharge Condition: Good    Disposition: Home or Self Care    Tests pending at the time of discharge: none      Time spent  on the discharge of the patient including review of hospital course with the patient. reviewing discharge medications and arranging follow-up care 35 min    Discharge examination Patient was seen and examined on the date of discharge and determined to be suitable for discharge.    Discharge plan     Future Appointments   Date Time Provider Department Center   3/24/2025 11:15 AM [facility] [id number] 485 LB LIMIT [facility] XRAY OP [facility]   3/24/2025 11:30 AM EKG, APPT [facility] EKG [facility]   3/24/2025 12:00 PM Flavio Leal MD [facility]  MCKENZIE Lozada   4/3/2025  9:00 AM Ari Latham III, MD [facility] CARDMEY Loz[place]   4/28/2025  4:00 PM Joshua Fong MD [facility] CARDIO Scottie manjeet Thorpe MD

## 2025-03-23 NOTE — PROGRESS NOTES
Patient seen and examined. Patient is progressively increasing activity. No significant complaints.     Sternum: stable, incision CDI  Chest xray: Acceptable post op chest  EKG: Sinus tachycardia      Assessment: 2/21/2025  1.  Coronary artery bypass grafting x 2 (LIMA-LAD and SVG- distal RCA).  2.  Takedown of the left internal mammary artery, skeletonized technique.  3. Endoscopic vein harvest from Left Lower Extremity     Plan:  Can begin driving   Can begin cardiac rehab 6 weeks after operation   We will refer to cardiology to assume care   DC lasix, potassium  Not taking metoprolol as he feels it is affecting his blood pressure  Will send to EP   Continue Plavix  ASA previously stopped for GI bleeding        No scheduled appointment, RTC prn

## 2025-03-24 ENCOUNTER — HOSPITAL ENCOUNTER (OUTPATIENT)
Dept: RADIOLOGY | Facility: HOSPITAL | Age: 70
Discharge: HOME | End: 2025-03-24
Attending: THORACIC SURGERY (CARDIOTHORACIC VASCULAR SURGERY)
Payer: MEDICARE

## 2025-03-24 ENCOUNTER — OFFICE VISIT (OUTPATIENT)
Dept: CARDIOTHORACIC SURGERY | Facility: CLINIC | Age: 70
End: 2025-03-24
Attending: THORACIC SURGERY (CARDIOTHORACIC VASCULAR SURGERY)
Payer: MEDICARE

## 2025-03-24 ENCOUNTER — DOCUMENTATION ONLY (OUTPATIENT)
Dept: CARDIOTHORACIC SURGERY | Facility: CLINIC | Age: 70
End: 2025-03-24
Payer: MEDICARE

## 2025-03-24 ENCOUNTER — HOSPITAL ENCOUNTER (OUTPATIENT)
Dept: CARDIOLOGY | Facility: CLINIC | Age: 70
Discharge: HOME | End: 2025-03-24
Attending: THORACIC SURGERY (CARDIOTHORACIC VASCULAR SURGERY)
Payer: MEDICARE

## 2025-03-24 VITALS
DIASTOLIC BLOOD PRESSURE: 75 MMHG | HEART RATE: 100 BPM | BODY MASS INDEX: 19.69 KG/M2 | HEIGHT: 68 IN | TEMPERATURE: 98 F | SYSTOLIC BLOOD PRESSURE: 127 MMHG | WEIGHT: 129.94 LBS | OXYGEN SATURATION: 100 %

## 2025-03-24 DIAGNOSIS — Z95.1 S/P CABG (CORONARY ARTERY BYPASS GRAFT): ICD-10-CM

## 2025-03-24 DIAGNOSIS — Z95.1 S/P CABG (CORONARY ARTERY BYPASS GRAFT): Primary | ICD-10-CM

## 2025-03-24 DIAGNOSIS — R55 SYNCOPE, UNSPECIFIED SYNCOPE TYPE: ICD-10-CM

## 2025-03-24 LAB
OHS QRS DURATION: 86 MS
OHS QTC CALCULATION: 433 MS

## 2025-03-24 PROCEDURE — 3061F NEG MICROALBUMINURIA REV: CPT | Mod: CPTII,S$GLB,, | Performed by: THORACIC SURGERY (CARDIOTHORACIC VASCULAR SURGERY)

## 2025-03-24 PROCEDURE — 3066F NEPHROPATHY DOC TX: CPT | Mod: CPTII,S$GLB,, | Performed by: THORACIC SURGERY (CARDIOTHORACIC VASCULAR SURGERY)

## 2025-03-24 PROCEDURE — 1159F MED LIST DOCD IN RCRD: CPT | Mod: CPTII,S$GLB,, | Performed by: THORACIC SURGERY (CARDIOTHORACIC VASCULAR SURGERY)

## 2025-03-24 PROCEDURE — 1160F RVW MEDS BY RX/DR IN RCRD: CPT | Mod: CPTII,S$GLB,, | Performed by: THORACIC SURGERY (CARDIOTHORACIC VASCULAR SURGERY)

## 2025-03-24 PROCEDURE — 99024 POSTOP FOLLOW-UP VISIT: CPT | Mod: S$GLB,,, | Performed by: THORACIC SURGERY (CARDIOTHORACIC VASCULAR SURGERY)

## 2025-03-24 PROCEDURE — 71046 X-RAY EXAM CHEST 2 VIEWS: CPT | Mod: 26,,, | Performed by: RADIOLOGY

## 2025-03-24 PROCEDURE — 99999 PR PBB SHADOW E&M-EST. PATIENT-LVL IV: CPT | Mod: PBBFAC,,, | Performed by: THORACIC SURGERY (CARDIOTHORACIC VASCULAR SURGERY)

## 2025-03-24 PROCEDURE — 1126F AMNT PAIN NOTED NONE PRSNT: CPT | Mod: CPTII,S$GLB,, | Performed by: THORACIC SURGERY (CARDIOTHORACIC VASCULAR SURGERY)

## 2025-03-24 PROCEDURE — 71046 X-RAY EXAM CHEST 2 VIEWS: CPT | Mod: TC,FY

## 2025-03-24 PROCEDURE — 3078F DIAST BP <80 MM HG: CPT | Mod: CPTII,S$GLB,, | Performed by: THORACIC SURGERY (CARDIOTHORACIC VASCULAR SURGERY)

## 2025-03-24 PROCEDURE — 3044F HG A1C LEVEL LT 7.0%: CPT | Mod: CPTII,S$GLB,, | Performed by: THORACIC SURGERY (CARDIOTHORACIC VASCULAR SURGERY)

## 2025-03-24 PROCEDURE — 3074F SYST BP LT 130 MM HG: CPT | Mod: CPTII,S$GLB,, | Performed by: THORACIC SURGERY (CARDIOTHORACIC VASCULAR SURGERY)

## 2025-03-24 PROCEDURE — 93010 ELECTROCARDIOGRAM REPORT: CPT | Mod: S$GLB,,, | Performed by: STUDENT IN AN ORGANIZED HEALTH CARE EDUCATION/TRAINING PROGRAM

## 2025-03-24 PROCEDURE — 93005 ELECTROCARDIOGRAM TRACING: CPT | Mod: S$GLB,,, | Performed by: THORACIC SURGERY (CARDIOTHORACIC VASCULAR SURGERY)

## 2025-03-24 NOTE — PATIENT INSTRUCTIONS
From today through April 4, please do not lift, push, or pull anything greater than 5 pounds.  From April 5 until May 16, you can lift, push, and pull up to 20 pounds.  Beginning on May 17, you will no longer have any lifting, pushing, or pulling restrictions.    Please follow up with your Cardiologist and PCP within the next 4 weeks.    Please continue to wash your surgical incisions everyday with soap and water and pat them dry with a clean towel.  Please do not apply anything to your incisions other than soap and water, or submerge in a body of water until you are at least 3 months out from your surgery.

## 2025-03-24 NOTE — PROGRESS NOTES
Phase II cardiac rehab orders submitted to [facility].  Pt is scheduled to follow-up with Dr. Latham in Interventional Cardiology on 4/3, and his cardiologist, Dr. Fong on 4/28.  Referral sent to EP, per Adamaris Arnold NP. Pt reminded to continue with his 5 pound lifting, pushing, and pulling restrictions for two more weeks, and that he can then advance to lifting, pushing, and pulling up to 20 pounds for 6 weeks, for a total of 12 weeks of restrictions.  Pt reminded to continue washing his incisions everyday with soap and water.  Pt was provided with a copy of his AVS, and instructed on medication changes.  Pt verbalized understanding to all instructions.

## 2025-03-25 DIAGNOSIS — I50.20 HFREF (HEART FAILURE WITH REDUCED EJECTION FRACTION): Primary | ICD-10-CM

## 2025-03-25 NOTE — DISCHARGE SUMMARY
[facility] - Observation [address] [place] [address] Medicine  Discharge Summary      Patient Name: Ye Partida  MRN: [id number]  KYARA: [id number]  Patient Class: OP- Observation  Admission Date: 3/20/2025  Hospital Length of Stay: 0 days  Discharge Date and Time: 3/21/2025 12:01 PM  Attending Physician: No att. providers found   Discharging Provider: Braxton Correa DO  Primary Care Provider: Cameron Tran MD  [facility] Medicine Team: [facility] HOSP MED O Braxton Correa DO  Primary Care Team: [facility] HOSP MED O    HPI:   Ye Partida is a 69 year old male whose PMH includes CAD s/p CABG on 2.21.25 on plavix, HFrEF (40-50% 02/2025), HTN, HLD, and recent GI bleed secondary to marginal ulcer with bleeding visible vessel identified s/p clipping.  He received 2U PRBCs during that admission.  He was discharged 3.11.25 and resumed plavix, but not ASA per his CTS recommendations.  He began taking Fe supplements 3.18.25 and the following day had a dark stool and this morning had another stool that was solid but close to black which prompted return to the ED.  On arrival vitals were stable.  He denies any cp, shob, fever, cough, chills, abd pain, bright red blood per rectum, tarry stools, or hematemesis.  Hgb 11.7 on arrival today which is much improved from Hgb on discharge which was 8.9.  He was given IV protonix and admitted to medicine for further management.    * No surgery found *      Hospital Course:   Admitted for dark stools and GIB rule out.  Hgb remained stable during admission.  No repeat dark stools during admission.  Highly likely that his dark stool was more so related to supplemental Fe use rather than repeat GIB.  He is to follow up with GI and his PCP.  Ok to resume plavix.  Pt to cont PPI as before.  He was orthostatic on admission and received IVF.  He reportedly feels much better after fluid administration and is ready to go home.  Pt deemed appropriate for discharge; seen and examined prior to departure.  Plan  discussed with pt, who was agreeable and amenable; medications were discussed and reviewed, outpatient follow-up scheduled, ER precautions were given, all questions were answered to the pt's satisfaction, and was subsequently discharged.     Goals of Care Treatment Preferences:  Code Status: Full Code         Consults:     Assessment & Plan  Black stool  Black stools suspect secondary Fe supplementation especially given improved Hgb and hemodynamic stability  However given recent hx of GI bleed will closely monitor Hgb and hemodynamics  Ok to hold plavix  Ok for CLD; NPO at [place]  If he has drop in hemoglobin, change in hemodynamics, or melenic stools will consult GI for possible repeat endoscopy  IV protonix BID  Goal directed resuscitation, transfuse for Hgb<8 given CAD hx  HLD (hyperlipidemia)  Cont statin    HTN (hypertension)  Resume home regimen as hemodynamics tolerate  CAD (coronary artery disease)  Hold plavix  Needs outpatient cardiolgy follow up  S/P CABG (coronary artery bypass graft)  Hold plavix for now  Cont statin    Seizure disorder  Resume home regimen    HFrEF (heart failure with reduced ejection fraction)  Resume GDMT as hemodynamics tolerate    Orthostasis  Present on admission  Gentle fluids given HFrEF and recent CABG  Repeat orthostatics prior to discharge  Final Active Diagnoses:    Diagnosis Date Noted POA    PRINCIPAL PROBLEM:  Black stool [K92.1] 03/20/2025 Yes    Orthostasis [I95.1] 03/20/2025 Yes    Seizure disorder [G40.909] 03/09/2025 Yes    HFrEF (heart failure with reduced ejection fraction) [I50.20] 03/09/2025 Yes    S/P CABG (coronary artery bypass graft) [Z95.1] 02/21/2025 Not Applicable    CAD (coronary artery disease) [I25.10] 02/20/2025 Yes    HTN (hypertension) [I10] 02/19/2025 Yes    HLD (hyperlipidemia) [E78.5] 08/28/2013 Yes      Problems Resolved During this Admission:       Discharged Condition: good    Disposition: Home or Self Care    Follow Up:    Patient Instructions:       Notify your health care provider if you experience any of the following:  persistent dizziness, light-headedness, or visual disturbances     Notify your health care provider if you experience any of the following:  increased confusion or weakness     Activity as tolerated       Significant Diagnostic Studies: N/A    Pending Diagnostic Studies:       None           Medications:  Reconciled Home Medications:      Medication List        CONTINUE taking these medications      atorvastatin 40 MG tablet  Commonly known as: LIPITOR  Take 1 tablet (40 mg total) by mouth once daily.     clopidogreL 75 mg tablet  Commonly known as: PLAVIX  Take 1 tablet (75 mg total) by mouth once daily.     DULCOLAX (BISACODYL) ORAL  Take 1 tablet by mouth once daily.     folic acid-vit B6-vit B12 2.5-25-2 mg 2.5-25-2 mg Tab  Commonly known as: FOLBIC or Equiv  Take 1 tablet by mouth once daily.     lamoTRIgine 200 MG tablet  Commonly known as: LAMICTAL  Take 1 tablet by mouth  daily     LORazepam 0.5 MG tablet  Commonly known as: ATIVAN  Take 1 tablet (0.5 mg total) by mouth 2 (two) times daily.     metoprolol tartrate 25 MG tablet  Commonly known as: LOPRESSOR  Take 1 tablet (25 mg total) by mouth 2 (two) times daily.     pantoprazole 40 MG tablet  Commonly known as: PROTONIX  Take 1 tablet (40 mg total) by mouth 2 (two) times daily for 60 days, THEN 1 tablet (40 mg total) once daily.  Start taking on: March 11, 2025            STOP taking these medications      tamsulosin 0.4 mg Cap  Commonly known as: FLOMAX              Indwelling Lines/Drains at time of discharge:   Lines/Drains/Airways       None                   Time spent on the discharge of patient: >35 minutes         Braxton Correa DO  Department of Hospital Medicine  [facility] - [facility]ation 11H

## 2025-03-25 NOTE — HOSPITAL COURSE
Admitted for dark stools and GIB rule out.  Hgb remained stable during admission.  No repeat dark stools during admission.  Highly likely that his dark stool was more so related to supplemental Fe use rather than repeat GIB.  He is to follow up with GI and his PCP.  Ok to resume plavix.  Pt to cont PPI as before.  He was orthostatic on admission and received IVF.  He reportedly feels much better after fluid administration and is ready to go home.  Pt deemed appropriate for discharge; seen and examined prior to departure.  Plan discussed with pt, who was agreeable and amenable; medications were discussed and reviewed, outpatient follow-up scheduled, ER precautions were given, all questions were answered to the pt's satisfaction, and was subsequently discharged.

## 2025-03-27 RX ORDER — PANTOPRAZOLE SODIUM 40 MG/1
TABLET, DELAYED RELEASE ORAL
Qty: 90 TABLET | Refills: 3 | Status: CANCELLED | OUTPATIENT
Start: 2025-03-27 | End: 2026-05-26

## 2025-03-27 RX ORDER — FOLIC ACID-PYRIDOXINE-CYANOCOBALAMIN TAB 2.5-25-2 MG 2.5-25-2 MG
1 TAB ORAL DAILY
Qty: 30 TABLET | Refills: 0 | Status: CANCELLED | OUTPATIENT
Start: 2025-03-27

## 2025-04-03 ENCOUNTER — OFFICE VISIT (OUTPATIENT)
Dept: CARDIOLOGY | Facility: CLINIC | Age: 70
End: 2025-04-03
Payer: MEDICARE

## 2025-04-03 VITALS
SYSTOLIC BLOOD PRESSURE: 118 MMHG | HEART RATE: 96 BPM | WEIGHT: 129.63 LBS | BODY MASS INDEX: 20.83 KG/M2 | HEIGHT: 66 IN | OXYGEN SATURATION: 98 % | DIASTOLIC BLOOD PRESSURE: 76 MMHG

## 2025-04-03 DIAGNOSIS — E78.5 HYPERLIPIDEMIA, UNSPECIFIED HYPERLIPIDEMIA TYPE: ICD-10-CM

## 2025-04-03 DIAGNOSIS — Z95.1 S/P CABG (CORONARY ARTERY BYPASS GRAFT): Primary | ICD-10-CM

## 2025-04-03 DIAGNOSIS — R06.02 SHORTNESS OF BREATH: ICD-10-CM

## 2025-04-03 DIAGNOSIS — I25.10 CORONARY ARTERY DISEASE INVOLVING NATIVE HEART WITHOUT ANGINA PECTORIS, UNSPECIFIED VESSEL OR LESION TYPE: Primary | ICD-10-CM

## 2025-04-03 DIAGNOSIS — I25.10 CORONARY ARTERY DISEASE, UNSPECIFIED VESSEL OR LESION TYPE, UNSPECIFIED WHETHER ANGINA PRESENT, UNSPECIFIED WHETHER NATIVE OR TRANSPLANTED HEART: ICD-10-CM

## 2025-04-03 DIAGNOSIS — I50.20 HFREF (HEART FAILURE WITH REDUCED EJECTION FRACTION): ICD-10-CM

## 2025-04-03 DIAGNOSIS — Z95.1 S/P CABG (CORONARY ARTERY BYPASS GRAFT): ICD-10-CM

## 2025-04-03 PROCEDURE — 3008F BODY MASS INDEX DOCD: CPT | Mod: CPTII,S$GLB,, | Performed by: INTERNAL MEDICINE

## 2025-04-03 PROCEDURE — 1101F PT FALLS ASSESS-DOCD LE1/YR: CPT | Mod: CPTII,S$GLB,, | Performed by: INTERNAL MEDICINE

## 2025-04-03 PROCEDURE — 3044F HG A1C LEVEL LT 7.0%: CPT | Mod: CPTII,S$GLB,, | Performed by: INTERNAL MEDICINE

## 2025-04-03 PROCEDURE — 3288F FALL RISK ASSESSMENT DOCD: CPT | Mod: CPTII,S$GLB,, | Performed by: INTERNAL MEDICINE

## 2025-04-03 PROCEDURE — 3074F SYST BP LT 130 MM HG: CPT | Mod: CPTII,S$GLB,, | Performed by: INTERNAL MEDICINE

## 2025-04-03 PROCEDURE — 1159F MED LIST DOCD IN RCRD: CPT | Mod: CPTII,S$GLB,, | Performed by: INTERNAL MEDICINE

## 2025-04-03 PROCEDURE — 3078F DIAST BP <80 MM HG: CPT | Mod: CPTII,S$GLB,, | Performed by: INTERNAL MEDICINE

## 2025-04-03 PROCEDURE — 1126F AMNT PAIN NOTED NONE PRSNT: CPT | Mod: CPTII,S$GLB,, | Performed by: INTERNAL MEDICINE

## 2025-04-03 PROCEDURE — 99999 PR PBB SHADOW E&M-EST. PATIENT-LVL III: CPT | Mod: PBBFAC,,, | Performed by: INTERNAL MEDICINE

## 2025-04-03 PROCEDURE — 3061F NEG MICROALBUMINURIA REV: CPT | Mod: CPTII,S$GLB,, | Performed by: INTERNAL MEDICINE

## 2025-04-03 PROCEDURE — 3066F NEPHROPATHY DOC TX: CPT | Mod: CPTII,S$GLB,, | Performed by: INTERNAL MEDICINE

## 2025-04-03 PROCEDURE — 99214 OFFICE O/P EST MOD 30 MIN: CPT | Mod: S$GLB,,, | Performed by: INTERNAL MEDICINE

## 2025-04-03 RX ORDER — ATORVASTATIN CALCIUM 80 MG/1
80 TABLET, FILM COATED ORAL DAILY
Qty: 30 TABLET | Refills: 11 | Status: SHIPPED | OUTPATIENT
Start: 2025-04-03 | End: 2026-04-03

## 2025-04-03 RX ORDER — METOPROLOL SUCCINATE 25 MG/1
25 TABLET, EXTENDED RELEASE ORAL DAILY
Qty: 90 TABLET | Refills: 3 | Status: SHIPPED | OUTPATIENT
Start: 2025-04-03 | End: 2026-04-03

## 2025-04-03 NOTE — ASSESSMENT & PLAN NOTE
LIMA to LAD, SVG to RCA, Cx not revascularized   No angina but does have VARNER.     He is having VARNER which could be anginal equivalent; however, he has not yet started cardiac rehab and is not on any GDMT for his HFrEF  Do not feel revascularization of Cx is warranted yet at this time, especially with recent GI bleed and now only on plavix  Will attempt low dose GDMT (start toprol 25 mg qd) and have him start cardiac rehab. Follow up in 6-8 weeks to reassess symptoms. If continues to have VARNER will reconsider revascularization but suspect symptoms may be due to deconditioning and suboptimal GDMT  Will repeat TTE now  Also increase atorvastatin to 80 mg qd

## 2025-04-03 NOTE — PROGRESS NOTES
Subjective     Chief Complaint:    History of Present Illness:  Mr. Ye Partida is a 69 y.o. male with PMH of CAD sp CABG 2/21/25 with Dr. Leal (NINO-LAD and SVG- distal RCA), epilepsy, HTN, HLD, gastric bypass with recent GI bleed from gastrojejunostomy anastomosis s/p clipping 3/10/25 here for follow up. Patient initially was undergoing evaluation to be a kidney donor. Found to have MVD on stress testing. He then presented to ED in February of 2025 due to anxiety about his stress findings, he was asymptomatic with no chest pain or VARNER at that time. Underwent LHC which found prox LAD, prox RCA, and Cx disease. He then underwent bypass with Dr. Leal. Cx was not bypassed. He was later admitted for GI bleed (see above) while on DAPT. S/p clip, now only on plavix (no asa). Since surgery he has had VARNER. He can only walk a few feet prior to developing symptoms and having to stop. Previous to this he was working out weekly and could walk indefinitely on flat ground. No heart failure symptoms although EF during index admission was 40-45. Not currently on any GDMT. He denies having any chest pain. Denies orthopnea, LE edema, chest discomfort, palpitations, lightheadedness, and syncope.       PAST HISTORY:     Past Medical History:   Diagnosis Date    Hyperlipidemia     Pyloric stenosis, congenital     s/p repair    Seizures        Cardiac History   Results for orders placed during the hospital encounter of 02/19/25    Echo    Interpretation Summary    Left Ventricle: The left ventricle is normal in size. Ventricular mass is normal. Normal wall thickness. Regional wall motion abnormalities present in RCA and LCX distribution. See diagram for wall motion findings. There is mildly reduced systolic function with a visually estimated ejection fraction of 40 - 50%. Quantitated ejection fraction is 47%. Global longitudinal strain is -14.0%. There is normal diastolic function.    Right Ventricle: Normal right  "ventricular cavity size. Wall thickness is normal. Systolic function is normal.    Left Atrium: Left atrium is mildly dilated. The left atrium volume index is 36 mL/m2.    Mitral Valve: There is mild regurgitation with a centrally directed jet.    Aorta: Aortic root is normal in size measuring 3.68 cm. Ascending aorta is mildly dilated measuring 3.77 cm.    IVC/SVC: Normal venous pressure at 3 mmHg.    EF   Date Value Ref Range Status   01/31/2025 50 % Final     No results found for this or any previous visit.      MEDICATIONS:     Current Outpatient Medications on File Prior to Visit   Medication Sig    clopidogreL (PLAVIX) 75 mg tablet Take 1 tablet (75 mg total) by mouth once daily.    DULCOLAX, BISACODYL, ORAL Take 1 tablet by mouth once daily.    folic acid-vit B6-vit B12 2.5-25-2 mg (FOLBIC OR EQUIV) 2.5-25-2 mg Tab Take 1 tablet by mouth once daily.    lamotrigine (LAMICTAL) 200 MG tablet Take 1 tablet by mouth  daily    LORazepam (ATIVAN) 0.5 MG tablet Take 1 tablet (0.5 mg total) by mouth 2 (two) times daily.    pantoprazole (PROTONIX) 40 MG tablet Take 1 tablet (40 mg total) by mouth 2 (two) times daily for 60 days, THEN 1 tablet (40 mg total) once daily.    [DISCONTINUED] atorvastatin (LIPITOR) 40 MG tablet Take 1 tablet (40 mg total) by mouth once daily.    [DISCONTINUED] metoprolol tartrate (LOPRESSOR) 25 MG tablet Take 1 tablet (25 mg total) by mouth 2 (two) times daily.     No current facility-administered medications on file prior to visit.       SUBJECTIVE:     Review of Systems   Respiratory:  Positive for shortness of breath.    Cardiovascular:  Negative for chest pain, palpitations, orthopnea, claudication, leg swelling and PND.        OBJECTIVE:     Vital Signs:  Vitals:    04/03/25 0831 04/03/25 0832   BP: 117/70 118/76   BP Location: Right arm Right arm   Patient Position: Sitting Sitting   Pulse: 96    SpO2: 98% 98%   Weight: 58.8 kg (129 lb 10.1 oz) 58.8 kg (129 lb 10.1 oz)   Height: 5' 6" " "(1.676 m) 5' 6" (1.676 m)     Body mass index is 20.92 kg/m².     Physical Exam  HENT:      Head: Normocephalic and atraumatic.   Eyes:      Conjunctiva/sclera: Conjunctivae normal.   Neck:      Comments: No JVD at 30 degrees  Cardiovascular:      Rate and Rhythm: Normal rate and regular rhythm.      Heart sounds: Normal heart sounds.   Pulmonary:      Effort: Pulmonary effort is normal. No respiratory distress.      Breath sounds: Normal breath sounds. No wheezing.   Abdominal:      General: There is no distension.      Palpations: Abdomen is soft.      Tenderness: There is no abdominal tenderness.   Musculoskeletal:      Cervical back: Normal range of motion.      Right lower leg: No edema.      Left lower leg: No edema.   Neurological:      Mental Status: He is alert and oriented to person, place, and time.   Psychiatric:         Mood and Affect: Affect normal.         Laboratory  Lab Results   Component Value Date    WBC 7.41 03/21/2025    HGB 10.2 (L) 03/21/2025    HCT 33.0 (L) 03/21/2025    MCV 92 03/21/2025     03/21/2025     BMP  Lab Results   Component Value Date     03/21/2025    K 4.6 03/21/2025     03/21/2025    CO2 24 03/21/2025    BUN 12 03/21/2025    CREATININE 1.0 03/21/2025    CALCIUM 8.7 03/21/2025    ANIONGAP 10 03/21/2025    EGFRNORACEVR >60.0 03/21/2025     Lab Results   Component Value Date    INR 1.1 03/09/2025    INR 1.1 02/23/2025    INR 1.1 02/22/2025     Lab Results   Component Value Date    HGBA1C 5.2 01/29/2025     No results for input(s): "POCTGLUCOSE" in the last 72 hours.    Diagnostic Results:  Labs: Reviewed  Echo: Reviewed    ASSESSMENT & PLAN:     1. Coronary artery disease involving native heart without angina pectoris, unspecified vessel or lesion type  Assessment & Plan:  LIMA to LAD, SVG to RCA, Cx not revascularized   No angina but does have VARNER.     He is having VARNER which could be anginal equivalent; however, he has not yet started cardiac rehab and is not " on any GDMT for his HFrEF  Do not feel revascularization of Cx is warranted yet at this time, especially with recent GI bleed and now only on plavix  Will attempt low dose GDMT (start toprol 25 mg qd) and have him start cardiac rehab. Follow up in 6-8 weeks to reassess symptoms. If continues to have VARNER will reconsider revascularization but suspect symptoms may be due to deconditioning and suboptimal GDMT  Will repeat TTE now  Also increase atorvastatin to 80 mg qd      2. HFrEF (heart failure with reduced ejection fraction)  Assessment & Plan:  Start toprol 25mg qd  Hold off on ACE/ARB for now given soft Bps. Will address at next visit       3. S/P CABG (coronary artery bypass graft)    4. Hyperlipidemia, unspecified hyperlipidemia type  Assessment & Plan:  Lab Results   Component Value Date    LDLCALC 79.0 01/29/2025     Increase atorvastatin to 80mg      Other orders  -     metoprolol succinate (TOPROL-XL) 25 MG 24 hr tablet; Take 1 tablet (25 mg total) by mouth once daily.  Dispense: 90 tablet; Refill: 3  -     atorvastatin (LIPITOR) 80 MG tablet; Take 1 tablet (80 mg total) by mouth once daily.  Dispense: 30 tablet; Refill: 11           Discussed with Dr. Latham  - staff attestation to follow      Chuckie Ward MD  Cardiovascular Disease Fellow PGY-6

## 2025-04-07 RX ORDER — PANTOPRAZOLE SODIUM 40 MG/1
TABLET, DELAYED RELEASE ORAL
Qty: 90 TABLET | Refills: 3 | OUTPATIENT
Start: 2025-04-07 | End: 2026-06-06

## 2025-04-07 RX ORDER — FOLIC ACID-PYRIDOXINE-CYANOCOBALAMIN TAB 2.5-25-2 MG 2.5-25-2 MG
1 TAB ORAL DAILY
Qty: 30 TABLET | Refills: 0 | OUTPATIENT
Start: 2025-04-07

## 2025-04-11 ENCOUNTER — TELEPHONE (OUTPATIENT)
Dept: CARDIOLOGY | Facility: CLINIC | Age: 70
End: 2025-04-11
Payer: MEDICARE

## 2025-04-11 ENCOUNTER — HOSPITAL ENCOUNTER (OUTPATIENT)
Dept: CARDIOLOGY | Facility: HOSPITAL | Age: 70
Discharge: HOME | End: 2025-04-11
Attending: INTERNAL MEDICINE
Payer: MEDICARE

## 2025-04-11 VITALS
BODY MASS INDEX: 20.83 KG/M2 | SYSTOLIC BLOOD PRESSURE: 118 MMHG | WEIGHT: 129.63 LBS | DIASTOLIC BLOOD PRESSURE: 78 MMHG | HEIGHT: 66 IN | HEART RATE: 80 BPM

## 2025-04-11 DIAGNOSIS — Z95.1 S/P CABG (CORONARY ARTERY BYPASS GRAFT): ICD-10-CM

## 2025-04-11 DIAGNOSIS — I25.10 CORONARY ARTERY DISEASE, UNSPECIFIED VESSEL OR LESION TYPE, UNSPECIFIED WHETHER ANGINA PRESENT, UNSPECIFIED WHETHER NATIVE OR TRANSPLANTED HEART: ICD-10-CM

## 2025-04-11 LAB
ASCENDING AORTA: 3.87 CM
AV AREA BY CONTINUOUS VTI: 2.6 CM2
AV INDEX (PROSTH): 0.82
AV LVOT MEAN GRADIENT: 2 MMHG
AV LVOT PEAK GRADIENT: 4 MMHG
AV MEAN GRADIENT: 4 MMHG
AV PEAK GRADIENT: 7 MMHG
AV VALVE AREA BY VELOCITY RATIO: 2.4 CM²
AV VALVE AREA: 2.6 CM2
AV VELOCITY RATIO: 0.77
BSA FOR ECHO PROCEDURE: 1.65 M2
CV ECHO LV RWT: 0.08 CM
DOP CALC AO PEAK VEL: 1.3 M/S
DOP CALC AO VTI: 27.1 CM
DOP CALC LVOT AREA: 3.1 CM2
DOP CALC LVOT DIAMETER: 2 CM
DOP CALC LVOT PEAK VEL: 1 M/S
DOP CALC LVOT STROKE VOLUME: 69.7 CM3
DOP CALCLVOT PEAK VEL VTI: 22.2 CM
E WAVE DECELERATION TIME: 321 MS
E/A RATIO: 1.04
E/E' RATIO: 10 M/S
ECHO EF ESTIMATED: 46 %
ECHO LV POSTERIOR WALL: 0.2 CM (ref 0.6–1.1)
FRACTIONAL SHORTENING: 24.5 % (ref 28–44)
INTERVENTRICULAR SEPTUM: 0.9 CM (ref 0.6–1.1)
IVC DIAMETER: 1.7 CM
LA MAJOR: 5.8 CM
LA MINOR: 5.5 CM
LA WIDTH: 3.5 CM
LEFT ATRIUM SIZE: 2.7 CM
LEFT ATRIUM VOLUME INDEX MOD: 34 ML/M2
LEFT ATRIUM VOLUME INDEX: 27 ML/M2
LEFT ATRIUM VOLUME MOD: 57 ML
LEFT ATRIUM VOLUME: 45 CM3
LEFT INTERNAL DIMENSION IN SYSTOLE: 3.7 CM (ref 2.1–4)
LEFT VENTRICLE DIASTOLIC VOLUME INDEX: 66.87 ML/M2
LEFT VENTRICLE DIASTOLIC VOLUME: 111 ML
LEFT VENTRICLE MASS INDEX: 49.7 G/M2
LEFT VENTRICLE SYSTOLIC VOLUME INDEX: 36.1 ML/M2
LEFT VENTRICLE SYSTOLIC VOLUME: 60 ML
LEFT VENTRICULAR INTERNAL DIMENSION IN DIASTOLE: 4.9 CM (ref 3.5–6)
LEFT VENTRICULAR MASS: 82.4 G
LV LATERAL E/E' RATIO: 7.3
LV SEPTAL E/E' RATIO: 14.5
MV A" WAVE DURATION": 114.18 MS
MV PEAK A VEL: 0.56 M/S
MV PEAK E VEL: 0.58 M/S
OHS CV RV/LV RATIO: 0.76 CM
PISA TR MAX VEL: 1.8 M/S
PULM VEIN A" WAVE DURATION": 114.18 MS
PULM VEIN S/D RATIO: 1.43
PULMONIC VEIN PEAK A VELOCITY: 0.4 M/S
PV PEAK D VEL: 0.51 M/S
PV PEAK S VEL: 0.73 M/S
RA MAJOR: 5.24 CM
RA PRESSURE ESTIMATED: 3 MMHG
RA WIDTH: 3.9 CM
RIGHT ATRIAL AREA: 16.7 CM2
RIGHT VENTRICLE DIASTOLIC BASEL DIMENSION: 3.7 CM
RV TB RVSP: 5 MMHG
RV TISSUE DOPPLER FREE WALL SYSTOLIC VELOCITY 1 (APICAL 4 CHAMBER VIEW): 8.2 CM/S
SINUS: 3.88 CM
STJ: 3.48 CM
TDI LATERAL: 0.08 M/S
TDI SEPTAL: 0.04 M/S
TDI: 0.06 M/S
TRICUSPID ANNULAR PLANE SYSTOLIC EXCURSION: 0.88 CM
TV PEAK GRADIENT: 13 MMHG
TV REST PULMONARY ARTERY PRESSURE: 16 MMHG
Z-SCORE OF LEFT VENTRICULAR DIMENSION IN END DIASTOLE: 0.52
Z-SCORE OF LEFT VENTRICULAR DIMENSION IN END SYSTOLE: 1.98

## 2025-04-11 PROCEDURE — 93306 TTE W/DOPPLER COMPLETE: CPT | Mod: 26,,, | Performed by: INTERNAL MEDICINE

## 2025-04-11 PROCEDURE — 93306 TTE W/DOPPLER COMPLETE: CPT

## 2025-04-11 NOTE — TELEPHONE ENCOUNTER
Pt called back to office,  discuss echo results.  Instructed to continue with cardiac rehab and follow up with Dr Latham once cardiac rehab sessions have been completed.  Pt agrees.

## 2025-04-11 NOTE — TELEPHONE ENCOUNTER
Attempt to reach patient, no answer.  Review of ECHO by Dr Latham -  notification of EF is slightly better and return to office after cardiac rehab is completed.

## 2025-04-17 ENCOUNTER — TELEPHONE (OUTPATIENT)
Dept: CARDIOLOGY | Facility: CLINIC | Age: 70
End: 2025-04-17
Payer: MEDICARE

## 2025-04-17 NOTE — TELEPHONE ENCOUNTER
Successful contact with patient.  Follow up questions regarding cardiology appointments.  All questions answered.  Pt voiced he was told when doing a physical for possible donation for his son, his EKG was abnormal and needed a cardiology visit.    Pt voiced feeling better after starting cardiac rehab and occassionally experiences some dizziness with moving quickly.  Advised to move slower to sit up to stand and taking time before moving to avoid those changes to prevent dizziness.  Will speak with Dr Latham on EKG reported abnormal.  Noted appt with general cardiology 4/28 and appt with arrythmia specialist on 4/29.

## 2025-04-27 NOTE — PROGRESS NOTES
Subjective:   Patient ID:  Ye Partida is a 69 y.o. male who presents for follow-up of CAD    HPI: The patient is here for CAD.   The patient has no chest pain, SOB, TIA, palpitations, syncope or pre-syncope.Patient currently exercises many times per week.BPs mostly low to mid 90s to 110-115ish. In CR now        Review of Systems   Constitutional: Negative for chills, decreased appetite, diaphoresis, fever, malaise/fatigue, night sweats, weight gain and weight loss.   HENT:  Negative for congestion, hoarse voice, nosebleeds, sore throat and tinnitus.    Eyes:  Negative for blurred vision, double vision, vision loss in left eye, vision loss in right eye, visual disturbance and visual halos.   Cardiovascular:  Negative for chest pain, claudication, cyanosis, dyspnea on exertion, irregular heartbeat, leg swelling, near-syncope, orthopnea, palpitations, paroxysmal nocturnal dyspnea and syncope.   Respiratory:  Negative for cough, hemoptysis, shortness of breath, sleep disturbances due to breathing, snoring, sputum production and wheezing.    Endocrine: Negative for cold intolerance, heat intolerance, polydipsia, polyphagia and polyuria.   Hematologic/Lymphatic: Negative for adenopathy and bleeding problem. Does not bruise/bleed easily.   Skin:  Negative for color change, dry skin, flushing, itching, nail changes, poor wound healing, rash, skin cancer, suspicious lesions and unusual hair distribution.   Musculoskeletal:  Negative for arthritis, back pain, falls, gout, joint pain, joint swelling, muscle cramps, muscle weakness, myalgias and stiffness.   Gastrointestinal:  Negative for abdominal pain, anorexia, change in bowel habit, constipation, diarrhea, dysphagia, heartburn, hematemesis, hematochezia, melena and vomiting.   Genitourinary:  Negative for decreased libido, dysuria, hematuria, hesitancy and urgency.   Neurological:  Negative for excessive daytime sleepiness, dizziness, focal weakness, headaches,  "light-headedness, loss of balance, numbness, paresthesias, seizures, sensory change, tremors, vertigo and weakness.   Psychiatric/Behavioral:  Negative for altered mental status, depression, hallucinations, memory loss, substance abuse and suicidal ideas. The patient does not have insomnia and is not nervous/anxious.    Allergic/Immunologic: Negative for environmental allergies and hives.       Objective: /87 (BP Location: Left arm, Patient Position: Sitting)   Pulse 80   Ht 5' 8" (1.727 m)   Wt 59.4 kg (130 lb 15.3 oz)   BMI 19.91 kg/m²      Physical Exam  Constitutional:       General: He is not in acute distress.     Appearance: He is well-developed. He is not diaphoretic.   HENT:      Head: Normocephalic.   Eyes:      Pupils: Pupils are equal, round, and reactive to light.   Neck:      Thyroid: No thyromegaly.   Cardiovascular:      Rate and Rhythm: Normal rate and regular rhythm.      Pulses: Intact distal pulses.           Carotid pulses are 3+ on the right side and 3+ on the left side.       Radial pulses are 3+ on the right side and 3+ on the left side.        Femoral pulses are 3+ on the right side and 3+ on the left side.       Popliteal pulses are 3+ on the right side and 3+ on the left side.        Dorsalis pedis pulses are 3+ on the right side and 3+ on the left side.        Posterior tibial pulses are 3+ on the right side and 3+ on the left side.      Heart sounds: Normal heart sounds. No murmur heard.     No friction rub. No gallop.   Pulmonary:      Effort: Pulmonary effort is normal. No respiratory distress.      Breath sounds: Normal breath sounds. No wheezing or rales.   Chest:      Chest wall: No tenderness.   Abdominal:      General: There is no distension.      Palpations: Abdomen is soft. There is no mass.      Tenderness: There is no abdominal tenderness.   Musculoskeletal:         General: Normal range of motion.      Cervical back: Normal range of motion.   Lymphadenopathy:      " Cervical: No cervical adenopathy.   Skin:     General: Skin is warm.      Nails: There is no clubbing.   Neurological:      Mental Status: He is alert and oriented to person, place, and time.   Psychiatric:         Speech: Speech normal.         Behavior: Behavior normal.         Thought Content: Thought content normal.         Judgment: Judgment normal.         Assessment:     1. Coronary artery disease involving native heart without angina pectoris, unspecified vessel or lesion type    2. S/P CABG (coronary artery bypass graft)    3. Hypertension, unspecified type    4. Peripheral polyneuropathy    5. Hyperlipidemia, unspecified hyperlipidemia type    6. Ischemic cardiomyopathy    7. Anemia, unspecified type    8. Upper gastrointestinal hemorrhage        Plan:   Discussed diet , achieving and maintaining ideal body weight, and exercise.   We reviewed meds in detail.  Reassured-Discussed goals, options, plan.  Omega-3 > 800 mg/d combined EPA/DHA.  Goal BP< 130/80.  Goal LDL < 55.  NTG should be refilled every 8-9 months.  Co Q 10 200 mg/d  Could later consider SGLT2I  Can get Lpa , BNP with next lab  Divide the Metoprolol to half am and half pm-if BP gets too low later we can make just half once      Ye was seen today for shortness of breath, establish care, hypertension and hyperlipidemia.    Diagnoses and all orders for this visit:    Coronary artery disease involving native heart without angina pectoris, unspecified vessel or lesion type  -     CBC Auto Differential; Future  -     Lipoprotein A (LPA); Future  -     BNP; Future  -     nitroGLYCERIN (NITROSTAT) 0.4 MG SL tablet; Place 1 tablet (0.4 mg total) under the tongue every 5 (five) minutes as needed for Chest pain.  -     EKG 12-lead; Future  -     Echo; Future    S/P CABG (coronary artery bypass graft)  -     nitroGLYCERIN (NITROSTAT) 0.4 MG SL tablet; Place 1 tablet (0.4 mg total) under the tongue every 5 (five) minutes as needed for Chest pain.  -      EKG 12-lead; Future  -     Echo; Future    Hypertension, unspecified type  -     BNP; Future  -     nitroGLYCERIN (NITROSTAT) 0.4 MG SL tablet; Place 1 tablet (0.4 mg total) under the tongue every 5 (five) minutes as needed for Chest pain.  -     EKG 12-lead; Future  -     Echo; Future    Peripheral polyneuropathy    Hyperlipidemia, unspecified hyperlipidemia type  -     Lipoprotein A (LPA); Future    Ischemic cardiomyopathy  -     EKG 12-lead; Future  -     Echo; Future    Anemia, unspecified type    Upper gastrointestinal hemorrhage  -     CBC Auto Differential; Future            Follow up in about 6 months (around 10/28/2025) for with labs and CFD Hetal Fong to read; labs now.

## 2025-04-28 ENCOUNTER — LAB VISIT (OUTPATIENT)
Dept: LAB | Facility: HOSPITAL | Age: 70
End: 2025-04-28
Attending: INTERNAL MEDICINE
Payer: MEDICARE

## 2025-04-28 ENCOUNTER — OFFICE VISIT (OUTPATIENT)
Dept: CARDIOLOGY | Facility: CLINIC | Age: 70
End: 2025-04-28
Payer: MEDICARE

## 2025-04-28 VITALS
BODY MASS INDEX: 19.84 KG/M2 | HEIGHT: 68 IN | DIASTOLIC BLOOD PRESSURE: 87 MMHG | SYSTOLIC BLOOD PRESSURE: 133 MMHG | HEART RATE: 80 BPM | WEIGHT: 130.94 LBS

## 2025-04-28 DIAGNOSIS — I25.10 CORONARY ARTERY DISEASE INVOLVING NATIVE HEART WITHOUT ANGINA PECTORIS, UNSPECIFIED VESSEL OR LESION TYPE: Primary | ICD-10-CM

## 2025-04-28 DIAGNOSIS — E78.5 HYPERLIPIDEMIA, UNSPECIFIED HYPERLIPIDEMIA TYPE: ICD-10-CM

## 2025-04-28 DIAGNOSIS — Z95.1 S/P CABG (CORONARY ARTERY BYPASS GRAFT): ICD-10-CM

## 2025-04-28 DIAGNOSIS — K92.2 UPPER GASTROINTESTINAL HEMORRHAGE: ICD-10-CM

## 2025-04-28 DIAGNOSIS — G62.9 PERIPHERAL POLYNEUROPATHY: ICD-10-CM

## 2025-04-28 DIAGNOSIS — D64.9 ANEMIA, UNSPECIFIED TYPE: ICD-10-CM

## 2025-04-28 DIAGNOSIS — I25.5 ISCHEMIC CARDIOMYOPATHY: ICD-10-CM

## 2025-04-28 DIAGNOSIS — I10 HYPERTENSION, UNSPECIFIED TYPE: ICD-10-CM

## 2025-04-28 DIAGNOSIS — I25.10 CORONARY ARTERY DISEASE INVOLVING NATIVE HEART WITHOUT ANGINA PECTORIS, UNSPECIFIED VESSEL OR LESION TYPE: ICD-10-CM

## 2025-04-28 LAB
ABSOLUTE EOSINOPHIL (OHS): 0.2 K/UL
ABSOLUTE MONOCYTE (OHS): 0.86 K/UL (ref 0.3–1)
ABSOLUTE NEUTROPHIL COUNT (OHS): 3.96 K/UL (ref 1.8–7.7)
BASOPHILS # BLD AUTO: 0.03 K/UL
BASOPHILS NFR BLD AUTO: 0.5 %
BNP SERPL-MCNC: 95 PG/ML (ref 0–99)
ERYTHROCYTE [DISTWIDTH] IN BLOOD BY AUTOMATED COUNT: 18.8 % (ref 11.5–14.5)
HCT VFR BLD AUTO: 38.5 % (ref 40–54)
HGB BLD-MCNC: 11.4 GM/DL (ref 14–18)
IMM GRANULOCYTES # BLD AUTO: 0.02 K/UL (ref 0–0.04)
IMM GRANULOCYTES NFR BLD AUTO: 0.3 % (ref 0–0.5)
LYMPHOCYTES # BLD AUTO: 1.31 K/UL (ref 1–4.8)
MCH RBC QN AUTO: 24.8 PG (ref 27–31)
MCHC RBC AUTO-ENTMCNC: 29.6 G/DL (ref 32–36)
MCV RBC AUTO: 84 FL (ref 82–98)
NUCLEATED RBC (/100WBC) (OHS): 0 /100 WBC
PLATELET # BLD AUTO: 370 K/UL (ref 150–450)
PMV BLD AUTO: 9.9 FL (ref 9.2–12.9)
RBC # BLD AUTO: 4.6 M/UL (ref 4.6–6.2)
RELATIVE EOSINOPHIL (OHS): 3.1 %
RELATIVE LYMPHOCYTE (OHS): 20.5 % (ref 18–48)
RELATIVE MONOCYTE (OHS): 13.5 % (ref 4–15)
RELATIVE NEUTROPHIL (OHS): 62.1 % (ref 38–73)
WBC # BLD AUTO: 6.38 K/UL (ref 3.9–12.7)

## 2025-04-28 PROCEDURE — 3066F NEPHROPATHY DOC TX: CPT | Mod: CPTII,S$GLB,, | Performed by: INTERNAL MEDICINE

## 2025-04-28 PROCEDURE — 85025 COMPLETE CBC W/AUTO DIFF WBC: CPT

## 2025-04-28 PROCEDURE — 3079F DIAST BP 80-89 MM HG: CPT | Mod: CPTII,S$GLB,, | Performed by: INTERNAL MEDICINE

## 2025-04-28 PROCEDURE — 3061F NEG MICROALBUMINURIA REV: CPT | Mod: CPTII,S$GLB,, | Performed by: INTERNAL MEDICINE

## 2025-04-28 PROCEDURE — 3288F FALL RISK ASSESSMENT DOCD: CPT | Mod: CPTII,S$GLB,, | Performed by: INTERNAL MEDICINE

## 2025-04-28 PROCEDURE — 99214 OFFICE O/P EST MOD 30 MIN: CPT | Mod: S$GLB,,, | Performed by: INTERNAL MEDICINE

## 2025-04-28 PROCEDURE — 1159F MED LIST DOCD IN RCRD: CPT | Mod: CPTII,S$GLB,, | Performed by: INTERNAL MEDICINE

## 2025-04-28 PROCEDURE — 1126F AMNT PAIN NOTED NONE PRSNT: CPT | Mod: CPTII,S$GLB,, | Performed by: INTERNAL MEDICINE

## 2025-04-28 PROCEDURE — 3008F BODY MASS INDEX DOCD: CPT | Mod: CPTII,S$GLB,, | Performed by: INTERNAL MEDICINE

## 2025-04-28 PROCEDURE — 83695 ASSAY OF LIPOPROTEIN(A): CPT

## 2025-04-28 PROCEDURE — 1101F PT FALLS ASSESS-DOCD LE1/YR: CPT | Mod: CPTII,S$GLB,, | Performed by: INTERNAL MEDICINE

## 2025-04-28 PROCEDURE — 36415 COLL VENOUS BLD VENIPUNCTURE: CPT

## 2025-04-28 PROCEDURE — 3075F SYST BP GE 130 - 139MM HG: CPT | Mod: CPTII,S$GLB,, | Performed by: INTERNAL MEDICINE

## 2025-04-28 PROCEDURE — 83880 ASSAY OF NATRIURETIC PEPTIDE: CPT

## 2025-04-28 PROCEDURE — 99999 PR PBB SHADOW E&M-EST. PATIENT-LVL IV: CPT | Mod: PBBFAC,,, | Performed by: INTERNAL MEDICINE

## 2025-04-28 PROCEDURE — 3044F HG A1C LEVEL LT 7.0%: CPT | Mod: CPTII,S$GLB,, | Performed by: INTERNAL MEDICINE

## 2025-04-28 RX ORDER — NITROGLYCERIN 0.4 MG/1
0.4 TABLET SUBLINGUAL EVERY 5 MIN PRN
Qty: 25 TABLET | Refills: 3 | Status: SHIPPED | OUTPATIENT
Start: 2025-04-28 | End: 2025-05-28

## 2025-04-28 NOTE — PATIENT INSTRUCTIONS
Discussed diet , achieving and maintaining ideal body weight, and exercise.   We reviewed meds in detail.  Reassured-Discussed goals, options, plan.  Omega-3 > 800 mg/d combined EPA/DHA.  Goal BP< 130/80.  Goal LDL < 55.  NTG should be refilled every 8-9 months.  Co Q 10 200 mg/d  Could later consider SGLT2I  Can get Lpa , BNP with next lab  Divide the Metoprolol to half am and half pm-if BP gets too low later we can make just half once

## 2025-04-29 ENCOUNTER — RESULTS FOLLOW-UP (OUTPATIENT)
Dept: CARDIOLOGY | Facility: CLINIC | Age: 70
End: 2025-04-29

## 2025-05-02 LAB — W LP(A): 49 NMOL/L

## 2025-05-02 NOTE — PROGRESS NOTES
Your results look fine and do not require any change in treatment.     Please contact me if you have any additional concerns.    Sincerely,    Joshua Fong

## 2025-05-20 ENCOUNTER — TELEPHONE (OUTPATIENT)
Dept: CARDIOLOGY | Facility: CLINIC | Age: 70
End: 2025-05-20
Payer: MEDICARE

## 2025-05-20 NOTE — TELEPHONE ENCOUNTER
Clearance form was filled and signed by dr Fong. It was faxed to [facility], [facility] at [phone] . Pt  was updated per phone call.

## 2025-05-20 NOTE — TELEPHONE ENCOUNTER
----- Message from Joshua Fong MD sent at 5/20/2025  8:50 AM CDT -----  Regarding: RE: EGD Clearance  Yes,CJL  ----- Message -----  From: Peggy Biswas, RN  Sent: 5/20/2025   8:42 AM CDT  To: Joshua Fong MD; Peggy Biswas, RN  Subject: EGD Clearance                                    Pt to be scheduled for EGD w. [facility], [facility].  They are requested clearance and authorization to hold Plavix for 5 days.  Pt on Plavix only. Lv w. dr Fong on 4/28/25.Please advise if pt  can be cleared by chart and I will bring you the form.

## 2025-06-24 ENCOUNTER — TELEPHONE (OUTPATIENT)
Dept: ENDOSCOPY | Facility: HOSPITAL | Age: 70
End: 2025-06-24
Payer: MEDICARE

## 2025-06-24 NOTE — TELEPHONE ENCOUNTER
"----- Message from Juany sent at 3/11/2025  3:19 PM CDT -----  Regarding: FW: EGD (hospital followup) 8 week re-scope for healing    ----- Message -----  From: Shelby Barrera FNP-C  Sent: 3/11/2025  11:29 AM CDT  To: [facility]  Subject: EGD (hospital followup) 8 week re-scope for #    Procedure: EGD    Diagnosis: Hematemesis and Peptic ulcer disease    Procedure Timin weeks    #If within 4 weeks selected, please froilan as high priority#    #If greater than 12 weeks, please select "5-12 weeks" and delay sending until 3 months prior to requested date#     Location: Any Site    Additional Scheduling Information: Blood thinners    Prep Specifications:N/A    Is the patient taking a GLP-1 Agonist:no    Have you attached a patient to this message: yes  "

## 2025-06-30 NOTE — PROGRESS NOTES
Subjective   Patient ID:  Ye Partida is a 69 y.o. male who presents for evaluation of Loss of Consciousness      69 yoM here for evaluation of syncope. He underwent CABG by Dr Leal 2/21/25. He presented twice in March 2025 for GI bleeding. A referral for syncope was placed 3/24/25 however there was no mention of syncope on either of the DC Summaries. EF 45-50%. He reports that he passed out during his GI discomfort associated with a bleeding ulcer. After he got definitive therapy for his GI bleed he had no further syncope.     Echo 4/25:  ·  Left Ventricle: The left ventricle is normal in size. Normal wall thickness. Regional wall motion abnormalities present. There is mildly reduced systolic function with a visually estimated ejection fraction of 45 - 50%.  ·  Right Ventricle: The right ventricle is normal in size. Wall thickness is normal. Systolic function is normal.  ·  Pulmonary Artery: The estimated pulmonary artery systolic pressure is 16 mmHg.  ·  IVC/SVC: Normal venous pressure at 3 mmHg.    My interpretation of the ECG is:  SBr nl IA, QRS, QTc    Past Medical History:  No date: Hyperlipidemia  No date: Pyloric stenosis, congenital      Comment:  s/p repair  No date: Seizures    Past Surgical History:  2/19/2025: CORONARY ANGIOGRAPHY; Right      Comment:  Procedure: ANGIOGRAM, CORONARY ARTERY;  Surgeon: Ari Latham III, MD;  Location: [facility] CATH LAB;  Service:                Cardiology;  Laterality: Right;  2/21/2025: CORONARY ARTERY BYPASS GRAFT (CABG); N/A      Comment:  Procedure: CORONARY ARTERY BYPASS GRAFT (CABG);                 Surgeon: Flavoi Leal MD;  Location: [facility] OR [address] [place] [address];                Service: Cardiovascular;  Laterality: N/A;  CABG x                2LIMA TO LADSVG TO RCA  2/21/2025: ENDOSCOPIC HARVEST OF VEIN; Left      Comment:  Procedure: HARVEST-VEIN-ENDOVASCULAR;  Surgeon: Flavio Leal MD;  Location: [facility] OR [address] [place] [address];  Service:                 Cardiovascular;  Laterality: Left;  VEIN START-VEIN               OUT-1112VEIN PREP START-1113VEIN READY-1131  3/10/2025: ESOPHAGOGASTRODUODENOSCOPY; N/A      Comment:  Procedure: EGD (ESOPHAGOGASTRODUODENOSCOPY);  Surgeon:                Oneil Key MD;  Location: [address]);                 Service: Endoscopy;  Laterality: N/A;  No date: pyloric stenosis  No date: TONSILLECTOMY    Social History    Socioeconomic History      Marital status:     Occupational History        Employer: [employer]    Tobacco Use      Smoking status: Former        Packs/day: 0.00        Types: Cigarettes        Quit date:         Years since quittin.5      Smokeless tobacco: Never    Substance and Sexual Activity      Alcohol use: No      Drug use: No      Sexual activity: Yes        Partners: Female    Social Drivers of Health  Financial Resource Strain: Low Risk  (5/3/2025)      Received from [facility]      Overall Financial Resource Strain (CARDIA)          Difficulty of Paying Living Expenses: Not hard at all  Food Insecurity: No Food Insecurity (5/3/2025)      Received from [facility]      Hunger Vital Sign          Worried About Running Out of Food in the Last Year: Never true          Ran Out of Food in the Last Year: Never true  Transportation Needs: No Transportation Needs (5/3/2025)      Received from [facility]      PRAPARE - Transportation          Lack of Transportation (Medical): No          Lack of Transportation (Non-Medical): No  Physical Activity: Inactive (5/3/2025)      Received from [facility]      Exercise Vital Sign          Days of Exercise per Week: 0 days          Minutes of Exercise per Session: 0 min  Stress: No Stress Concern Present (5/3/2025)      Received from [facility]      [employer] - Occupational Stress Questionnaire          Feeling of Stress : Only a little  Housing Stability: Low Risk  (5/3/2025)       Received from [facility]      Housing Stability Vital Sign          Unable to Pay for Housing in the Last Year: No          Number of Times Moved in the Last Year: 0          Homeless in the Last Year: No    Review of patient's family history indicates:  Problem: Cancer      Relation: Mother          Name:               Age of Onset: (Not Specified)              Comment: breast ca  Problem: Arthritis      Relation: Mother          Name:               Age of Onset: (Not Specified)  Problem: Hypertension      Relation: Mother          Name:               Age of Onset: (Not Specified)  Problem: Cancer      Relation: Father          Name:               Age of Onset: (Not Specified)              Comment: prostate ca  Problem: Alcohol abuse      Relation: Sister          Name:               Age of Onset: (Not Specified)  Problem: Hypertension      Relation: Sister          Name:               Age of Onset: (Not Specified)  Problem: Obesity      Relation: Sister          Name:               Age of Onset: (Not Specified)  Problem: Heart disease      Relation: Sister          Name:               Age of Onset: (Not Specified)  Problem: Alcohol abuse      Relation: Brother          Name:               Age of Onset: (Not Specified)  Problem: Hypertension      Relation: Brother          Name:               Age of Onset: (Not Specified)  Problem: Obesity      Relation: Brother          Name:               Age of Onset: (Not Specified)  Problem: Kidney disease      Relation: Son          Name:               Age of Onset: (Not Specified)  Problem: Diabetes      Relation: Son          Name:               Age of Onset: (Not Specified)  Problem: Crohn's disease      Relation: Son          Name:               Age of Onset: (Not Specified)      Current Outpatient Medications:  atorvastatin (LIPITOR) 80 MG tablet, Take 1 tablet (80 mg total) by mouth once daily., Disp: 30 tablet, Rfl: 11  clopidogreL (PLAVIX) 75 mg tablet, Take 1 tablet  (75 mg total) by mouth once daily., Disp: 30 tablet, Rfl: 3  DULCOLAX, BISACODYL, ORAL, Take 1 tablet by mouth once daily., Disp: , Rfl:    folic acid-vit B6-vit B12 2.5-25-2 mg (FOLBIC OR EQUIV) 2.5-25-2 mg Tab, Take 1 tablet by mouth once daily., Disp: 30 tablet, Rfl: 0  lamotrigine (LAMICTAL) 200 MG tablet, Take 1 tablet by mouth  daily, Disp: 30 tablet, Rfl: 5  LORazepam (ATIVAN) 0.5 MG tablet, Take 1 tablet (0.5 mg total) by mouth 2 (two) times daily., Disp: , Rfl:   metoprolol succinate (TOPROL-XL) 25 MG 24 hr tablet, Take 1 tablet (25 mg total) by mouth once daily., Disp: 90 tablet, Rfl: 3  nitroGLYCERIN (NITROSTAT) 0.4 MG SL tablet, Place 1 tablet (0.4 mg total) under the tongue every 5 (five) minutes as needed for Chest pain., Disp: 25 tablet, Rfl: 3  pantoprazole (PROTONIX) 40 MG tablet, Take 1 tablet (40 mg total) by mouth 2 (two) times daily for 60 days, THEN 1 tablet (40 mg total) once daily., Disp: 90 tablet, Rfl: 3    No current facility-administered medications for this visit.          Review of Systems   Constitutional: Negative.   HENT: Negative.     Eyes: Negative.    Cardiovascular:  Negative for chest pain, dyspnea on exertion, leg swelling, near-syncope, palpitations and syncope.   Respiratory: Negative.  Negative for shortness of breath.    Endocrine: Negative.    Hematologic/Lymphatic: Negative.    Skin: Negative.    Musculoskeletal: Negative.    Gastrointestinal: Negative.    Genitourinary: Negative.    Neurological: Negative.  Negative for dizziness and light-headedness.   Psychiatric/Behavioral: Negative.     Allergic/Immunologic: Negative.           Objective     Physical Exam  Vitals reviewed.   Constitutional:       General: He is not in acute distress.     Appearance: He is well-developed.   HENT:      Head: Normocephalic and atraumatic.   Eyes:      Pupils: Pupils are equal, round, and reactive to light.   Neck:      Thyroid: No thyromegaly.      Vascular: No JVD.   Cardiovascular:       Rate and Rhythm: Normal rate and regular rhythm.      Chest Wall: PMI is not displaced.      Heart sounds: Normal heart sounds, S1 normal and S2 normal. No murmur heard.     No friction rub. No gallop.   Pulmonary:      Effort: Pulmonary effort is normal. No respiratory distress.      Breath sounds: Normal breath sounds. No wheezing or rales.   Abdominal:      General: Bowel sounds are normal. There is no distension.      Palpations: Abdomen is soft.      Tenderness: There is no abdominal tenderness. There is no guarding or rebound.   Musculoskeletal:         General: No tenderness. Normal range of motion.      Cervical back: Normal range of motion.   Skin:     General: Skin is warm and dry.      Findings: No erythema or rash.   Neurological:      Mental Status: He is alert and oriented to person, place, and time.      Cranial Nerves: No cranial nerve deficit.   Psychiatric:         Behavior: Behavior normal.         Thought Content: Thought content normal.         Judgment: Judgment normal.            Assessment and Plan     1. S/P CABG (coronary artery bypass graft)    2. Syncope, unspecified syncope type        Plan:  69 yoM here for evaluation of syncope. His single syncope event was clearly associated with his significant GI bleed. Low suspicion for arrhythmic cause. RTC as needed

## 2025-07-01 ENCOUNTER — TELEPHONE (OUTPATIENT)
Dept: CARDIOTHORACIC SURGERY | Facility: CLINIC | Age: 70
End: 2025-07-01
Payer: MEDICARE

## 2025-07-01 ENCOUNTER — OFFICE VISIT (OUTPATIENT)
Dept: ELECTROPHYSIOLOGY | Facility: CLINIC | Age: 70
End: 2025-07-01
Payer: MEDICARE

## 2025-07-01 ENCOUNTER — HOSPITAL ENCOUNTER (OUTPATIENT)
Dept: CARDIOLOGY | Facility: CLINIC | Age: 70
Discharge: HOME | End: 2025-07-01
Payer: MEDICARE

## 2025-07-01 VITALS
DIASTOLIC BLOOD PRESSURE: 82 MMHG | HEIGHT: 68 IN | BODY MASS INDEX: 19.84 KG/M2 | HEART RATE: 56 BPM | SYSTOLIC BLOOD PRESSURE: 130 MMHG | WEIGHT: 130.94 LBS

## 2025-07-01 DIAGNOSIS — Z95.1 S/P CABG (CORONARY ARTERY BYPASS GRAFT): ICD-10-CM

## 2025-07-01 DIAGNOSIS — I50.20 HFREF (HEART FAILURE WITH REDUCED EJECTION FRACTION): ICD-10-CM

## 2025-07-01 DIAGNOSIS — I25.10 CORONARY ARTERY DISEASE INVOLVING NATIVE HEART WITHOUT ANGINA PECTORIS, UNSPECIFIED VESSEL OR LESION TYPE: ICD-10-CM

## 2025-07-01 DIAGNOSIS — I50.20 HFREF (HEART FAILURE WITH REDUCED EJECTION FRACTION): Primary | ICD-10-CM

## 2025-07-01 DIAGNOSIS — R55 SYNCOPE, UNSPECIFIED SYNCOPE TYPE: ICD-10-CM

## 2025-07-01 LAB
OHS QRS DURATION: 88 MS
OHS QTC CALCULATION: 414 MS

## 2025-07-01 PROCEDURE — 99204 OFFICE O/P NEW MOD 45 MIN: CPT | Mod: S$GLB,,, | Performed by: INTERNAL MEDICINE

## 2025-07-01 PROCEDURE — 1126F AMNT PAIN NOTED NONE PRSNT: CPT | Mod: CPTII,S$GLB,, | Performed by: INTERNAL MEDICINE

## 2025-07-01 PROCEDURE — 1101F PT FALLS ASSESS-DOCD LE1/YR: CPT | Mod: CPTII,S$GLB,, | Performed by: INTERNAL MEDICINE

## 2025-07-01 PROCEDURE — 93010 ELECTROCARDIOGRAM REPORT: CPT | Mod: S$GLB,,, | Performed by: INTERNAL MEDICINE

## 2025-07-01 PROCEDURE — 3044F HG A1C LEVEL LT 7.0%: CPT | Mod: CPTII,S$GLB,, | Performed by: INTERNAL MEDICINE

## 2025-07-01 PROCEDURE — 3008F BODY MASS INDEX DOCD: CPT | Mod: CPTII,S$GLB,, | Performed by: INTERNAL MEDICINE

## 2025-07-01 PROCEDURE — 3075F SYST BP GE 130 - 139MM HG: CPT | Mod: CPTII,S$GLB,, | Performed by: INTERNAL MEDICINE

## 2025-07-01 PROCEDURE — 3079F DIAST BP 80-89 MM HG: CPT | Mod: CPTII,S$GLB,, | Performed by: INTERNAL MEDICINE

## 2025-07-01 PROCEDURE — 1159F MED LIST DOCD IN RCRD: CPT | Mod: CPTII,S$GLB,, | Performed by: INTERNAL MEDICINE

## 2025-07-01 PROCEDURE — 3066F NEPHROPATHY DOC TX: CPT | Mod: CPTII,S$GLB,, | Performed by: INTERNAL MEDICINE

## 2025-07-01 PROCEDURE — 99999 PR PBB SHADOW E&M-EST. PATIENT-LVL III: CPT | Mod: PBBFAC,,, | Performed by: INTERNAL MEDICINE

## 2025-07-01 PROCEDURE — 3288F FALL RISK ASSESSMENT DOCD: CPT | Mod: CPTII,S$GLB,, | Performed by: INTERNAL MEDICINE

## 2025-07-01 PROCEDURE — 3061F NEG MICROALBUMINURIA REV: CPT | Mod: CPTII,S$GLB,, | Performed by: INTERNAL MEDICINE

## 2025-07-01 NOTE — TELEPHONE ENCOUNTER
"Called pt to inquire how many tablets of Plavix he has remaining prior to following up with Interventional Cardiology, to determine if CTS needs to submit a refill, per pt's request.  Pt reported that he has 4 - 5 tablets of Plavix remaining.  Pt informed to speak to his Interventional Cardiologist about the need to continue Plavix when he sees him in clinic on July 3, which he verbalized understanding to.  Pt denied having any further questions at this time.       ----- Message from ELLIE Sutton sent at 7/1/2025 12:05 PM CDT -----  Regarding: RE: post surgery CABG  Not sure the amount of plavix left.  I made the appointment today because he showed up here asking for the appointment.    We will see after Thursday.    jae  ----- Message -----  From: Carmen Weinstein RN  Sent: 7/1/2025  11:45 AM CDT  To: Jae Louis RN  Subject: RE: post surgery CABG                            René Sutton,    I informed Dr. Leal's PA of your message and she stated that Dr. Latham noted on 4/3, "His LAD and right coronary artery were able to be bypassed but his left circumflex was not.  His postoperative course was complicated with GI bleeding ultimately was treated with a proton pump inhibitor.  He has had no bleeding since this was initiated. He reports shortness of breath that is actually worse than prior to his bypass.  He has not started cardiac rehab.  His last ejection fraction was around 40-45% and he is not on an ARB or beta-blocker. We are going to start a low-dose beta blocker and have him start with cardiac rehab I am interested to see what his symptoms do over the coming months.  Also check an echocardiogram.  If his ejection fraction remains depressed and or his symptoms of shortness of breath do not improve, we will plan to revascularize his circumflex".    Dr. Leal kept him on Plavix, until he followed up with Dr. Latham, regarding his Circumflex.    It appears that Mr. Partida is scheduled to follow up with Dr. Latham on " Thursday (7/3) to revisit the possibility of revascularizing his Circumflex, which would determine whether or not he would remain on Plavix.  Did Mr. Partida say how many tablets he has left?  If he has a few, he may not need a refill.    Thank youCarmen  ----- Message -----  From: Jae Louis RN  Sent: 7/1/2025  11:24 AM CDT  To: [facility] Staff  Subject: post surgery CABG                                Pt came by the office today,  requesting refill on PLAVIX.  Can you assist patient?    I can see he has active rx at [facility] and good until feb 2026 however patient states pharmacy told him he has no active refills.    Should he continue after CABG?      Thank you  jae

## 2025-07-02 ENCOUNTER — TELEPHONE (OUTPATIENT)
Dept: CARDIOLOGY | Facility: CLINIC | Age: 70
End: 2025-07-02
Payer: MEDICARE

## 2025-07-02 NOTE — TELEPHONE ENCOUNTER
Noted repeat ECHO appt was cancelled.  Rescheduled ECHO for post clinic appointment, soonest was 7/9/25.

## 2025-07-03 ENCOUNTER — OFFICE VISIT (OUTPATIENT)
Dept: CARDIOLOGY | Facility: CLINIC | Age: 70
End: 2025-07-03
Payer: MEDICARE

## 2025-07-03 VITALS
BODY MASS INDEX: 20.52 KG/M2 | SYSTOLIC BLOOD PRESSURE: 130 MMHG | DIASTOLIC BLOOD PRESSURE: 69 MMHG | OXYGEN SATURATION: 98 % | WEIGHT: 135.38 LBS | HEART RATE: 58 BPM | HEIGHT: 68 IN

## 2025-07-03 DIAGNOSIS — I50.20 HFREF (HEART FAILURE WITH REDUCED EJECTION FRACTION): Primary | ICD-10-CM

## 2025-07-03 DIAGNOSIS — I25.10 CORONARY ARTERY DISEASE INVOLVING NATIVE HEART WITHOUT ANGINA PECTORIS, UNSPECIFIED VESSEL OR LESION TYPE: ICD-10-CM

## 2025-07-03 PROCEDURE — 99214 OFFICE O/P EST MOD 30 MIN: CPT | Mod: S$GLB,,, | Performed by: INTERNAL MEDICINE

## 2025-07-03 PROCEDURE — 1125F AMNT PAIN NOTED PAIN PRSNT: CPT | Mod: CPTII,S$GLB,, | Performed by: INTERNAL MEDICINE

## 2025-07-03 PROCEDURE — 1159F MED LIST DOCD IN RCRD: CPT | Mod: CPTII,S$GLB,, | Performed by: INTERNAL MEDICINE

## 2025-07-03 PROCEDURE — 3066F NEPHROPATHY DOC TX: CPT | Mod: CPTII,S$GLB,, | Performed by: INTERNAL MEDICINE

## 2025-07-03 PROCEDURE — 99999 PR PBB SHADOW E&M-EST. PATIENT-LVL IV: CPT | Mod: PBBFAC,,, | Performed by: INTERNAL MEDICINE

## 2025-07-03 PROCEDURE — 3044F HG A1C LEVEL LT 7.0%: CPT | Mod: CPTII,S$GLB,, | Performed by: INTERNAL MEDICINE

## 2025-07-03 PROCEDURE — 3288F FALL RISK ASSESSMENT DOCD: CPT | Mod: CPTII,S$GLB,, | Performed by: INTERNAL MEDICINE

## 2025-07-03 PROCEDURE — 4010F ACE/ARB THERAPY RXD/TAKEN: CPT | Mod: CPTII,S$GLB,, | Performed by: INTERNAL MEDICINE

## 2025-07-03 PROCEDURE — 3078F DIAST BP <80 MM HG: CPT | Mod: CPTII,S$GLB,, | Performed by: INTERNAL MEDICINE

## 2025-07-03 PROCEDURE — 3008F BODY MASS INDEX DOCD: CPT | Mod: CPTII,S$GLB,, | Performed by: INTERNAL MEDICINE

## 2025-07-03 PROCEDURE — 1100F PTFALLS ASSESS-DOCD GE2>/YR: CPT | Mod: CPTII,S$GLB,, | Performed by: INTERNAL MEDICINE

## 2025-07-03 PROCEDURE — 3061F NEG MICROALBUMINURIA REV: CPT | Mod: CPTII,S$GLB,, | Performed by: INTERNAL MEDICINE

## 2025-07-03 PROCEDURE — 3075F SYST BP GE 130 - 139MM HG: CPT | Mod: CPTII,S$GLB,, | Performed by: INTERNAL MEDICINE

## 2025-07-03 RX ORDER — VALSARTAN 80 MG/1
80 TABLET ORAL DAILY
Qty: 90 TABLET | Refills: 3 | Status: SHIPPED | OUTPATIENT
Start: 2025-07-03 | End: 2025-07-03

## 2025-07-03 RX ORDER — VALSARTAN 80 MG/1
80 TABLET ORAL DAILY
Qty: 90 TABLET | Refills: 3 | Status: SHIPPED | OUTPATIENT
Start: 2025-07-03 | End: 2026-07-03

## 2025-07-03 NOTE — PROGRESS NOTES
Interventional Cardiology Clinic Note  Reason for Visit: CAD Follow up    HPI:   Mr. Partida is a 69 y.o. male with past medical history of CAD s/p CABG 2/21/25 with Dr. Leal (NINO-LAD and SVG- distal RCA), HFmrEF (45-50%), epilepsy, HTN, HLD, gastric bypass with recent GI bleed from gastrojejunostomy anastomosis s/p clipping 3/10/25 here for follow up. Patient initially was undergoing evaluation to be a kidney donor. Found to have MVD on stress testing. He then presented to ED in February of 2025 due to anxiety about his stress findings, he was asymptomatic with no chest pain or VARNER at that time. Underwent LHC which found prox LAD, prox RCA, and Cx disease. He then underwent bypass with Dr. Leal. LCx was not bypassed. He was later admitted for GI bleed while on DAPT; underwent clipping and was transitioned to only plavix (ASA held).  Last visit in April, patient noted significant VARNER which he stated was worse than prior to his CABG, was initiated on metoprolol for his underlying heart failure and advised cardiac rehab.  Today, the patient reports that his symptoms have significantly improved, has completed 36 sessions of cardiac rehab. He exercises routinely and feels very close to his baseline. Denies any chest pain, orthopnea, LE edema, chest discomfort, palpitations, lightheadedness, and syncope.     Medical problems  - CAD  - s/p CABG  - Hypertension  - Hyperlipidemia  - s/p gastric bypass      ROS:    Constitution: Negative for fever, chills, weight loss or gain.   HENT: Negative for sore throat, rhinorrhea, or headache.  Eyes: Negative for blurred or double vision.   Cardiovascular: See above  Pulmonary: Negative for SOB   Gastrointestinal: Negative for abdominal pain, nausea, vomiting, or diarrhea.   : Negative for dysuria.   Neurological: Negative for focal weakness or sensory changes.  PMH:     Past Medical History:   Diagnosis Date    Hyperlipidemia     Pyloric stenosis, congenital     s/p repair     Seizures      Past Surgical History:   Procedure Laterality Date    CORONARY ANGIOGRAPHY Right 2025    Procedure: ANGIOGRAM, CORONARY ARTERY;  Surgeon: Ari Latham III, MD;  Location: [facility] CATH LAB;  Service: Cardiology;  Laterality: Right;    CORONARY ARTERY BYPASS GRAFT (CABG) N/A 2025    Procedure: CORONARY ARTERY BYPASS GRAFT (CABG);  Surgeon: Flavio Leal MD;  [facility];  Service: Cardiovascular;  Laterality: N/A;  CABG x 2  LIMA TO LAD  SVG TO RCA    ENDOSCOPIC HARVEST OF VEIN Left 2025    Procedure: HARVEST-VEIN-ENDOVASCULAR;  Surgeon: Flavio Leal MD;  [facility];  Service: Cardiovascular;  Laterality: Left;  VEIN START-1043  VEIN OUT-1112  VEIN PREP START-1113  VEIN READY-1131    ESOPHAGOGASTRODUODENOSCOPY N/A 3/10/2025    Procedure: EGD (ESOPHAGOGASTRODUODENOSCOPY);  Surgeon: Oneil Key MD;  [facility]O ([facility]);  Service: Endoscopy;  Laterality: N/A;    pyloric stenosis      TONSILLECTOMY       Allergies:     Review of patient's allergies indicates:   Allergen Reactions    Antihistamines - alkylamine Other (See Comments)     Heart race    Codeine Other (See Comments)     Heart race     Medications:   Medications Ordered Prior to Encounter[1]  Social History:     Social History     Tobacco Use    Smoking status: Former     Current packs/day: 0.00     Types: Cigarettes     Quit date:      Years since quittin.5    Smokeless tobacco: Never   Substance Use Topics    Alcohol use: No     Family History:     Family History   Problem Relation Name Age of Onset    Cancer Mother          breast ca    Arthritis Mother      Hypertension Mother      Cancer Father          prostate ca    Alcohol abuse Sister      Hypertension Sister      Obesity Sister      Heart disease Sister      Alcohol abuse Brother      Hypertension Brother      Obesity Brother      Kidney disease Son      Diabetes Son      Crohn's disease Son       Physical Exam:   BP  "130/69 (BP Location: Left arm, Patient Position: Sitting)   Pulse (!) 58   Ht 5' 8" (1.727 m)   Wt 61.4 kg (135 lb 5.8 oz)   SpO2 98%   BMI 20.58 kg/m²    Wt Readings from Last 4 Encounters:   07/03/25 61.4 kg (135 lb 5.8 oz)   07/01/25 59.4 kg (130 lb 15.3 oz)   04/28/25 59.4 kg (130 lb 15.3 oz)   04/11/25 58.8 kg (129 lb 10.1 oz)       Physical Exam  Constitutional: No distress, obese, conversant  HEENT: Sclera anicteric, PERRLA  Neck: No JVD, no masses, good movement  CV: RRR, S1 and S2 normal, no additional heart sounds or murmurs. Pulses 2+ and equal bilaterally in radial arteries and femoral, DP and PT areas bilaterally  Pulm: Clear to auscultation bilaterally with symmetrical expansion.   GI: Abdomen soft, non-tender, good bowel sounds  Extremities: Both extremities intact and grossly normal, skin is warm, no edema noted  Skin: No ecchymosis, erythema, or ulcers  Psych: AOx3, appropriate affect  Neuro: CNII-XII intact, no focal deficits      Labs:     Lab Results   Component Value Date     05/04/2025     03/21/2025    K 4.4 05/04/2025    K 4.6 03/21/2025     03/21/2025    CO2 29 05/04/2025    CO2 24 03/21/2025    BUN 17.0 05/04/2025    BUN 12 03/21/2025    CREATININE 1.04 05/04/2025    CREATININE 1.0 03/21/2025    GLUCOSE 88 05/04/2025    ANIONGAP 6 (L) 05/04/2025    ANIONGAP 10 03/21/2025     Lab Results   Component Value Date    HGBA1C 5.2 01/29/2025     Lab Results   Component Value Date    BNP 95 04/28/2025     (H) 03/09/2025    BNP 78 02/19/2025    Lab Results   Component Value Date    WBC 6.38 04/28/2025    HGB 11.4 (L) 04/28/2025    HGB 10.2 (L) 03/21/2025    HCT 38.5 (L) 04/28/2025    HCT 33.0 (L) 03/21/2025    HCT 23 (L) 03/08/2025     04/28/2025     03/21/2025    GRAN 6.4 03/20/2025    GRAN 74.6 (H) 03/20/2025     Lab Results   Component Value Date    CHOL 169 01/29/2025    HDL 70 01/29/2025    LDLCALC 79.0 01/29/2025    TRIG 100 01/29/2025      "     Imaging:      EF   Date Value Ref Range Status   01/31/2025 50 % Final       EKG   Results for orders placed or performed during the hospital encounter of 03/24/25   EKG 12-lead    Collection Time: 03/24/25 11:12 AM   Result Value Ref Range    QRS Duration 86 ms    [facility] QTC Calculation 433 ms    Narrative    Test Reason : Z95.1,    Vent. Rate : 101 BPM     Atrial Rate : 101 BPM     P-R Int : 132 ms          QRS Dur :  86 ms      QT Int : 334 ms       P-R-T Axes :  78  81  -9 degrees    QTcB Int : 433 ms    Sinus tachycardia  Right atrial enlargement  Cannot rule out Inferior infarct ,age undetermined  Abnormal ECG  When compared with ECG of 20-Mar-2025 08:52,  Inverted T waves have replaced nonspecific T wave abnormality in Inferior  leads  T wave inversion less evident in Lateral leads  Confirmed by Bassam Mercado ([id number]) on 3/24/2025 3:17:10 PM    Referred By: SHAY LANCASTER           Confirmed By: Bassam Mercado        TTE    Echo Saline Bubble? No  Result Date: 4/11/2025    Left Ventricle: The left ventricle is normal in size. Normal wall   thickness. Regional wall motion abnormalities present. There is mildly   reduced systolic function with a visually estimated ejection fraction of   45 - 50%.    Right Ventricle: The right ventricle is normal in size. Wall thickness   is normal. Systolic function is normal.    Pulmonary Artery: The estimated pulmonary artery systolic pressure is   16 mmHg.    IVC/SVC: Normal venous pressure at 3 mmHg.        Echo  Result Date: 2/20/2025    Left Ventricle: The left ventricle is normal in size. Ventricular mass   is normal. Normal wall thickness. Regional wall motion abnormalities   present in RCA and LCX distribution. See diagram for wall motion findings.   There is mildly reduced systolic function with a visually estimated   ejection fraction of 40 - 50%. Quantitated ejection fraction is 47%.   Global longitudinal strain is -14.0%. There is normal diastolic  function.    Right Ventricle: Normal right ventricular cavity size. Wall thickness   is normal. Systolic function is normal.    Left Atrium: Left atrium is mildly dilated. The left atrium volume   index is 36 mL/m2.    Mitral Valve: There is mild regurgitation with a centrally directed   jet.    Aorta: Aortic root is normal in size measuring 3.68 cm. Ascending aorta   is mildly dilated measuring 3.77 cm.    IVC/SVC: Normal venous pressure at 3 mmHg.        Stress Echo Which stress agent will be used? Treadmill Exercise; Color Flow Doppler? Yes  Result Date: 1/31/2025    Left Ventricle: The left ventricle is normal in size. Ventricular mass   is normal. Normal wall thickness. Regional wall motion abnormalities   present. See diagram for wall motion findings. There is low normal to   slightly reduced  systolic function with a visually estimated ejection   fraction of 50 - 55%. Ejection fraction is approximately 50%. There is   normal diastolic function.    Right Ventricle: Normal right ventricular cavity size. Wall thickness   is normal. Systolic function is normal.    Mitral Valve: There is mild bileaflet sclerosis. There is mild   regurgitation.    Pulmonic Valve: There is mild regurgitation.    IVC/SVC: Normal venous pressure at 3 mmHg.    Baseline ECG: The Baseline ECG reveals sinus rhythm. The axis is   normal. The ST segments are normal.    Stress ECG: There is 1.5 mm of downward-sloping ST segment depression   in the inferolateral leads (II, III, aVF, V5 and V6) noted during stress.   There are no arrhythmias during stress. There is normal blood pressure   response with stress.    ECG Conclusion: The ECG portion of the study is positive for ischemia.    Post-stress Echo: The left ventricle systolic function is mildly   decreased with an EF of 43%. The post-stress echo showed wall motion   abnormalities compared to baseline. Right ventricle systolic function is   normal.    Post-stress Conclusion: The study is  consistent with ischemia and   consistent with scar and probably 3 vessel CAD.            Coronary Angiography 2/19/25    The Prox RCA lesion was 99% stenosed.    The Mid Cx lesion was 90% stenosed.    The Prox LAD to Mid LAD lesion was 80% stenosed.    There was three vessel coronary artery disease.    Assessment and Plan:      Coronary artery disease without angina pectoris  LIMA to LAD, SVG to RCA, Cx not revascularized   VARNER significantly improved, no angina    Plan-  -No plan of LCX vascularization at present given improved symptoms.  -continue plavix 75mg OD.  -will add low dose valsartan 80mg OD for his underlying heart failure.  -continue toprol 25mg OD.  -will schedule repeat TTE to evaluate ejection fraction.  -continue HI statin, goal LDL <70.    HFrEF (heart failure with reduced ejection fraction)  Echo in April with EF 45-50%, secondary to ischemic cardiomyopathy.  Currently on beta blockers, symptoms improved  Denies VARNER, pedal edema, PND/orthopnea    Plan-  -continue toprol 25mg and add valsartan 80mg OD.  -will repeat TTE.      PREVENTION   Educated and strongly counseled on smoking cessation.   Adopt a heart healthy diet  Counseled on various heart healthy diets  -Mediterranean diet- High in fruits vegetable, grains, fish, nuts and extra-virgin olive oil. Minimize processed, salty foods, packaged foods.  -Dash diet to lower HTN-Similar to mediterranean diet, low fat dairy, meatless meals and low sodium  -Plant based diet: Foundation being minimally processed and whole foods.   -Avoid foods in saturated fats and trans fats.   Aerobic exercise 30 minutes 5 times/ week.   Reduce and limit alcohol intake.   Learn stress management / relaxation techniques such as mindfulness and meditation.   Encourage self and family members to adopt healthy choices at a young age.     COMMUNICATION   Patients are encouraged to call clinic if they have any questions or concerns.   Clinic line is not for emergency  purposes.  If they are feeling unwell and especially if they have certain red flag symptoms such as new or worsening  chest pain, chest pressure, shortness of breath, palpitations, dizziness, fall (especially on anticoagulants),   low blood pressure, focal weakness, etc., they should go to the emergency room.       Case discussed with Dr. Latham.    Signed:  Aydee Mcguire M.D.  Cardiology Fellow  [facility]  7/3/2025 4:27 PM            [1]   Current Outpatient Medications on File Prior to Visit   Medication Sig Dispense Refill    atorvastatin (LIPITOR) 80 MG tablet Take 1 tablet (80 mg total) by mouth once daily. 30 tablet 11    clopidogreL (PLAVIX) 75 mg tablet Take 1 tablet (75 mg total) by mouth once daily. 30 tablet 3    DULCOLAX, BISACODYL, ORAL Take 1 tablet by mouth once daily.      folic acid-vit B6-vit B12 2.5-25-2 mg (FOLBIC OR EQUIV) 2.5-25-2 mg Tab Take 1 tablet by mouth once daily. 30 tablet 0    lamotrigine (LAMICTAL) 200 MG tablet Take 1 tablet by mouth  daily 30 tablet 5    LORazepam (ATIVAN) 0.5 MG tablet Take 1 tablet (0.5 mg total) by mouth 2 (two) times daily.      metoprolol succinate (TOPROL-XL) 25 MG 24 hr tablet Take 1 tablet (25 mg total) by mouth once daily. 90 tablet 3    pantoprazole (PROTONIX) 40 MG tablet Take 1 tablet (40 mg total) by mouth 2 (two) times daily for 60 days, THEN 1 tablet (40 mg total) once daily. 90 tablet 3    nitroGLYCERIN (NITROSTAT) 0.4 MG SL tablet Place 1 tablet (0.4 mg total) under the tongue every 5 (five) minutes as needed for Chest pain. 25 tablet 3     No current facility-administered medications on file prior to visit.

## 2025-07-03 NOTE — ASSESSMENT & PLAN NOTE
LIMA to LAD, SVG to RCA, Cx not revascularized   VARNER significantly improved, no angina    Plan-  -No plan of LCX vascularization at present given improved symptoms.  -continue plavix 75mg OD.  -will add low dose valsartan 80mg OD for his underlying heart failure.  -continue toprol 25mg OD.  -will schedule repeat TTE to evaluate ejection fraction.  -continue HI statin, goal LDL <70.

## 2025-07-03 NOTE — ASSESSMENT & PLAN NOTE
Echo in April with EF 45-50%, secondary to ischemic cardiomyopathy.  Currently on beta blockers, symptoms improved  Denies VARNER, pedal edema, PND/orthopnea    Plan-  -continue toprol 25mg and add valsartan 80mg OD.  -will repeat TTE.

## 2025-07-07 ENCOUNTER — TELEPHONE (OUTPATIENT)
Dept: CARDIOLOGY | Facility: CLINIC | Age: 70
End: 2025-07-07
Payer: MEDICARE

## 2025-07-07 DIAGNOSIS — I50.20 HFREF (HEART FAILURE WITH REDUCED EJECTION FRACTION): Primary | ICD-10-CM

## 2025-07-07 DIAGNOSIS — K92.2 UPPER GASTROINTESTINAL HEMORRHAGE: Primary | ICD-10-CM

## 2025-07-07 NOTE — TELEPHONE ENCOUNTER
Follow up with patient -  pt voiced he was to have GI referral and needs his Plavix refilled as Dr Latham stated he would fill.  Rx called to patient pharmacy of choice [facility].  Plavix 75mg one tablet once daily #90, 4 refills.  Follow up after speaking with Dr Latham on specialist physician wanting for patient referral.  Pt voiced understanding.

## 2025-07-09 ENCOUNTER — HOSPITAL ENCOUNTER (OUTPATIENT)
Dept: CARDIOLOGY | Facility: HOSPITAL | Age: 70
Discharge: HOME | End: 2025-07-09
Attending: INTERNAL MEDICINE
Payer: MEDICARE

## 2025-07-09 VITALS
BODY MASS INDEX: 20.46 KG/M2 | WEIGHT: 135 LBS | SYSTOLIC BLOOD PRESSURE: 90 MMHG | HEART RATE: 54 BPM | DIASTOLIC BLOOD PRESSURE: 54 MMHG | HEIGHT: 68 IN

## 2025-07-09 DIAGNOSIS — I25.10 CORONARY ARTERY DISEASE INVOLVING NATIVE HEART WITHOUT ANGINA PECTORIS, UNSPECIFIED VESSEL OR LESION TYPE: ICD-10-CM

## 2025-07-09 DIAGNOSIS — I50.20 HFREF (HEART FAILURE WITH REDUCED EJECTION FRACTION): ICD-10-CM

## 2025-07-09 PROCEDURE — 93306 TTE W/DOPPLER COMPLETE: CPT

## 2025-07-09 PROCEDURE — 93306 TTE W/DOPPLER COMPLETE: CPT | Mod: 26,,, | Performed by: INTERNAL MEDICINE

## 2025-07-10 ENCOUNTER — TELEPHONE (OUTPATIENT)
Dept: CARDIOLOGY | Facility: CLINIC | Age: 70
End: 2025-07-10
Payer: MEDICARE

## 2025-07-10 LAB
AORTIC SIZE INDEX (SOV): 2 CM/M2
AORTIC SIZE INDEX: 2.2 CM/M2
ASCENDING AORTA: 3.8 CM
AV AREA BY CONTINUOUS VTI: 3 CM2
AV INDEX (PROSTH): 0.82
AV LVOT MEAN GRADIENT: 2 MMHG
AV LVOT PEAK GRADIENT: 4 MMHG
AV MEAN GRADIENT: 4 MMHG
AV PEAK GRADIENT: 8 MMHG
AV REGURGITATION PRESSURE HALF TIME: 811 MS
AV VALVE AREA BY VELOCITY RATIO: 3 CM²
AV VALVE AREA: 3.1 CM2
AV VELOCITY RATIO: 0.79
BSA FOR ECHO PROCEDURE: 1.71 M2
CV ECHO LV RWT: 0.22 CM
DOP CALC AO PEAK VEL: 1.4 M/S
DOP CALC AO VTI: 31.3 CM
DOP CALC LVOT AREA: 3.8 CM2
DOP CALC LVOT DIAMETER: 2.2 CM
DOP CALC LVOT PEAK VEL: 1.1 M/S
DOP CALC LVOT STROKE VOLUME: 97.3 CM3
DOP CALCLVOT PEAK VEL VTI: 25.6 CM
E WAVE DECELERATION TIME: 160 MS
E/A RATIO: 1.16
E/E' RATIO: 10 M/S
ECHO EF ESTIMATED: 59 %
ECHO LV POSTERIOR WALL: 0.6 CM (ref 0.6–1.1)
FRACTIONAL SHORTENING: 30.9 % (ref 28–44)
INTERVENTRICULAR SEPTUM: 0.8 CM (ref 0.6–1.1)
IVC DIAMETER: 1.51 CM
LA MAJOR: 4.9 CM
LA MINOR: 5.3 CM
LA WIDTH: 3.6 CM
LEFT ATRIUM SIZE: 3.4 CM
LEFT ATRIUM VOLUME INDEX MOD: 34 ML/M2
LEFT ATRIUM VOLUME INDEX: 31 ML/M2
LEFT ATRIUM VOLUME MOD: 59 ML
LEFT ATRIUM VOLUME: 53 CM3
LEFT INTERNAL DIMENSION IN SYSTOLE: 3.8 CM (ref 2.1–4)
LEFT VENTRICLE DIASTOLIC VOLUME INDEX: 85.55 ML/M2
LEFT VENTRICLE DIASTOLIC VOLUME: 148 ML
LEFT VENTRICLE MASS INDEX: 78.3 G/M2
LEFT VENTRICLE SYSTOLIC VOLUME INDEX: 35.3 ML/M2
LEFT VENTRICLE SYSTOLIC VOLUME: 61 ML
LEFT VENTRICULAR INTERNAL DIMENSION IN DIASTOLE: 5.5 CM (ref 3.5–6)
LEFT VENTRICULAR MASS: 135.5 G
LV LATERAL E/E' RATIO: 6.7
LV SEPTAL E/E' RATIO: 16.8
MV A" WAVE DURATION": 88.49 MS
MV PEAK A VEL: 0.58 M/S
MV PEAK E VEL: 0.67 M/S
OHS CV RV/LV RATIO: 0.6 CM
PISA TR MAX VEL: 1.9 M/S
PULM VEIN A" WAVE DURATION": 88.49 MS
PULM VEIN S/D RATIO: 1.58
PULMONIC VEIN PEAK A VELOCITY: 0.3 M/S
PV PEAK D VEL: 0.45 M/S
PV PEAK S VEL: 0.71 M/S
RA MAJOR: 5.04 CM
RA PRESSURE ESTIMATED: 3 MMHG
RA WIDTH: 3.9 CM
RIGHT ATRIAL AREA: 16.1 CM2
RIGHT VENTRICLE DIASTOLIC BASEL DIMENSION: 3.3 CM
RV TB RVSP: 5 MMHG
RV TISSUE DOPPLER FREE WALL SYSTOLIC VELOCITY 1 (APICAL 4 CHAMBER VIEW): 8.26 CM/S
SINUS: 3.5 CM
STJ: 3.2 CM
TDI LATERAL: 0.1 M/S
TDI SEPTAL: 0.04 M/S
TDI: 0.07 M/S
TRICUSPID ANNULAR PLANE SYSTOLIC EXCURSION: 1.6 CM
TV PEAK GRADIENT: 15 MMHG
TV REST PULMONARY ARTERY PRESSURE: 17 MMHG
Z-SCORE OF LEFT VENTRICULAR DIMENSION IN END DIASTOLE: 1.38
Z-SCORE OF LEFT VENTRICULAR DIMENSION IN END SYSTOLE: 1.96

## 2025-07-10 NOTE — TELEPHONE ENCOUNTER
Pt returning call from office.  Discussed with patient, message from Dr Latham after review of his ECHO, pt instructed to keep up the good work.  Pt voiced he continues with cardiac rehab and very encouraged with his improvements.

## 2025-09-02 ENCOUNTER — LAB VISIT (OUTPATIENT)
Dept: LAB | Facility: HOSPITAL | Age: 70
End: 2025-09-02
Payer: MEDICARE

## 2025-09-02 ENCOUNTER — OFFICE VISIT (OUTPATIENT)
Dept: GASTROENTEROLOGY | Facility: CLINIC | Age: 70
End: 2025-09-02
Payer: MEDICARE

## 2025-09-02 VITALS
SYSTOLIC BLOOD PRESSURE: 131 MMHG | HEIGHT: 68 IN | DIASTOLIC BLOOD PRESSURE: 84 MMHG | HEART RATE: 64 BPM | BODY MASS INDEX: 20.31 KG/M2 | WEIGHT: 134 LBS

## 2025-09-02 DIAGNOSIS — D64.9 ANEMIA, UNSPECIFIED TYPE: ICD-10-CM

## 2025-09-02 DIAGNOSIS — Z87.19 HISTORY OF PYLORIC STENOSIS: ICD-10-CM

## 2025-09-02 DIAGNOSIS — Z87.19 HISTORY OF GI BLEED: ICD-10-CM

## 2025-09-02 DIAGNOSIS — D64.9 ANEMIA, UNSPECIFIED TYPE: Primary | ICD-10-CM

## 2025-09-02 LAB
ABSOLUTE EOSINOPHIL (OHS): 0.34 K/UL
ABSOLUTE MONOCYTE (OHS): 0.85 K/UL (ref 0.3–1)
ABSOLUTE NEUTROPHIL COUNT (OHS): 5.14 K/UL (ref 1.8–7.7)
ANISOCYTOSIS BLD QL SMEAR: SLIGHT
BASOPHILS # BLD AUTO: 0.04 K/UL
BASOPHILS NFR BLD AUTO: 0.5 %
BURR CELLS BLD QL SMEAR: NORMAL
ERYTHROCYTE [DISTWIDTH] IN BLOOD BY AUTOMATED COUNT: 22.4 % (ref 11.5–14.5)
HCT VFR BLD AUTO: 36.1 % (ref 40–54)
HGB BLD-MCNC: 11 GM/DL (ref 14–18)
IMM GRANULOCYTES # BLD AUTO: 0.01 K/UL (ref 0–0.04)
IMM GRANULOCYTES NFR BLD AUTO: 0.1 % (ref 0–0.5)
LYMPHOCYTES # BLD AUTO: 1.31 K/UL (ref 1–4.8)
MCH RBC QN AUTO: 22.4 PG (ref 27–31)
MCHC RBC AUTO-ENTMCNC: 30.5 G/DL (ref 32–36)
MCV RBC AUTO: 74 FL (ref 82–98)
NUCLEATED RBC (/100WBC) (OHS): 0 /100 WBC
OVALOCYTES BLD QL SMEAR: NORMAL
PLATELET # BLD AUTO: 300 K/UL (ref 150–450)
PMV BLD AUTO: 10.2 FL (ref 9.2–12.9)
POIKILOCYTOSIS BLD QL SMEAR: SLIGHT
RBC # BLD AUTO: 4.91 M/UL (ref 4.6–6.2)
RELATIVE EOSINOPHIL (OHS): 4.4 %
RELATIVE LYMPHOCYTE (OHS): 17 % (ref 18–48)
RELATIVE MONOCYTE (OHS): 11.1 % (ref 4–15)
RELATIVE NEUTROPHIL (OHS): 66.9 % (ref 38–73)
SPHEROCYTES BLD QL SMEAR: NORMAL
WBC # BLD AUTO: 7.69 K/UL (ref 3.9–12.7)

## 2025-09-02 PROCEDURE — 85025 COMPLETE CBC W/AUTO DIFF WBC: CPT

## 2025-09-02 PROCEDURE — 99214 OFFICE O/P EST MOD 30 MIN: CPT | Mod: S$GLB,,,

## 2025-09-02 PROCEDURE — 99999 PR PBB SHADOW E&M-EST. PATIENT-LVL IV: CPT | Mod: PBBFAC,,,

## 2025-09-02 PROCEDURE — 3061F NEG MICROALBUMINURIA REV: CPT | Mod: CPTII,S$GLB,,

## 2025-09-02 PROCEDURE — 3075F SYST BP GE 130 - 139MM HG: CPT | Mod: CPTII,S$GLB,,

## 2025-09-02 PROCEDURE — 3044F HG A1C LEVEL LT 7.0%: CPT | Mod: CPTII,S$GLB,,

## 2025-09-02 PROCEDURE — 3008F BODY MASS INDEX DOCD: CPT | Mod: CPTII,S$GLB,,

## 2025-09-02 PROCEDURE — 3066F NEPHROPATHY DOC TX: CPT | Mod: CPTII,S$GLB,,

## 2025-09-02 PROCEDURE — 1125F AMNT PAIN NOTED PAIN PRSNT: CPT | Mod: CPTII,S$GLB,,

## 2025-09-02 PROCEDURE — 36415 COLL VENOUS BLD VENIPUNCTURE: CPT

## 2025-09-02 PROCEDURE — 1159F MED LIST DOCD IN RCRD: CPT | Mod: CPTII,S$GLB,,

## 2025-09-02 PROCEDURE — 4010F ACE/ARB THERAPY RXD/TAKEN: CPT | Mod: CPTII,S$GLB,,

## 2025-09-02 PROCEDURE — 3079F DIAST BP 80-89 MM HG: CPT | Mod: CPTII,S$GLB,,

## (undated) DEVICE — SPONGE COTTON TRAY 4X4IN

## (undated) DEVICE — INSERTS STEALTH FIBRA SIZE 5

## (undated) DEVICE — SUT 2/0 30IN ETHIBOND

## (undated) DEVICE — CATH THOR STND RGHT ANG 32FR

## (undated) DEVICE — CATH 4FR JL 3.5

## (undated) DEVICE — BANDAGE ELAS SOFTWRAP ST 4X5YD

## (undated) DEVICE — SUT VICRYL BR 1 GEN 27 CT-1

## (undated) DEVICE — DRAPE CVMAX SPLIT ANES SCRN

## (undated) DEVICE — SUT SILK 2-0 SH 18IN BLACK

## (undated) DEVICE — DRAPE SLUSH WARMER WITH DISC

## (undated) DEVICE — TRAY HEART OMC

## (undated) DEVICE — TIP YANKAUERS BULB NO VENT

## (undated) DEVICE — CATH THORACIC 32FR ST

## (undated) DEVICE — SUT BONE WAX 2.5 GRMS 12/BX

## (undated) DEVICE — KIT SAHARA DRAPE DRAW/LIFT

## (undated) DEVICE — BLOWER MISTER

## (undated) DEVICE — DRESSING ADH ISLAND 3.6 X 14

## (undated) DEVICE — CLIP SPRING 6MM

## (undated) DEVICE — SYS VIRTUOSAPH PLUS EVM

## (undated) DEVICE — SUT PROLENE 7-0 BV-1 30

## (undated) DEVICE — SPONGE LAP 18X18 PREWASHED

## (undated) DEVICE — SUT SILK BLK BR. 2 2-60

## (undated) DEVICE — DECANTER FLUID TRNSF WHITE 9IN

## (undated) DEVICE — BANDAGE ELAS SOFTWRAP ST 6X5YD

## (undated) DEVICE — PAD DEFIB CADENCE ADULT R2

## (undated) DEVICE — TRAY CATH 1-LYR URIMTR 16FR

## (undated) DEVICE — CONNECTOR IV ONE-LINK NEEDLE

## (undated) DEVICE — KIT URINARY CATH URINE METER

## (undated) DEVICE — FOGERTY SOFT JAW DISP 2/PK

## (undated) DEVICE — WIRE INTRAMYOCARDIAL TEMP

## (undated) DEVICE — SYR 3CC LUER LOC

## (undated) DEVICE — CANNULA IMA 1MM

## (undated) DEVICE — DRAPE ANGIO BRACH 38X44IN

## (undated) DEVICE — BLADE BEAVER 15 DEG 1.5MM

## (undated) DEVICE — BLADE 4 INCH EDGE UN-INS

## (undated) DEVICE — GOWN POLY REINF BRTH SLV XL

## (undated) DEVICE — CONNECTOR Y 3/8X3/8X3/8

## (undated) DEVICE — ELECTRODE MULTI-FUNC ADULTSTAT

## (undated) DEVICE — PAD CURAD NONADH 3X4IN

## (undated) DEVICE — PROBE CATH TEMP 16 FRFOLEY 400

## (undated) DEVICE — PAD K-THERMIA 24IN X 60IN

## (undated) DEVICE — KIT CUSTOM MANIFOLD

## (undated) DEVICE — BLADE SAW STERNAL 5/BX

## (undated) DEVICE — ELECTRODE MEGADYNE RETURN DUAL

## (undated) DEVICE — OMNIPAQUE 350 200ML

## (undated) DEVICE — PLEDGET SUT SOFT 3/8X3/16X1/16

## (undated) DEVICE — CLIPS VASCU-STAT YELLOW

## (undated) DEVICE — KIT GLIDESHEATH SLEND 6FR 10CM

## (undated) DEVICE — PENCIL ROCKER SWITCH 10FT CORD

## (undated) DEVICE — DRAIN CHEST DRY SUCTION

## (undated) DEVICE — PUNCH AORTIC 4.0MM 6/CASE

## (undated) DEVICE — SUT VICRYL 3-0 27 SH

## (undated) DEVICE — HEMOSTAT VASC BAND REG 24CM

## (undated) DEVICE — DRESSING TRANS 4X4 TEGADERM

## (undated) DEVICE — SUT 6 18IN STEEL MONO CCS

## (undated) DEVICE — TRAY CATH LAB OMC

## (undated) DEVICE — SPIKE SHORT LG BORE 1-WAY 2IN

## (undated) DEVICE — SUT PROLENE 5-0 BL C-1 4-24

## (undated) DEVICE — BLADE MICROSHARP BLUE 3MM 15DE

## (undated) DEVICE — COVER PROBE US 5.5X58L NON LTX

## (undated) DEVICE — SUT PROLENE 4-0 SH BLU 36IN

## (undated) DEVICE — GUIDEWIRE EMERALD .035IN 260CM

## (undated) DEVICE — RETRACTOR OCTOBASE INSERT HOLD

## (undated) DEVICE — DRESSING AQUACEL SACRAL 9 X 9

## (undated) DEVICE — DRAPE HALF SURGICAL 40X58IN

## (undated) DEVICE — GLOVE BIOGEL PI MICRO SZ 7.5

## (undated) DEVICE — CANNULA VESSEL FREE FLOW

## (undated) DEVICE — SUT 4-0 12-18IN SILK BLACK

## (undated) DEVICE — TUBING HF INSUFFLATION W/ FLTR

## (undated) DEVICE — CATH INFINITI JUDKINS JR4

## (undated) DEVICE — SUT PROLENE 4-0 RB-1 BL MO